# Patient Record
Sex: FEMALE | Race: BLACK OR AFRICAN AMERICAN | Employment: UNEMPLOYED | URBAN - METROPOLITAN AREA
[De-identification: names, ages, dates, MRNs, and addresses within clinical notes are randomized per-mention and may not be internally consistent; named-entity substitution may affect disease eponyms.]

---

## 2017-01-17 ENCOUNTER — OFFICE VISIT (OUTPATIENT)
Dept: INTERNAL MEDICINE CLINIC | Age: 62
End: 2017-01-17

## 2017-01-17 VITALS
SYSTOLIC BLOOD PRESSURE: 96 MMHG | DIASTOLIC BLOOD PRESSURE: 70 MMHG | WEIGHT: 182 LBS | HEART RATE: 100 BPM | RESPIRATION RATE: 16 BRPM | TEMPERATURE: 99.2 F | BODY MASS INDEX: 34.36 KG/M2 | HEIGHT: 61 IN | OXYGEN SATURATION: 94 %

## 2017-01-17 DIAGNOSIS — M12.811 ROTATOR CUFF ARTHROPATHY, RIGHT: Primary | ICD-10-CM

## 2017-01-17 DIAGNOSIS — E11.9 CONTROLLED TYPE 2 DIABETES MELLITUS WITHOUT COMPLICATION, WITHOUT LONG-TERM CURRENT USE OF INSULIN (HCC): ICD-10-CM

## 2017-01-17 LAB — GLUCOSE POC: 248 MG/DL

## 2017-01-17 RX ORDER — TRAMADOL HYDROCHLORIDE 50 MG/1
50 TABLET ORAL
Qty: 14 TAB | Refills: 0 | Status: SHIPPED | OUTPATIENT
Start: 2017-01-17 | End: 2017-02-15 | Stop reason: SDUPTHER

## 2017-01-17 RX ORDER — TRIAMCINOLONE ACETONIDE 40 MG/ML
40 INJECTION, SUSPENSION INTRA-ARTICULAR; INTRAMUSCULAR ONCE
Qty: 1 VIAL | Refills: 0
Start: 2017-01-17 | End: 2017-01-17

## 2017-01-17 RX ORDER — PREGABALIN 150 MG/1
CAPSULE ORAL
Qty: 60 CAP | Refills: 3 | Status: SHIPPED | OUTPATIENT
Start: 2017-01-17 | End: 2017-09-06 | Stop reason: SDUPTHER

## 2017-01-17 NOTE — MR AVS SNAPSHOT
Visit Information Date & Time Provider Department Dept. Phone Encounter #  
 1/17/2017 12:15 PM Madison Vazquez MD [facility] [phone] [id number] Follow-up Instructions Return in about 4 weeks (around 2/14/2017), or if symptoms worsen or fail to improve. Upcoming Health Maintenance Date Due  
 EYE EXAM RETINAL OR DILATED Q1 8/25/1965 Pneumococcal 19-64 Medium Risk (1 of 1 - PPSV23) 8/25/1974 FOOT EXAM Q1 9/16/2016 FOBT Q 1 YEAR AGE 50-75 1/13/2017 MICROALBUMIN Q1 2/10/2017 DTaP/Tdap/Td series (1 - Tdap) 11/9/2017* HEMOGLOBIN A1C Q6M 6/12/2017 LIPID PANEL Q1 12/12/2017 PAP AKA CERVICAL CYTOLOGY 1/7/2018 BREAST CANCER SCRN MAMMOGRAM 5/26/2018 *Topic was postponed. The date shown is not the original due date. Allergies as of 1/17/2017  Review Complete On: 1/17/2017 By: Madison Vazquez MD  
 No Known Allergies Current Immunizations  Reviewed on 10/10/2011 Name Date Influenza Vaccine Split 10/10/2011 Not reviewed this visit You Were Diagnosed With   
  
 Codes Comments Rotator cuff arthropathy, right    -  Primary ICD-10-CM: X36.477 ICD-9-CM: 716.81 Controlled type 2 diabetes mellitus without complication, without long-term current use of insulin ([facility]     ICD-10-CM: E11.9 ICD-9-CM: 250.00 Vitals BP Pulse Temp Resp Height(growth percentile) Weight(growth percentile) 96/70 100 99.2 °F (37.3 °C) (Oral) 16 5' 1\" (1.549 m) 182 lb (82.6 kg) SpO2 BMI OB Status Smoking Status 94% 34.39 kg/m2 Postmenopausal Never Smoker Vitals History BMI and BSA Data Body Mass Index Body Surface Area  
 34.39 kg/m 2 1.89 m 2 Preferred Pharmacy Pharmacy Name Phone [facility] [address], [address] [phone] Your Updated Medication List  
  
   
This list is accurate as of: 1/17/17  1:25 PM.  Always use your most recent med list.  
  
  
  
  
 aspirin 81 mg tablet Take  by mouth. cetirizine 10 mg tablet Commonly known as:  ZYRTEC Take 1 Tab by mouth daily. diphenoxylate-atropine 2.5-0.025 mg per tablet Commonly known as:  LOMOTIL Take 1 tablet by mouth four (4) times daily as needed for Diarrhea.  
  
 esomeprazole 40 mg capsule Commonly known as:  NexIUM  
TAKE ONE (1) CAPSULE TWICE DAILY. fenofibrate nanocrystallized 145 mg tablet Commonly known as:  [employer] Take 1 Tab by mouth daily. 134 mg daily  
  
 fluticasone 50 mcg/actuation nasal spray Commonly known as:  [place] 2 Sprays by Both Nostrils route daily. ipratropium 0.06 % nasal spray Commonly known as:  ATROVENT  
2 Sprays by Both Nostrils route four (4) times daily. irbesartan 150 mg tablet Commonly known as:  AVAPRO TAKE 1 TABLET EVERY NIGHT. LORazepam 1 mg tablet Commonly known as:  ATIVAN Take 1 Tab by mouth every four (4) hours as needed for Anxiety. Max Daily Amount: 6 mg.  
  
 metFORMIN 500 mg tablet Commonly known as:  GLUCOPHAGE Take 1 Tab by mouth two (2) times daily (with meals). mometasone 50 mcg/actuation nasal spray Commonly known as:  NASONEX  
2 Sprays by Both Nostrils route daily. pregabalin 150 mg capsule Commonly known as:  Suzannebessy Whitetead TAKE ONE (1) CAPSULE TWICE DAILY. SITagliptin 100 mg tablet Commonly known as:  [place] Take 1 Tab by mouth daily. traMADol 50 mg tablet Commonly known as:  ULTRAM  
Take 1 Tab by mouth every six (6) hours as needed for Pain. Max Daily Amount: 200 mg.  
  
 triamcinolone acetonide 40 mg/mL injection Commonly known as:  KENALOG-40  
1 mL by IntraBURSal route once for 1 dose. Prescriptions Printed Refills  
 traMADol (ULTRAM) 50 mg tablet 0 Sig: Take 1 Tab by mouth every six (6) hours as needed for Pain. Max Daily Amount: 200 mg. Class: Print  Route: Oral  
 pregabalin (LYRICA) 150 mg capsule 3 Sig: TAKE ONE (1) CAPSULE TWICE DAILY. Class: Print We Performed the Following AMB POC GLUCOSE BLOOD, BY GLUCOSE MONITORING DEVICE [25724 CPT(R)] [place] DRAIN/INJECT INTERMEDIATE JOINT/BURSA [id number] CPT(R)] Follow-up Instructions Return in about 4 weeks (around 2017), or if symptoms worsen or fail to improve. Patient Instructions [employer] Activation Thank you for requesting access to [employer]. Please follow the instructions below to securely access and download your online medical record. [employer] allows you to send messages to your doctor, view your test results, renew your prescriptions, schedule appointments, and more. How Do I Sign Up? 1. In your internet browser, go to [email or web address] 
2. Click on the First Time User? Click Here link in the Sign In box. You will be redirect to the New Member Sign Up page. 3. Enter your [employer] Access Code exactly as it appears below. You will not need to use this code after youve completed the sign-up process. If you do not sign up before the expiration date, you must request a new code. [employer] Access Code: Activation code not generated Current [employer] Status: Active (This is the date your [employer] access code will ) 4. Enter the last four digits of your Social Security Number (xxxx) and Date of Birth (mm/dd/yyyy) as indicated and click Submit. You will be taken to the next sign-up page. 5. Create a [employer]ouses ID. This will be your [employer] login ID and cannot be changed, so think of one that is secure and easy to remember. 6. Create a [employer] password. You can change your password at any time. 7. Enter your Password Reset Question and Answer. This can be used at a later time if you forget your password. 8. Enter your e-mail address. You will receive e-mail notification when new information is available in [address] 9. Click Sign Up. You can now view and download portions of your medical record. 10. Click the Download Summary menu link to download a portable copy of your medical information. Additional Information If you have questions, please visit the Frequently Asked Questions section of the Regal[employer] website at [email or web address]/Global [email or web address] Remember, [employer] is NOT to be used for urgent needs. For medical emergencies, dial 911. Introducing [facility]! Dear Jose Taylor: Thank you for requesting a RegalBox account. Our records indicate that you already have an active RegalBox account. You can access your account anytime at [email or web address]/Global Grind Did you know that you can access your hospital and ER discharge instructions at any time in RegalBox? You can also review all of your test results from your hospital stay or ER visit. Additional Information If you have questions, please visit the Frequently Asked Questions section of the [employer] website at [email or web address] Remember, [employer] is NOT to be used for urgent needs. For medical emergencies, dial 911. Now available from your iPhone and Android! Please provide this summary of care documentation to your next provider. Your primary care clinician is listed as Catarina He. If you have any questions after today's visit, please call [phone].

## 2017-01-17 NOTE — PATIENT INSTRUCTIONS
iTrac[employer]ar[employer]ullo Activation    Thank you for requesting access to [employer]. Please follow the instructions below to securely access and download your online medical record. [employer] allows you to send messages to your doctor, view your test results, renew your prescriptions, schedule appointments, and more. How Do I Sign Up? 1. In your internet browser, go to [email or web address]  2. Click on the First Time User? Click Here link in the Sign In box. You will be redirect to the New Member Sign Up page. 3. Enter your Ref[employer] Access Code exactly as it appears below. You will not need to use this code after youve completed the sign-up process. If you do not sign up before the expiration date, you must request a new code. [employer] Access Code: Activation code not generated  Current [employer] Status: Active (This is the date your Ref[employer] access code will )    4. Enter the last four digits of your Social Security Number (xxxx) and Date of Birth (mm/dd/yyyy) as indicated and click Submit. You will be taken to the next sign-up page. 5. Create a Ref[employer]ics ID. This will be your [employer]ics login ID and cannot be changed, so think of one that is secure and easy to remember. 6. Create a [employer] password. You can change your password at any time. 7. Enter your Password Reset Question and Answer. This can be used at a later time if you forget your password. 8. Enter your e-mail address. You will receive e-mail notification when new information is available in [address] 9. Click Sign Up. You can now view and download portions of your medical record. 10. Click the Download Summary menu link to download a portable copy of your medical information. Additional Information    If you have questions, please visit the Frequently Asked Questions section of the [employer] website at [email or web address] Remember, [employer] is NOT to be used for urgent needs. For medical emergencies, dial 911.

## 2017-01-17 NOTE — PROGRESS NOTES
Maegan Shane is a 64 y.o. female and presents with Back Pain; Diabetes; Other (disability); and Shoulder Pain  . Subjective:    Dyslipidemia Review:  Patient presents for evaluation of lipids. Compliance with treatment thus far has been excellent. A repeat fasting lipid profile was done. The patient does not use medications that may worsen dyslipidemias (corticosteroids, progestins, anabolic steroids, diuretics, beta-blockers, amiodarone, cyclosporine, olanzapine). The patient exercises daily. The patient is not known to have coexisting coronary artery disease. Diabetes Mellitus Review:  She has diabetes mellitus. Diabetic ROS - medication compliance: compliant all of the time, diabetic diet compliance: compliant all of the time, home glucose monitoring: is performed. Known diabetic complications: none  Cardiovascular risk factors: family history, dyslipidemia, diabetes mellitus, obesity, hypertension  Current diabetic medications include oral agents   Eye exam current (within one year): no  Weight trend: stable  Prior visit with dietician: no  Current diet: \"healthy\" diet  in general  Current exercise: walking  Current monitoring regimen: home blood tests - daily  Home blood sugar records: trend: stable  Any episodes of hypoglycemia? no  Is She on ACE inhibitor or angiotensin II receptor blocker? Yes     Back Pain Review:  Patient presents for evaluation of low back problems. Symptoms have been present for months and include pain in lower back (dull, mild in character;4 /10 in severity). Initial inciting event: none. Symptoms are worst: at times. Alleviating factors identifiable by patient are lying flat, medication . Exacerbating factors identifiable by patient are bending forwards, bending backwards. Treatments so far initiated by patient: medication Previous lower back problems: reported. Previous workup: none.      Shoulder Pain Review:  Patient complains of right side shoulder pains have retured. The symptoms began months ago Course of symptoms since onset has been gradually worsening. Pain is described as overall severity = moderate. Symptoms were incited by no known event. Patient denies N/A. Therapy to date includes OTC analgesics:steroid injections effective. Review of Systems  Constitutional: negative for fevers, chills, anorexia and weight loss  Eyes:   negative for visual disturbance and irritation  ENT:   negative for tinnitus,sore throat,nasal congestion,ear pains. hoarseness  Respiratory:  negative for cough, hemoptysis, dyspnea,wheezing  CV:   negative for chest pain, palpitations, lower extremity edema  GI:   nausea,  abdominal fullness  Endo:               negative for polyuria,polydipsia,polyphagia,heat intolerance  Genitourinary: negative for frequency, dysuria and hematuria  Integument:  negative for rash and pruritus  Hematologic:  negative for easy bruising and gum/nose bleeding  Musculoskel: negative for myalgias, arthralgias, back pain, muscle weakness, joint pain  Neurological:  negative for headaches, dizziness, vertigo, memory problems and gait   Behavl/Psych: negative for feelings of anxiety, depression, mood changes    Past Medical History   Diagnosis Date    Acute cholecystitis 2/9/2011    Acute cystitis 2/9/2011    Cholecystitis 2/9/2011    Cholelithiasis 2/9/2011    Chronic Pain 2/9/2011    Essential hypertension, benign 2/9/2011    GERD (gastroesophageal reflux disease)     GERD, Gastro- Esophageal Reflux Diease (acid reflux) 2/9/2011    Herniated nucleus pulposus 2/9/2011    Hypercholesteremia 2/9/2011    Hypercholesteremia 2/9/2011    Hypertension     Neurogenic bladder, NOS 2/9/2011     Past Surgical History   Procedure Laterality Date    Hx gyn      Hx hammer toe repair  6/21/2016     left foot      Social History     Social History    Marital status:      Spouse name: N/A    Number of children: N/A    Years of education: N/A     Social History Main Topics    Smoking status: Never Smoker    Smokeless tobacco: Never Used    Alcohol use No    Drug use: No    Sexual activity: Yes     Partners: Male     Other Topics Concern    None     Social History Narrative     Family History   Problem Relation Age of Onset    Heart Disease Mother     Cancer Father      Current Outpatient Prescriptions   Medication Sig Dispense Refill    triamcinolone acetonide (KENALOG-40) 40 mg/mL injection 1 mL by IntraBURSal route once for 1 dose. 1 Vial 0    traMADol (ULTRAM) 50 mg tablet Take 1 Tab by mouth every six (6) hours as needed for Pain. Max Daily Amount: 200 mg. 14 Tab 0    pregabalin (LYRICA) 150 mg capsule TAKE ONE (1) CAPSULE TWICE DAILY. 60 Cap 3    sitaGLIPtin (JANUVIA) 100 mg tablet Take 1 Tab by mouth daily. 30 Tab 12    fluticasone (FLONASE) 50 mcg/actuation nasal spray 2 Sprays by Both Nostrils route daily. 1 Bottle 12    ipratropium (ATROVENT) 0.06 % nasal spray 2 Sprays by Both Nostrils route four (4) times daily. 15 mL 0    cetirizine (ZYRTEC) 10 mg tablet Take 1 Tab by mouth daily. 30 Tab 0    esomeprazole (NEXIUM) 40 mg capsule TAKE ONE (1) CAPSULE TWICE DAILY. 60 Cap 12    metFORMIN (GLUCOPHAGE) 500 mg tablet Take 1 Tab by mouth two (2) times daily (with meals). 60 Tab 12    fenofibrate nanocrystallized (TRICOR) 145 mg tablet Take 1 Tab by mouth daily. 134 mg daily 30 Tab 12    irbesartan (AVAPRO) 150 mg tablet TAKE 1 TABLET EVERY NIGHT. 30 Tab 12    LORazepam (ATIVAN) 1 mg tablet Take 1 Tab by mouth every four (4) hours as needed for Anxiety. Max Daily Amount: 6 mg. 4 Tab 0    diphenoxylate-atropine (LOMOTIL) 2.5-0.025 mg per tablet Take 1 tablet by mouth four (4) times daily as needed for Diarrhea. 60 tablet 0    mometasone (NASONEX) 50 mcg/actuation nasal spray 2 Sprays by Both Nostrils route daily. 1 Container 12    aspirin 81 mg tablet Take  by mouth.        No Known Allergies    Objective:  Visit Vitals    BP 96/70    Pulse 100    Temp 99.2 °F (37.3 °C) (Oral)    Resp 16    Ht 5' 1\" (1.549 m)    Wt 182 lb (82.6 kg)    SpO2 94%    BMI 34.39 kg/m2     Physical Exam:   General appearance - alert, well appearing, and in moderate distress  Mental status - alert, oriented to person, place, and time  EYE-LAURI, EOMI, corneas normal, no foreign bodies  ENT-ENT exam normal, no neck nodes or sinus tenderness  Nose - normal and patent, no erythema, discharge or polyps  Mouth - mucous membranes moist, pharynx normal without lesions  Neck - supple, no significant adenopathy   Chest - clear to auscultation, no wheezes, rales or rhonchi, symmetric air entry   Heart - normal rate, regular rhythm, normal S1, S2, no murmurs, rubs, clicks or gallops   Abdomen - soft, nontender, nondistended, no masses or organomegaly  Lymph- no adenopathy palpable  Ext-peripheral pulses normal, no pedal edema, no clubbing or cyanosis  Skin-Warm and dry. no hyperpigmentation, vitiligo, or suspicious lesions  Neuro -alert, oriented, normal speech, no focal findings or movement disorder noted  Neck-normal C-spine, no tenderness, full ROM without pain  Rt.shoulder-reduced range of motion of rt., subdeltoid tenderness    Results for orders placed or performed in visit on 17   AMB POC GLUCOSE BLOOD, BY GLUCOSE MONITORING DEVICE   Result Value Ref Range    Glucose  mg/dL       Assessment/Plan:    ICD-10-CM ICD-9-CM    1. Rotator cuff arthropathy, right M12.811 716.81 ID DRAIN/INJECT INTERMEDIATE JOINT/BURSA   2. Controlled type 2 diabetes mellitus without complication, without long-term current use of insulin ([facility]) E11.9 250.00 AMB POC GLUCOSE BLOOD, BY GLUCOSE MONITORING DEVICE     Orders Placed This Encounter    AMB POC GLUCOSE BLOOD, BY GLUCOSE MONITORING DEVICE    ID DRAIN/INJECT INTERMEDIATE JOINT/BURSA    triamcinolone acetonide (KENALOG-40) 40 mg/mL injection     Si mL by IntraBURSal route once for 1 dose.      Dispense:  1 Vial     Refill: 0    traMADol (ULTRAM) 50 mg tablet     Sig: Take 1 Tab by mouth every six (6) hours as needed for Pain. Max Daily Amount: 200 mg. Dispense:  14 Tab     Refill:  0    pregabalin (LYRICA) 150 mg capsule     Sig: TAKE ONE (1) CAPSULE TWICE DAILY. Dispense:  60 Cap     Refill:  3     lose weight, follow low fat diet, follow low salt diet,Take 81mg aspirin daily  Indications:   Symptomatic relief of pain    Procedure:  After consent was obtained, using sterile technique the right shoulder joint was prepped using alcohol. Local anesthetic used: 1% lidocaine. . The joint was entered and Kenalog 40 mg was mixed with 1% lidocaine 3 ml  and injected into the joint and the needle withdrawn. The procedure was well tolerated. The patient is asked to continue to rest the joint for a few more days before resuming regular activities. It may be more painful for the first 1-2 days. Watch for fever, or increased swelling or persistent pain in the joint. Call or return to clinic prn if such symptoms occur or there is failure to improve as anticipated.       [facility]  OFFICE PROCEDURE PROGRESS NOTE        Chart reviewed for the following:   Alex Layton MD, have reviewed the History, Physical and updated the Allergic reactions for [address] [place] [address] performed immediately prior to start of procedure:   Alex Layton MD, have performed the following reviews on [employer] [place] prior to the start of the procedure:            * Patient was identified by name and date of birth   * Agreement on procedure being performed was verified  * Risks and Benefits explained to the patient  * Procedure site verified and marked as necessary  * Patient was positioned for comfort       Time:1:20pm      Date of procedure: 1/17/2017    Procedure performed by:  Monisha Sibley MD    Patient assisted by: self    How tolerated by patient: tolerated the procedure well with no complications    Comments: nonev                Patient Instructions   MyChart Activation    Thank you for requesting access to Etable. Please follow the instructions below to securely access and download your online medical record. Etable allows you to send messages to your doctor, view your test results, renew your prescriptions, schedule appointments, and more. How Do I Sign Up? 1. In your internet browser, go to [email or web address]  2. Click on the First Time User? Click Here link in the Sign In box. You will be redirect to the New Member Sign Up page. 3. Enter your Etable Access Code exactly as it appears below. You will not need to use this code after youve completed the sign-up process. If you do not sign up before the expiration date, you must request a new code. Etable Access Code: Activation code not generated  Current Etable Status: Active (This is the date your Etable access code will )    4. Enter the last four digits of your Social Security Number (xxxx) and Date of Birth (mm/dd/yyyy) as indicated and click Submit. You will be taken to the next sign-up page. 5. Create a Etable ID. This will be your Etable login ID and cannot be changed, so think of one that is secure and easy to remember. 6. Create a Etable password. You can change your password at any time. 7. Enter your Password Reset Question and Answer. This can be used at a later time if you forget your password. 8. Enter your e-mail address. You will receive e-mail notification when new information is available in [address] 9. Click Sign Up. You can now view and download portions of your medical record. 10. Click the Download Summary menu link to download a portable copy of your medical information. Additional Information    If you have questions, please visit the Frequently Asked Questions section of the Etable website at [email or web address] Remember, Etable is NOT to be used for urgent needs. For medical emergencies, dial 911. Follow-up Disposition:  Return in about 4 weeks (around 2/14/2017), or if symptoms worsen or fail to improve. I have reviewed with the patient details of the assessment and plan and all questions were answered. Relevent patient education was performed    An After Visit Summary was printed and given to the patient.

## 2017-02-15 ENCOUNTER — OFFICE VISIT (OUTPATIENT)
Dept: INTERNAL MEDICINE CLINIC | Age: 62
End: 2017-02-15

## 2017-02-15 VITALS
RESPIRATION RATE: 18 BRPM | TEMPERATURE: 98 F | OXYGEN SATURATION: 98 % | SYSTOLIC BLOOD PRESSURE: 100 MMHG | HEART RATE: 78 BPM | WEIGHT: 180 LBS | DIASTOLIC BLOOD PRESSURE: 60 MMHG | HEIGHT: 61 IN | BODY MASS INDEX: 33.99 KG/M2

## 2017-02-15 DIAGNOSIS — E78.00 HYPERCHOLESTEREMIA: ICD-10-CM

## 2017-02-15 DIAGNOSIS — M12.811 ROTATOR CUFF ARTHROPATHY, RIGHT: ICD-10-CM

## 2017-02-15 DIAGNOSIS — I10 ESSENTIAL HYPERTENSION, BENIGN: ICD-10-CM

## 2017-02-15 DIAGNOSIS — M51.26 HERNIATION OF INTERVERTEBRAL DISC BETWEEN L4 AND L5: ICD-10-CM

## 2017-02-15 DIAGNOSIS — M48.061 LUMBAR SPINAL STENOSIS: ICD-10-CM

## 2017-02-15 DIAGNOSIS — E11.9 CONTROLLED TYPE 2 DIABETES MELLITUS WITHOUT COMPLICATION, WITHOUT LONG-TERM CURRENT USE OF INSULIN (HCC): Primary | ICD-10-CM

## 2017-02-15 LAB — GLUCOSE POC: 144 MG/DL

## 2017-02-15 RX ORDER — TRAMADOL HYDROCHLORIDE 50 MG/1
50 TABLET ORAL
Qty: 14 TAB | Refills: 0 | Status: SHIPPED | OUTPATIENT
Start: 2017-02-15 | End: 2017-03-15 | Stop reason: ALTCHOICE

## 2017-02-15 NOTE — PROGRESS NOTES
Tremaine Moss is a 64 y.o. female and presents with Dizziness and Diabetes  . Subjective:  She still has some vertigo noted  She has dizziness on standing    Diabetes Mellitus Review:  She has diabetes mellitus. Diabetic ROS - medication compliance: compliant all of the time, diabetic diet compliance: compliant all of the time, home glucose monitoring: is performed. Known diabetic complications: none  Cardiovascular risk factors: family history, dyslipidemia, diabetes mellitus, obesity, hypertension  Current diabetic medications include oral agents   Eye exam current (within one year): no  Weight trend: stable  Prior visit with dietician: no  Current diet: \"healthy\" diet  in general  Current exercise: walking  Current monitoring regimen: home blood tests - daily  Home blood sugar records: trend: stable  Any episodes of hypoglycemia? no  Is She on ACE inhibitor or angiotensin II receptor blocker? Yes     Back Pain Review:  Patient presents for evaluation of low back problems. Symptoms have been present for years and include pain in lower back (dull, mild in character; 4/10 in severity). Initial inciting event: unknowne. Symptoms are worst: at times. Alleviating factors identifiable by patient are lying flat, medication . Exacerbating factors identifiable by patient are bending forwards, bending backwards. Treatments so far initiated by patient: medication Previous lower back problems: reported. Previous workup: noted. Review of Systems  Constitutional: negative for fevers, chills, anorexia and weight loss  Eyes:   negative for visual disturbance and irritation  ENT:   negative for tinnitus,sore throat,nasal congestion,ear pains. hoarseness  Respiratory:  negative for cough, hemoptysis, dyspnea,wheezing  CV:   negative for chest pain, palpitations, lower extremity edema  GI:   negative for nausea, vomiting, diarrhea, abdominal pain,melena  Endo:               negative for polyuria,polydipsia,polyphagia,heat intolerance  Genitourinary: negative for frequency, dysuria and hematuria  Integument:  negative for rash and pruritus  Hematologic:  negative for easy bruising and gum/nose bleeding  Musculoskel: back pain, muscle weakness, joint pain  Neurological:  negative for headaches, dizziness, vertigo, memory problems and gait   Behavl/Psych: negative for feelings of anxiety, depression, mood changes    Past Medical History   Diagnosis Date    Acute cholecystitis 2/9/2011    Acute cystitis 2/9/2011    Cholecystitis 2/9/2011    Cholelithiasis 2/9/2011    Chronic Pain 2/9/2011    Essential hypertension, benign 2/9/2011    GERD (gastroesophageal reflux disease)     GERD, Gastro- Esophageal Reflux Diease (acid reflux) 2/9/2011    Herniated nucleus pulposus 2/9/2011    Hypercholesteremia 2/9/2011    Hypercholesteremia 2/9/2011    Hypertension     Neurogenic bladder, NOS 2/9/2011     Past Surgical History   Procedure Laterality Date    Hx gyn      Hx hammer toe repair  6/21/2016     left foot      Social History     Social History    Marital status:      Spouse name: N/A    Number of children: N/A    Years of education: N/A     Social History Main Topics    Smoking status: Never Smoker    Smokeless tobacco: Never Used    Alcohol use No    Drug use: No    Sexual activity: Yes     Partners: Male     Other Topics Concern    None     Social History Narrative     Family History   Problem Relation Age of Onset    Heart Disease Mother     Cancer Father      Current Outpatient Prescriptions   Medication Sig Dispense Refill    traMADol (ULTRAM) 50 mg tablet Take 1 Tab by mouth every six (6) hours as needed for Pain. Max Daily Amount: 200 mg. 14 Tab 0    pregabalin (LYRICA) 150 mg capsule TAKE ONE (1) CAPSULE TWICE DAILY. 60 Cap 3    sitaGLIPtin (JANUVIA) 100 mg tablet Take 1 Tab by mouth daily.  30 Tab 12    fluticasone (FLONASE) 50 mcg/actuation nasal spray 2 Sprays by Both Nostrils route daily. 1 Bottle 12    ipratropium (ATROVENT) 0.06 % nasal spray 2 Sprays by Both Nostrils route four (4) times daily. 15 mL 0    cetirizine (ZYRTEC) 10 mg tablet Take 1 Tab by mouth daily. 30 Tab 0    metFORMIN (GLUCOPHAGE) 500 mg tablet Take 1 Tab by mouth two (2) times daily (with meals). 60 Tab 12    fenofibrate nanocrystallized (TRICOR) 145 mg tablet Take 1 Tab by mouth daily. 134 mg daily 30 Tab 12    irbesartan (AVAPRO) 150 mg tablet TAKE 1 TABLET EVERY NIGHT. 30 Tab 12    aspirin 81 mg tablet Take  by mouth.  esomeprazole (NEXIUM) 40 mg capsule TAKE ONE (1) CAPSULE TWICE DAILY. 60 Cap 12    LORazepam (ATIVAN) 1 mg tablet Take 1 Tab by mouth every four (4) hours as needed for Anxiety. Max Daily Amount: 6 mg. 4 Tab 0    diphenoxylate-atropine (LOMOTIL) 2.5-0.025 mg per tablet Take 1 tablet by mouth four (4) times daily as needed for Diarrhea. 60 tablet 0    mometasone (NASONEX) 50 mcg/actuation nasal spray 2 Sprays by Both Nostrils route daily.  1 Container 12     No Known Allergies    Objective:  Visit Vitals    /60    Pulse 78    Temp 98 °F (36.7 °C) (Oral)    Resp 18    Ht 5' 1\" (1.549 m)    Wt 180 lb (81.6 kg)    SpO2 98%    BMI 34.01 kg/m2     Physical Exam:   General appearance - alert, well appearing, and in moderate distress  Mental status - alert, oriented to person, place, and time  EYE-LAURI, EOMI, corneas normal, no foreign bodies  ENT-ENT exam normal, no neck nodes or sinus tenderness  Nose - normal and patent, no erythema, discharge or polyps  Mouth - mucous membranes moist, pharynx normal without lesions  Neck - supple, no significant adenopathy   Chest - clear to auscultation, no wheezes, rales or rhonchi, symmetric air entry   Heart - normal rate, regular rhythm, normal S1, S2, no murmurs, rubs, clicks or gallops   Abdomen - soft, nontender, nondistended, no masses or organomegaly  Lymph- no adenopathy palpable  Ext-peripheral pulses normal, no pedal edema, no clubbing or cyanosis  Skin-Warm and dry. no hyperpigmentation, vitiligo, or suspicious lesions  Neuro -alert, oriented, normal speech, no focal findings or movement disorder noted  Neck-normal C-spine, no tenderness, full ROM without pain  Feet-no nail deformities or callus formation with good pulses noted  Back-.limited range of motion, pain with motion noted during exam, tenderness noted ls spine    Results for orders placed or performed in visit on 02/15/17   AMB POC GLUCOSE BLOOD, BY GLUCOSE MONITORING DEVICE   Result Value Ref Range    Glucose  mg/dL       Assessment/Plan:    ICD-10-CM ICD-9-CM    1. Controlled type 2 diabetes mellitus without complication, without long-term current use of insulin ([facility]) E11.9 250.00 AMB POC GLUCOSE BLOOD, BY GLUCOSE MONITORING DEVICE     Orders Placed This Encounter    AMB POC GLUCOSE BLOOD, BY GLUCOSE MONITORING DEVICE     lose weight, increase physical activity, follow low fat diet, follow low salt diet,Take 81mg aspirin daily  There are no Patient Instructions on file for this visit. Follow-up Disposition: Not on File      I have reviewed with the patient details of the assessment and plan and all questions were answered. Relevent patient education was performed    An After Visit Summary was printed and given to the patient.

## 2017-02-15 NOTE — PATIENT INSTRUCTIONS
[employer] Activation    Thank you for requesting access to [employer]. Please follow the instructions below to securely access and download your online medical record. [facility] allows you to send messages to your doctor, view your test results, renew your prescriptions, schedule appointments, and more. How Do I Sign Up? 1. In your internet browser, go to [email or web address]  2. Click on the First Time User? Click Here link in the Sign In box. You will be redirect to the New Member Sign Up page. 3. Enter your [facility] Access Code exactly as it appears below. You will not need to use this code after youve completed the sign-up process. If you do not sign up before the expiration date, you must request a new code. [facility] Access Code: Activation code not generated  Current [facility] Status: Active (This is the date your [facility]F[employer] access code will )    4. Enter the last four digits of your Social Security Number (xxxx) and Date of Birth (mm/dd/yyyy) as indicated and click Submit. You will be taken to the next sign-up page. 5. Create a ProF[employer] ID. This will be your [employer] login ID and cannot be changed, so think of one that is secure and easy to remember. 6. Create a [employer] password. You can change your password at any time. 7. Enter your Password Reset Question and Answer. This can be used at a later time if you forget your password. 8. Enter your e-mail address. You will receive e-mail notification when new information is available in [address] 9. Click Sign Up. You can now view and download portions of your medical record. 10. Click the Download Summary menu link to download a portable copy of your medical information. Additional Information    If you have questions, please visit the Frequently Asked Questions section of the [employer] website at [email or web address] Remember, [employer] is NOT to be used for urgent needs. For medical emergencies, dial 911.

## 2017-02-15 NOTE — MR AVS SNAPSHOT
Visit Information Date & Time Provider Department Dept. Phone Encounter #  
 2/15/2017  9:30 AM José Miguel Stinson MD [address] [place] [address] [phone] [id number] Follow-up Instructions Return in about 4 weeks (around 3/15/2017), or if symptoms worsen or fail to improve. Follow-up and Disposition History Upcoming Health Maintenance Date Due  
 EYE EXAM RETINAL OR DILATED Q1 8/25/1965 Pneumococcal 19-64 Medium Risk (1 of 1 - PPSV23) 8/25/1974 FOOT EXAM Q1 9/16/2016 FOBT Q 1 YEAR AGE 50-75 1/13/2017 MICROALBUMIN Q1 2/10/2017 DTaP/Tdap/Td series (1 - Tdap) 11/9/2017* HEMOGLOBIN A1C Q6M 6/12/2017 LIPID PANEL Q1 12/12/2017 PAP AKA CERVICAL CYTOLOGY 1/7/2018 BREAST CANCER SCRN MAMMOGRAM 5/26/2018 *Topic was postponed. The date shown is not the original due date. Allergies as of 2/15/2017  Review Complete On: 2/15/2017 By: José Miguel Stinson MD  
 No Known Allergies Current Immunizations  Reviewed on 10/10/2011 Name Date Influenza Vaccine Split 10/10/2011 Not reviewed this visit You Were Diagnosed With   
  
 Codes Comments Controlled type 2 diabetes mellitus without complication, without long-term current use of insulin [facility]    -  Primary ICD-10-CM: E11.9 ICD-9-CM: 250.00 Rotator cuff arthropathy, right     ICD-10-CM: B92.157 ICD-9-CM: 716.81 Hypercholesteremia     ICD-10-CM: E78.00 ICD-9-CM: 272.0 Essential hypertension, benign     ICD-10-CM: I10 
ICD-9-CM: 401.1 Lumbar spinal stenosis     ICD-10-CM: M48.06 
ICD-9-CM: 724.02 Herniation of intervertebral disc between L4 and L5     ICD-10-CM: M51.26 
ICD-9-CM: 722.10 Vitals BP Pulse Temp Resp Height(growth percentile) Weight(growth percentile) 100/60 78 98 °F (36.7 °C) (Oral) 18 5' 1\" (1.549 m) 180 lb (81.6 kg) SpO2 BMI OB Status Smoking Status 98% 34.01 kg/m2 Postmenopausal Never Smoker Vitals History BMI and BSA Data Body Mass Index Body Surface Area 34.01 kg/m 2 1.87 m 2 [facility] [address], [address] [place] [address] Road [phone] Your Updated Medication List  
  
   
This list is accurate as of: 2/15/17 10:19 AM.  Always use your most recent med list.  
  
  
  
  
 aspirin 81 mg tablet Take  by mouth. cetirizine 10 mg tablet Commonly known as:  ZYRTEC Take 1 Tab by mouth daily. diphenoxylate-atropine 2.5-0.025 mg per tablet Commonly known as:  LOMOTIL Take 1 tablet by mouth four (4) times daily as needed for Diarrhea.  
  
 esomeprazole 40 mg capsule Commonly known as:  NexIUM  
TAKE ONE (1) CAPSULE TWICE DAILY. fenofibrate nanocrystallized 145 mg tablet Commonly known as:  [employer] Take 1 Tab by mouth daily. 134 mg daily  
  
 fluticasone 50 mcg/actuation nasal spray Commonly known as:  Ivana Arellano 2 Sprays by Both Nostrils route daily. ipratropium 0.06 % nasal spray Commonly known as:  ATROVENT  
2 Sprays by Both Nostrils route four (4) times daily. irbesartan 150 mg tablet Commonly known as:  AVAPRO TAKE 1 TABLET EVERY NIGHT. LORazepam 1 mg tablet Commonly known as:  ATIVAN Take 1 Tab by mouth every four (4) hours as needed for Anxiety. Max Daily Amount: 6 mg.  
  
 metFORMIN 500 mg tablet Commonly known as:  GLUCOPHAGE Take 1 Tab by mouth two (2) times daily (with meals). mometasone 50 mcg/actuation nasal spray Commonly known as:  NASONEX  
2 Sprays by Both Nostrils route daily. pregabalin 150 mg capsule Commonly known as:  Monse Erika TAKE ONE (1) CAPSULE TWICE DAILY. SITagliptin 100 mg tablet Commonly known as:  Paul Bernie Take 1 Tab by mouth daily. traMADol 50 mg tablet Commonly known as:  ULTRAM  
Take 1 Tab by mouth every six (6) hours as needed for Pain. Max Daily Amount: 200 mg. Prescriptions Printed Refills  
 traMADol (ULTRAM) 50 mg tablet 0 Sig: Take 1 Tab by mouth every six (6) hours as needed for Pain. Max Daily Amount: 200 mg. Class: Print Route: Oral  
  
We Performed the Following AMB POC GLUCOSE BLOOD, BY GLUCOSE MONITORING DEVICE [13545 CPT(R)] Follow-up Instructions Return in about 4 weeks (around 3/15/2017), or if symptoms worsen or fail to improve. Patient Instructions CH[employer]r Activation Thank you for requesting access to [employer]. Please follow the instructions below to securely access and download your online medical record. [employer] allows you to send messages to your doctor, view your test results, renew your prescriptions, schedule appointments, and more. How Do I Sign Up? 1. In your internet browser, go to [email or web address] 
2. Click on the First Time User? Click Here link in the Sign In box. You will be redirect to the New Member Sign Up page. 3. Enter your [employer] Access Code exactly as it appears below. You will not need to use this code after youve completed the sign-up process. If you do not sign up before the expiration date, you must request a new code. [employer] Access Code: Activation code not generated Current [employer] Status: Active (This is the date your [employer] access code will ) 4. Enter the last four digits of your Social Security Number (xxxx) and Date of Birth (mm/dd/yyyy) as indicated and click Submit. You will be taken to the next sign-up page. 5. Create a [employer] ID. This will be your [employer] login ID and cannot be changed, so think of one that is secure and easy to remember. 6. Create a [employer] password. You can change your password at any time. 7. Enter your Password Reset Question and Answer. This can be used at a later time if you forget your password. 8. Enter your e-mail address. You will receive e-mail notification when new information is available in [address] 9. Click Sign Up. You can now view and download portions of your medical record. 10. Click the Download Summary menu link to download a portable copy of your medical information. Additional Information If you have questions, please visit the Frequently Asked Questions section of the [employer] website at [email or web address]/. Remember, [employer] is NOT to be used for urgent needs. For medical emergencies, dial 911. Introducing [facility]! Dear Steve Lang: Thank you for requesting a [facility] account. Our records indicate that you already have an active [facility] account. You can access your account anytime at [email or web address] Did you know that you can access your hospital and ER discharge instructions at any time in [facility]? You can also review all of your test results from your hospital stay or ER visit. Additional Information If you have questions, please visit the Frequently Asked Questions section of the [employer] website at [email or web address] Remember, [employer] is NOT to be used for urgent needs. For medical emergencies, dial 911. Now available from your iPhone and Android! Please provide this summary of care documentation to your next provider. Your primary care clinician is listed as Curry Parker. If you have any questions after today's visit, please call [phone].

## 2017-02-21 RX ORDER — FENOFIBRATE 134 MG/1
CAPSULE ORAL
Qty: 30 CAP | Refills: 6 | Status: SHIPPED | OUTPATIENT
Start: 2017-02-21 | End: 2018-01-04 | Stop reason: SDUPTHER

## 2017-02-21 RX ORDER — IRBESARTAN 150 MG/1
TABLET ORAL
Qty: 30 TAB | Refills: 12 | Status: SHIPPED | OUTPATIENT
Start: 2017-02-21 | End: 2022-03-01

## 2017-03-15 ENCOUNTER — OFFICE VISIT (OUTPATIENT)
Dept: INTERNAL MEDICINE CLINIC | Age: 62
End: 2017-03-15

## 2017-03-15 VITALS
HEART RATE: 72 BPM | TEMPERATURE: 97.9 F | WEIGHT: 178 LBS | RESPIRATION RATE: 16 BRPM | HEIGHT: 61 IN | OXYGEN SATURATION: 98 % | DIASTOLIC BLOOD PRESSURE: 84 MMHG | BODY MASS INDEX: 33.61 KG/M2 | SYSTOLIC BLOOD PRESSURE: 110 MMHG

## 2017-03-15 DIAGNOSIS — I10 ESSENTIAL HYPERTENSION, BENIGN: ICD-10-CM

## 2017-03-15 DIAGNOSIS — K21.9 GASTROESOPHAGEAL REFLUX DISEASE WITHOUT ESOPHAGITIS: ICD-10-CM

## 2017-03-15 DIAGNOSIS — M47.817 FACET ARTHRITIS OF LUMBOSACRAL REGION: ICD-10-CM

## 2017-03-15 DIAGNOSIS — M51.26 HERNIATION OF INTERVERTEBRAL DISC BETWEEN L4 AND L5: ICD-10-CM

## 2017-03-15 DIAGNOSIS — E78.00 HYPERCHOLESTEREMIA: ICD-10-CM

## 2017-03-15 DIAGNOSIS — Z12.11 SCREEN FOR COLON CANCER: ICD-10-CM

## 2017-03-15 DIAGNOSIS — M48.061 LUMBAR SPINAL STENOSIS: ICD-10-CM

## 2017-03-15 DIAGNOSIS — E11.9 CONTROLLED TYPE 2 DIABETES MELLITUS WITHOUT COMPLICATION, WITHOUT LONG-TERM CURRENT USE OF INSULIN (HCC): Primary | ICD-10-CM

## 2017-03-15 LAB — GLUCOSE POC: 147 MG/DL

## 2017-03-15 RX ORDER — ACETAMINOPHEN AND CODEINE PHOSPHATE 300; 30 MG/1; MG/1
1 TABLET ORAL
Qty: 14 TAB | Refills: 0 | Status: SHIPPED | OUTPATIENT
Start: 2017-03-15 | End: 2017-07-10 | Stop reason: ALTCHOICE

## 2017-03-15 NOTE — PATIENT INSTRUCTIONS
Smart Balloon[employer]Hachimenroppi Activation    Thank you for requesting access to [employer]healthtracker. Please follow the instructions below to securely access and download your online medical record. [employer]he[employer]htracker allows you to send messages to your doctor, view your test results, renew your prescriptions, schedule appointments, and more. How Do I Sign Up? 1. In your internet browser, go to [email or web address]  2. Click on the First Time User? Click Here link in the Sign In box. You will be redirect to the New Member Sign Up page. 3. Enter your ehealthtracker Access Code exactly as it appears below. You will not need to use this code after youve completed the sign-up process. If you do not sign up before the expiration date, you must request a new code. ehe[employer]htracker Access Code: Activation code not generated  Current ehealthtracker Status: Active (This is the date your ehealthtracker access code will )    4. Enter the last four digits of your Social Security Number (xxxx) and Date of Birth (mm/dd/yyyy) as indicated and click Submit. You will be taken to the next sign-up page. 5. Create a ehealthtracker ID. This will be your ehealthtracker login ID and cannot be changed, so think of one that is secure and easy to remember. 6. Create a ehealthtracker password. You can change your password at any time. 7. Enter your Password Reset Question and Answer. This can be used at a later time if you forget your password. 8. Enter your e-mail address. You will receive e-mail notification when new information is available in [address] 9. Click Sign Up. You can now view and download portions of your medical record. 10. Click the Download Summary menu link to download a portable copy of your medical information. Additional Information    If you have questions, please visit the Frequently Asked Questions section of the ehealthtracker website at [email or web address] Remember, ehealthtracker is NOT to be used for urgent needs. For medical emergencies, dial 911.

## 2017-03-15 NOTE — MR AVS SNAPSHOT
Visit Information Date & Time Provider Department Dept. Phone Encounter #  
 3/15/2017  3:30 PM Elan Alvarez MD [address] [place] [phone] [id number] Follow-up Instructions Return in about 3 months (around 6/15/2017), or if symptoms worsen or fail to improve. Your Appointments 3/15/2017  3:30 PM  
ROUTINE CARE with Elan Alvarez MD  
[facility] - Carin Rodriguez ([facility]) Appt Note: 1mo fu  
 [address] [place] [address],[address] [place] [address] [phone]  
[phone]  
  
   
 [address] [place] [address] 7 [phone] Upcoming Health Maintenance Date Due  
 EYE EXAM RETINAL OR DILATED Q1 8/25/1965 Pneumococcal 19-64 Medium Risk (1 of 1 - PPSV23) 8/25/1974 FOOT EXAM Q1 9/16/2016 FOBT Q 1 YEAR AGE 50-75 1/13/2017 MICROALBUMIN Q1 2/10/2017 DTaP/Tdap/Td series (1 - Tdap) 11/9/2017* HEMOGLOBIN A1C Q6M 6/12/2017 LIPID PANEL Q1 12/12/2017 PAP AKA CERVICAL CYTOLOGY 1/7/2018 BREAST CANCER SCRN MAMMOGRAM 5/26/2018 *Topic was postponed. The date shown is not the original due date. Allergies as of 3/15/2017  Review Complete On: 3/15/2017 By: Elan Alvarez MD  
 No Known Allergies Current Immunizations  Reviewed on 10/10/2011 Name Date Influenza Vaccine Split 10/10/2011 Not reviewed this visit You Were Diagnosed With   
  
 Codes Comments Controlled type 2 diabetes mellitus without complication, without long-term current use of insulin ([facility] [address]    -  Primary ICD-10-CM: E11.9 ICD-9-CM: 250.00 Screen for colon cancer     ICD-10-CM: Z12.11 ICD-9-CM: V76.51 Hypercholesteremia     ICD-10-CM: E78.00 ICD-9-CM: 272.0 Essential hypertension, benign     ICD-10-CM: I10 
ICD-9-CM: 401.1 Lumbar spinal stenosis     ICD-10-CM: M48.06 
ICD-9-CM: 724.02 Herniation of intervertebral disc between L4 and L5     ICD-10-CM: M51.26 
ICD-9-CM: 722.10 Facet arthritis of lumbosacral region     ICD-10-CM: M46.97 
ICD-9-CM: 721.3 Vitals BP Pulse Temp Resp Height(growth percentile) Weight(growth percentile) 110/84 72 97.9 °F (36.6 °C) (Oral) 16 5' 1\" (1.549 m) 178 lb (80.7 kg) SpO2 BMI OB Status Smoking Status 98% 33.63 kg/m2 Postmenopausal Never Smoker BMI and BSA Data Body Mass Index Body Surface Area  
 33.63 kg/m 2 1.86 m 2 Preferred Pharmacy Pharmacy Name Phone [facility] [address], [address] [place] [address] Road [phone] Your Updated Medication List  
  
   
This list is accurate as of: 3/15/17  3:29 PM.  Always use your most recent med list.  
  
  
  
  
 acetaminophen-codeine 300-30 mg per tablet Commonly known as:  TYLENOL #3 Take 1 Tab by mouth every six (6) hours as needed for Pain. Max Daily Amount: 4 Tabs. aspirin 81 mg tablet Take  by mouth. cetirizine 10 mg tablet Commonly known as:  ZYRTEC Take 1 Tab by mouth daily. diphenoxylate-atropine 2.5-0.025 mg per tablet Commonly known as:  LOMOTIL Take 1 tablet by mouth four (4) times daily as needed for Diarrhea.  
  
 esomeprazole 40 mg capsule Commonly known as:  NexIUM  
TAKE ONE (1) CAPSULE TWICE DAILY. fenofibrate micronized 134 mg capsule Commonly known as:  LOFIBRA TAKE ONE CAPSULE DAILY. fenofibrate nanocrystallized 145 mg tablet Commonly known as:  [employer] Take 1 Tab by mouth daily. 134 mg daily  
  
 fluticasone 50 mcg/actuation nasal spray Commonly known as:  Euna Zen 2 Sprays by Both Nostrils route daily. ipratropium 0.06 % nasal spray Commonly known as:  ATROVENT  
2 Sprays by Both Nostrils route four (4) times daily. irbesartan 150 mg tablet Commonly known as:  AVAPRO TAKE ONE TABLET EVERY NIGHT AT BEDTIME. LORazepam 1 mg tablet Commonly known as:  ATIVAN Take 1 Tab by mouth every four (4) hours as needed for Anxiety.  Max Daily Amount: 6 mg.  
  
 metFORMIN 500 mg tablet Commonly known as:  GLUCOPHAGE Take 1 Tab by mouth two (2) times daily (with meals). mometasone 50 mcg/actuation nasal spray Commonly known as:  NASONEX  
2 Sprays by Both Nostrils route daily. pregabalin 150 mg capsule Commonly known as:  Kaylah Harada TAKE ONE (1) CAPSULE TWICE DAILY. SITagliptin 100 mg tablet Commonly known as:  Villa Muscat Take 1 Tab by mouth daily. Prescriptions Printed Refills  
 acetaminophen-codeine (TYLENOL #3) 300-30 mg per tablet 0 Sig: Take 1 Tab by mouth every six (6) hours as needed for Pain. Max Daily Amount: 4 Tabs. Class: Print Route: Oral  
  
We Performed the Following AMB POC GLUCOSE BLOOD, BY GLUCOSE MONITORING DEVICE [77939 CPT(R)] AMB POC HEMOGLOBIN A1C [18866 CPT(R)] AMB POC LIPID PROFILE [07804 CPT(R)] AMB POC URINE, MICROALBUMIN, SEMIQUANT (3 RESULTS) [19506 CPT(R)] OCCULT BLOOD, IMMUNOASSAY (FIT) [id number] CPT(R)] Follow-up Instructions Return in about 3 months (around 6/15/2017), or if symptoms worsen or fail to improve. Patient Instructions boolino Activation Thank you for requesting access to boolino. Please follow the instructions below to securely access and download your online medical record. boolino allows you to send messages to your doctor, view your test results, renew your prescriptions, schedule appointments, and more. How Do I Sign Up? 1. In your internet browser, go to [email or web address] 
2. Click on the First Time User? Click Here link in the Sign In box. You will be redirect to the New Member Sign Up page. 3. Enter your boolino Access Code exactly as it appears below. You will not need to use this code after youve completed the sign-up process. If you do not sign up before the expiration date, you must request a new code. boolino Access Code: Activation code not generated Current Sendmebox Status: Active (This is the date your Sendmebox access code will ) 4. Enter the last four digits of your Social Security Number (xxxx) and Date of Birth (mm/dd/yyyy) as indicated and click Submit. You will be taken to the next sign-up page. 5. Create a en-Gauget ID. This will be your Sendmebox login ID and cannot be changed, so think of one that is secure and easy to remember. 6. Create a Sendmebox password. You can change your password at any time. 7. Enter your Password Reset Question and Answer. This can be used at a later time if you forget your password. 8. Enter your e-mail address. You will receive e-mail notification when new information is available in [address] 9. Click Sign Up. You can now view and download portions of your medical record. 10. Click the Download Summary menu link to download a portable copy of your medical information. Additional Information If you have questions, please visit the Frequently Asked Questions section of the Sendmebox website at [email or web address] Remember, Sendmebox is NOT to be used for urgent needs. For medical emergencies, dial 911. Introducing [facility]! Dear Donte Sanchez: Thank you for requesting a Sendmebox account. Our records indicate that you already have an active Sendmebox account. You can access your account anytime at [email or web address] [place] [facility]/[employer] Did you know that you can access your hospital and ER discharge instructions at any time in Sendmebox? You can also review all of your test results from your hospital stay or ER visit. Additional Information If you have questions, please visit the Frequently Asked Questions section of the Sendmebox website at [email or web address] Remember, Sendmebox is NOT to be used for urgent needs. For medical emergencies, dial 911. Now available from your iPhone and Android! Please provide this summary of care documentation to your next provider. Your primary care clinician is listed as Brenda Napier. If you have any questions after today's visit, please call [phone].

## 2017-03-15 NOTE — PROGRESS NOTES
Celine Nguyễn is a 64 y.o. female and presents with Back Pain; Foot Exam; Diabetes; and Cholesterol Problem  . Diabetes Mellitus Review:  She has diabetes mellitus. Diabetic ROS - medication compliance: compliant all of the time, diabetic diet compliance: compliant all of the time, home glucose monitoring: is performed. Known diabetic complications: neuropathy  Cardiovascular risk factors: family history, dyslipidemia, diabetes mellitus, obesity, hypertension  Current diabetic medications include oral agents   Eye exam current (within one year): no  Weight trend: stable  Prior visit with dietician: no  Current diet: \"healthy\" diet  in general  Current exercise: walking  Current monitoring regimen: home blood tests - daily  Home blood sugar records: trend: stable  Any episodes of hypoglycemia? no  Is She on ACE inhibitor or angiotensin II receptor blocker? Yes     Back Pain Review:  Patient presents for evaluation of low back problems. Symptoms have been present for years and include pain in lower back (dull, mild in character; 4/10 in severity). Initial inciting event: unknowne. Symptoms are worst: at times. Alleviating factors identifiable by patient are lying flat, medication . Exacerbating factors identifiable by patient are bending forwards, bending backwards. Treatments so far initiated by patient: medication Previous lower back problems: reported. Previous workup: noted. She states she has been on Tramadol but does not take it daily but on average of 3 times weekly    Hypertension Review:  The patient has hypertension  Diet and Lifestyle: generally follows a  low sodium diet, exercises sporadically  Home BP Monitoring: is not measured at home. Pertinent ROS: taking medications as instructed, no medication side effects noted, no TIA's, no chest pain on exertion, no dyspnea on exertion, no swelling of ankles.        Review of Systems  Constitutional: negative for fevers, chills, anorexia and weight loss  Eyes:   negative for visual disturbance and irritation  ENT:   negative for tinnitus,sore throat,nasal congestion,ear pains. hoarseness  Respiratory:  negative for cough, hemoptysis, dyspnea,wheezing  CV:   negative for chest pain, palpitations, lower extremity edema  GI:   negative for nausea, vomiting, diarrhea, abdominal pain,melena  Endo:               negative for polyuria,polydipsia,polyphagia,heat intolerance  Genitourinary: negative for frequency, dysuria and hematuria  Integument:  negative for rash and pruritus  Hematologic:  negative for easy bruising and gum/nose bleeding  Musculoskel: back pain, muscle weakness, joint pain  Neurological:  negative for headaches, dizziness, vertigo, memory problems and gait   Behavl/Psych: negative for feelings of anxiety, depression, mood changes    Past Medical History:   Diagnosis Date    Acute cholecystitis 2/9/2011    Acute cystitis 2/9/2011    Cholecystitis 2/9/2011    Cholelithiasis 2/9/2011    Chronic Pain 2/9/2011    Essential hypertension, benign 2/9/2011    GERD (gastroesophageal reflux disease)     GERD, Gastro- Esophageal Reflux Diease (acid reflux) 2/9/2011    Herniated nucleus pulposus 2/9/2011    Hypercholesteremia 2/9/2011    Hypercholesteremia 2/9/2011    Hypertension     Neurogenic bladder, NOS 2/9/2011     Past Surgical History:   Procedure Laterality Date    HX GYN      HX HAMMER TOE REPAIR  6/21/2016    left foot      Social History     Social History    Marital status:      Spouse name: N/A    Number of children: N/A    Years of education: N/A     Social History Main Topics    Smoking status: Never Smoker    Smokeless tobacco: Never Used    Alcohol use No    Drug use: No    Sexual activity: Yes     Partners: Male     Other Topics Concern    None     Social History Narrative     Family History   Problem Relation Age of Onset    Heart Disease Mother     Cancer Father      Current Outpatient Prescriptions Medication Sig Dispense Refill    acetaminophen-codeine (TYLENOL #3) 300-30 mg per tablet Take 1 Tab by mouth every six (6) hours as needed for Pain. Max Daily Amount: 4 Tabs. 14 Tab 0    fenofibrate micronized (LOFIBRA) 134 mg capsule TAKE ONE CAPSULE DAILY. 30 Cap 6    irbesartan (AVAPRO) 150 mg tablet TAKE ONE TABLET EVERY NIGHT AT BEDTIME. 30 Tab 12    pregabalin (LYRICA) 150 mg capsule TAKE ONE (1) CAPSULE TWICE DAILY. 60 Cap 3    sitaGLIPtin (JANUVIA) 100 mg tablet Take 1 Tab by mouth daily. 30 Tab 12    fluticasone (FLONASE) 50 mcg/actuation nasal spray 2 Sprays by Both Nostrils route daily. 1 Bottle 12    ipratropium (ATROVENT) 0.06 % nasal spray 2 Sprays by Both Nostrils route four (4) times daily. 15 mL 0    cetirizine (ZYRTEC) 10 mg tablet Take 1 Tab by mouth daily. 30 Tab 0    metFORMIN (GLUCOPHAGE) 500 mg tablet Take 1 Tab by mouth two (2) times daily (with meals). 60 Tab 12    fenofibrate nanocrystallized (TRICOR) 145 mg tablet Take 1 Tab by mouth daily. 134 mg daily 30 Tab 12    esomeprazole (NEXIUM) 40 mg capsule TAKE ONE (1) CAPSULE TWICE DAILY. 60 Cap 12    LORazepam (ATIVAN) 1 mg tablet Take 1 Tab by mouth every four (4) hours as needed for Anxiety. Max Daily Amount: 6 mg. 4 Tab 0    diphenoxylate-atropine (LOMOTIL) 2.5-0.025 mg per tablet Take 1 tablet by mouth four (4) times daily as needed for Diarrhea. 60 tablet 0    mometasone (NASONEX) 50 mcg/actuation nasal spray 2 Sprays by Both Nostrils route daily. 1 Container 12    aspirin 81 mg tablet Take  by mouth.        No Known Allergies    Objective:  Visit Vitals    /84    Pulse 72    Temp 97.9 °F (36.6 °C) (Oral)    Resp 16    Ht 5' 1\" (1.549 m)    Wt 178 lb (80.7 kg)    SpO2 98%    BMI 33.63 kg/m2     Physical Exam:   General appearance - alert, well appearing, and in moderate distress  Mental status - alert, oriented to person, place, and time  EYE-LAURI, EOMI, corneas normal, no foreign bodies  ENT-ENT exam normal, no neck nodes or sinus tenderness  Nose - normal and patent, no erythema, discharge or polyps  Mouth - mucous membranes moist, pharynx normal without lesions  Neck - supple, no significant adenopathy   Chest - clear to auscultation, no wheezes, rales or rhonchi, symmetric air entry   Heart - normal rate, regular rhythm, normal S1, S2, no murmurs, rubs, clicks or gallops   Abdomen - soft, nontender, nondistended, no masses or organomegaly  Lymph- no adenopathy palpable  Ext-peripheral pulses normal, no pedal edema, no clubbing or cyanosis  Skin-Warm and dry. no hyperpigmentation, vitiligo, or suspicious lesions  Neuro -alert, oriented, normal speech, no focal findings or movement disorder noted  Neck-normal C-spine, no tenderness, full ROM without pain  Feet-no nail deformities or callus formation with good pulses noted  Back-.limited range of motion, pain with motion noted during exam, tenderness noted ls spine    Results for orders placed or performed in visit on 03/15/17   AMB POC GLUCOSE BLOOD, BY GLUCOSE MONITORING DEVICE   Result Value Ref Range    Glucose  mg/dL       Assessment/Plan:    ICD-10-CM ICD-9-CM    1. Controlled type 2 diabetes mellitus without complication, without long-term current use of insulin (HCC) E11.9 250.00 AMB POC GLUCOSE BLOOD, BY GLUCOSE MONITORING DEVICE      AMB POC HEMOGLOBIN A1C      AMB POC LIPID PROFILE      AMB POC URINE, MICROALBUMIN, SEMIQUANT (3 RESULTS)   2. Screen for colon cancer Z12.11 V76.51 OCCULT BLOOD, IMMUNOASSAY (FIT)   3. Hypercholesteremia E78.00 272.0 AMB POC GLUCOSE BLOOD, BY GLUCOSE MONITORING DEVICE      AMB POC HEMOGLOBIN A1C      AMB POC LIPID PROFILE      AMB POC URINE, MICROALBUMIN, SEMIQUANT (3 RESULTS)   4. Essential hypertension, benign I10 401.1 AMB POC GLUCOSE BLOOD, BY GLUCOSE MONITORING DEVICE      AMB POC HEMOGLOBIN A1C      AMB POC LIPID PROFILE      AMB POC URINE, MICROALBUMIN, SEMIQUANT (3 RESULTS)   5.  Lumbar spinal stenosis M48.06 724.02    6. Herniation of intervertebral disc between L4 and L5 M51.26 722.10    7. Facet arthritis of lumbosacral region M46.97 721.3      Orders Placed This Encounter    OCCULT BLOOD, IMMUNOASSAY (FIT)    AMB POC GLUCOSE BLOOD, BY GLUCOSE MONITORING DEVICE    AMB POC HEMOGLOBIN A1C    AMB POC LIPID PROFILE    AMB POC URINE, MICROALBUMIN, SEMIQUANT (3 RESULTS)    acetaminophen-codeine (TYLENOL #3) 300-30 mg per tablet     Sig: Take 1 Tab by mouth every six (6) hours as needed for Pain. Max Daily Amount: 4 Tabs. Dispense:  14 Tab     Refill:  0     lose weight, increase physical activity, follow low fat diet, follow low salt diet,Take 81mg aspirin daily  Patient Instructions   "[employer]hart Activation    Thank you for requesting access to Gema. Please follow the instructions below to securely access and download your online medical record. Gema allows you to send messages to your doctor, view your test results, renew your prescriptions, schedule appointments, and more. How Do I Sign Up? 1. In your internet browser, go to [email or web address]  2. Click on the First Time User? Click Here link in the Sign In box. You will be redirect to the New Member Sign Up page. 3. Enter your Gema Access Code exactly as it appears below. You will not need to use this code after youve completed the sign-up process. If you do not sign up before the expiration date, you must request a new code. Gema Access Code: Activation code not generated  Current Gema Status: Active (This is the date your Gema access code will )    4. Enter the last four digits of your Social Security Number (xxxx) and Date of Birth (mm/dd/yyyy) as indicated and click Submit. You will be taken to the next sign-up page. 5. Create a Gema ID. This will be your Gema login ID and cannot be changed, so think of one that is secure and easy to remember. 6. Create a Gema password.  You can change your password at any time.  7. Enter your Password Reset Question and Answer. This can be used at a later time if you forget your password. 8. Enter your e-mail address. You will receive e-mail notification when new information is available in [address] 9. Click Sign Up. You can now view and download portions of your medical record. 10. Click the Download Summary menu link to download a portable copy of your medical information. Additional Information    If you have questions, please visit the Frequently Asked Questions section of the [employer] website at [email or web address] Remember, [employer] is NOT to be used for urgent needs. For medical emergencies, dial 911. Follow-up Disposition:  Return in about 3 months (around 6/15/2017), or if symptoms worsen or fail to improve. I have reviewed with the patient details of the assessment and plan and all questions were answered. Relevent patient education was performed    An After Visit Summary was printed and given to the patient.

## 2017-03-16 LAB
CHOLEST SERPL-MCNC: 248 MG/DL
HBA1C MFR BLD HPLC: 7.8 %
HDLC SERPL-MCNC: 47 MG/DL
LDL CHOLESTEROL POC: 134
NON-HDL GOAL (POC): 200
TCHOL/HDL RATIO (POC): 5.2
TRIGL SERPL-MCNC: 330 MG/DL

## 2017-03-17 RX ORDER — METFORMIN HYDROCHLORIDE 500 MG/1
TABLET ORAL
Qty: 60 TAB | Refills: 12 | Status: SHIPPED | OUTPATIENT
Start: 2017-03-17 | End: 2018-04-25 | Stop reason: SDUPTHER

## 2017-03-17 RX ORDER — PANTOPRAZOLE SODIUM 40 MG/1
TABLET, DELAYED RELEASE ORAL
Qty: 60 TAB | Refills: 12 | Status: SHIPPED | OUTPATIENT
Start: 2017-03-17 | End: 2018-04-25 | Stop reason: SDUPTHER

## 2017-06-21 RX ORDER — IBUPROFEN 800 MG/1
TABLET ORAL
Qty: 60 TAB | Refills: 12 | Status: SHIPPED | OUTPATIENT
Start: 2017-06-21 | End: 2018-04-23 | Stop reason: ALTCHOICE

## 2017-07-10 ENCOUNTER — OFFICE VISIT (OUTPATIENT)
Dept: INTERNAL MEDICINE CLINIC | Age: 62
End: 2017-07-10

## 2017-07-10 VITALS
HEIGHT: 61 IN | WEIGHT: 184 LBS | BODY MASS INDEX: 34.74 KG/M2 | SYSTOLIC BLOOD PRESSURE: 110 MMHG | RESPIRATION RATE: 14 BRPM | DIASTOLIC BLOOD PRESSURE: 74 MMHG | HEART RATE: 79 BPM | OXYGEN SATURATION: 97 % | TEMPERATURE: 98 F

## 2017-07-10 DIAGNOSIS — E11.9 CONTROLLED TYPE 2 DIABETES MELLITUS WITHOUT COMPLICATION, WITHOUT LONG-TERM CURRENT USE OF INSULIN (HCC): ICD-10-CM

## 2017-07-10 DIAGNOSIS — E78.00 HYPERCHOLESTEREMIA: ICD-10-CM

## 2017-07-10 DIAGNOSIS — M12.811 ROTATOR CUFF ARTHROPATHY, RIGHT: Primary | ICD-10-CM

## 2017-07-10 DIAGNOSIS — Z12.11 COLON CANCER SCREENING: ICD-10-CM

## 2017-07-10 DIAGNOSIS — I10 ESSENTIAL HYPERTENSION, BENIGN: ICD-10-CM

## 2017-07-10 LAB
CHOLEST SERPL-MCNC: 208 MG/DL
GLUCOSE POC: 159 MG/DL
HBA1C MFR BLD HPLC: 7.7 %
HDLC SERPL-MCNC: 57 MG/DL
LDL CHOLESTEROL POC: 125 MG/DL
NON-HDL GOAL (POC): 152
TCHOL/HDL RATIO (POC): 3.7
TRIGL SERPL-MCNC: 133 MG/DL

## 2017-07-10 RX ORDER — TRIAMCINOLONE ACETONIDE 40 MG/ML
40 INJECTION, SUSPENSION INTRA-ARTICULAR; INTRAMUSCULAR ONCE
Qty: 1 VIAL | Refills: 0
Start: 2017-07-10 | End: 2017-07-10

## 2017-07-10 RX ORDER — CETIRIZINE HCL 10 MG
10 TABLET ORAL DAILY
Qty: 30 TAB | Refills: 3 | Status: SHIPPED | OUTPATIENT
Start: 2017-07-10

## 2017-07-10 NOTE — MR AVS SNAPSHOT
Visit Information Date & Time Provider Department Dept. Phone Encounter #  
 7/10/2017  3:00 PM Adrianna Hunter MD [facility] [phone] [id number] Follow-up Instructions Return in about 3 months (around 10/10/2017), or if symptoms worsen or fail to improve. Upcoming Health Maintenance Date Due  
 EYE EXAM RETINAL OR DILATED Q1 8/25/1965 Pneumococcal 19-64 Medium Risk (1 of 1 - PPSV23) 8/25/1974 FOBT Q 1 YEAR AGE 50-75 1/13/2017 MICROALBUMIN Q1 2/10/2017 DTaP/Tdap/Td series (1 - Tdap) 11/9/2017* INFLUENZA AGE 9 TO ADULT 8/1/2017 HEMOGLOBIN A1C Q6M 9/15/2017 PAP AKA CERVICAL CYTOLOGY 1/7/2018 LIPID PANEL Q1 3/15/2018 BREAST CANCER SCRN MAMMOGRAM 5/26/2018 FOOT EXAM Q1 7/10/2018 *Topic was postponed. The date shown is not the original due date. Allergies as of 7/10/2017  Review Complete On: 7/10/2017 By: Lamar Breen LPN No Known Allergies Current Immunizations  Reviewed on 10/10/2011 Name Date Influenza Vaccine Split 10/10/2011 Not reviewed this visit You Were Diagnosed With   
  
 Codes Comments Rotator cuff arthropathy, right    -  Primary ICD-10-CM: B36.533 ICD-9-CM: 716.81 Colon cancer screening     ICD-10-CM: Z12.11 ICD-9-CM: V76.51 Hypercholesteremia     ICD-10-CM: E78.00 ICD-9-CM: 272.0 Essential hypertension, benign     ICD-10-CM: I10 
ICD-9-CM: 401.1 Controlled type 2 diabetes mellitus without complication, without long-term current use of insulin ([facility]     ICD-10-CM: E11.9 ICD-9-CM: 250.00 Vitals BP Pulse Temp Resp Height(growth percentile) Weight(growth percentile) 110/74 79 98 °F (36.7 °C) (Oral) 14 5' 1\" (1.549 m) 184 lb (83.5 kg) SpO2 BMI OB Status Smoking Status 97% 34.77 kg/m2 Postmenopausal Never Smoker Vitals History BMI and BSA Data Body Mass Index Body Surface Area  34.77 kg/m 2 1.9 m 2  
  
  
 Preferred Pharmacy Pharmacy Name Phone [facility] [address], [address] [place] [address] [phone] Your Updated Medication List  
  
   
This list is accurate as of: 7/10/17  4:11 PM.  Always use your most recent med list.  
  
  
  
  
 aspirin 81 mg tablet Take  by mouth. cetirizine 10 mg tablet Commonly known as:  ZYRTEC Take 1 Tab by mouth daily. diphenoxylate-atropine 2.5-0.025 mg per tablet Commonly known as:  LOMOTIL Take 1 tablet by mouth four (4) times daily as needed for Diarrhea.  
  
 esomeprazole 40 mg capsule Commonly known as:  NexIUM  
TAKE ONE (1) CAPSULE TWICE DAILY. fenofibrate micronized 134 mg capsule Commonly known as:  LOFIBRA TAKE ONE CAPSULE DAILY. fenofibrate nanocrystallized 145 mg tablet Commonly known as:  [employer] Take 1 Tab by mouth daily. 134 mg daily  
  
 fluticasone 50 mcg/actuation nasal spray Commonly known as:  Leafy Few 2 Sprays by Both Nostrils route daily. ibuprofen 800 mg tablet Commonly known as:  MOTRIN  
TAKE ONE (1) TABLET TWICE DAILY. ipratropium 0.06 % nasal spray Commonly known as:  ATROVENT  
2 Sprays by Both Nostrils route four (4) times daily. irbesartan 150 mg tablet Commonly known as:  AVAPRO TAKE ONE TABLET EVERY NIGHT AT BEDTIME. LORazepam 1 mg tablet Commonly known as:  ATIVAN Take 1 Tab by mouth every four (4) hours as needed for Anxiety. Max Daily Amount: 6 mg.  
  
 metFORMIN 500 mg tablet Commonly known as:  GLUCOPHAGE  
TAKE ONE TABLET TWICE DAILY WITH MEALS.  
  
 mometasone 50 mcg/actuation nasal spray Commonly known as:  NASONEX  
2 Sprays by Both Nostrils route daily. pantoprazole 40 mg tablet Commonly known as:  PROTONIX  
TAKE ONE (1) TABLET TWICE DAILY. pregabalin 150 mg capsule Commonly known as:  Alex Hauser TAKE ONE (1) CAPSULE TWICE DAILY. SITagliptin 100 mg tablet Commonly known as:  Kel Hopper Take 1 Tab by mouth daily. triamcinolone acetonide 40 mg/mL injection Commonly known as:  KENALOG-40  
1 mL by IntraBURSal route once for 1 dose. Prescriptions Sent to Pharmacy Refills  
 cetirizine (ZYRTEC) 10 mg tablet 3 Sig: Take 1 Tab by mouth daily. Class: Normal  
 Pharmacy: [facility] [address] #: [phone] Route: Oral  
  
We Performed the Following AMB POC GLUCOSE BLOOD, BY GLUCOSE MONITORING DEVICE [28631 CPT(R)] AMB POC HEMOGLOBIN (HGB) [67823 CPT(R)] AMB POC HEMOGLOBIN A1C [58140 CPT(R)] OCCULT BLOOD, IMMUNOASSAY (FIT) [id number] CPT(R)] Follow-up Instructions Return in about 3 months (around 10/10/2017), or if symptoms worsen or fail to improve. Patient Instructions Duda Activation Thank you for requesting access to Duda. Please follow the instructions below to securely access and download your online medical record. Duda allows you to send messages to your doctor, view your test results, renew your prescriptions, schedule appointments, and more. How Do I Sign Up? 1. In your internet browser, go to [email or web address] 
2. Click on the First Time User? Click Here link in the Sign In box. You will be redirect to the New Member Sign Up page. 3. Enter your Duda Access Code exactly as it appears below. You will not need to use this code after youve completed the sign-up process. If you do not sign up before the expiration date, you must request a new code. Duda Access Code: Activation code not generated Current Duda Status: Active (This is the date your Duda access code will ) 4. Enter the last four digits of your Social Security Number (xxxx) and Date of Birth (mm/dd/yyyy) as indicated and click Submit. You will be taken to the next sign-up page. 5. Create a Duda ID.  This will be your Duda login ID and cannot be changed, so think of one that is secure and easy to remember. 6. Create a Qreativ Studio password. You can change your password at any time. 7. Enter your Password Reset Question and Answer. This can be used at a later time if you forget your password. 8. Enter your e-mail address. You will receive e-mail notification when new information is available in [address] 9. Click Sign Up. You can now view and download portions of your medical record. 10. Click the Download Summary menu link to download a portable copy of your medical information. Additional Information If you have questions, please visit the Frequently Asked Questions section of the Qreativ Studio website at [email or web address] ([email or web address]. Remember, Qreativ Studio is NOT to be used for urgent needs. For medical emergencies, dial 911. Introducing [facility] & [facility] SERVICES! Dear aMru Martinez: Thank you for requesting a Qreativ Studio account. Our records indicate that you already have an active Qreativ Studio account. You can access your account anytime at [email or web address] (formerly [email or web address]). [email or web address] (formerly AdInsight) Did you know that you can access your hospital and ER discharge instructions at any time in Qreativ Studio? You can also review all of your test results from your hospital stay or ER visit. Additional Information If you have questions, please visit the Frequently Asked Questions section of the Qreativ Studio website at [email or web address] ([email or web address]. Remember, Qreativ Studio is NOT to be used for urgent needs. For medical emergencies, dial 911. Now available from your iPhone and Android! Please provide this summary of care documentation to your next provider. Your primary care clinician is listed as Dereck Good. If you have any questions after today's visit, please call [phone].

## 2017-07-10 NOTE — PATIENT INSTRUCTIONS
[employer] Activation    Thank you for requesting access to Nearbox. Please follow the instructions below to securely access and download your online medical record. Nearbox allows you to send messages to your doctor, view your test results, renew your prescriptions, schedule appointments, and more. How Do I Sign Up? 1. In your internet browser, go to [email or web address]  2. Click on the First Time User? Click Here link in the Sign In box. You will be redirect to the New Member Sign Up page. 3. Enter your Nearbox Access Code exactly as it appears below. You will not need to use this code after youve completed the sign-up process. If you do not sign up before the expiration date, you must request a new code. Nearbox Access Code: Activation code not generated  Current Nearbox Status: Active (This is the date your Nearbox access code will )    4. Enter the last four digits of your Social Security Number (xxxx) and Date of Birth (mm/dd/yyyy) as indicated and click Submit. You will be taken to the next sign-up page. 5. Create a Nearbox ID. This will be your Nearbox login ID and cannot be changed, so think of one that is secure and easy to remember. 6. Create a Nearbox password. You can change your password at any time. 7. Enter your Password Reset Question and Answer. This can be used at a later time if you forget your password. 8. Enter your e-mail address. You will receive e-mail notification when new information is available in [address] 9. Click Sign Up. You can now view and download portions of your medical record. 10. Click the Download Summary menu link to download a portable copy of your medical information. Additional Information    If you have questions, please visit the Frequently Asked Questions section of the Nearbox website at [email or web address] Remember, Nearbox is NOT to be used for urgent needs. For medical emergencies, dial 911.

## 2017-07-10 NOTE — PROGRESS NOTES
Kirstin Tanner is a 64 y.o. female and presents with Diabetes; Cholesterol Problem; Hypertension; and Shoulder Pain  . Subjective:  Shoulder Pain Review:  Patient complains of right side shoulder pains have retured. The symptoms began months ago Course of symptoms since onset has been gradually worsening. Pain is described as overall severity = moderate. Symptoms were incited by no known event. Patient denies N/A. Therapy to date includes OTC analgesics:steroid injections effective    Dyslipidemia Review:  Patient presents for evaluation of lipids. Compliance with treatment thus far has been excellent. A repeat fasting lipid profile was done. The patient does not use medications that may worsen dyslipidemias (corticosteroids, progestins, anabolic steroids, diuretics, beta-blockers, amiodarone, cyclosporine, olanzapine). The patient exercises daily. The patient is not known to have coexisting coronary artery disease. Diabetes Mellitus Review:  She has diabetes mellitus. Diabetic ROS - medication compliance: compliant all of the time, diabetic diet compliance: compliant all of the time, home glucose monitoring: is performed. Known diabetic complications: none  Cardiovascular risk factors: family history, dyslipidemia, diabetes mellitus, obesity, hypertension  Current diabetic medications include oral agents   Eye exam current (within one year): no  Weight trend: stable  Prior visit with dietician: no  Current diet: \"healthy\" diet  in general  Current exercise: walking  Current monitoring regimen: home blood tests - daily  Home blood sugar records: trend: stable  Any episodes of hypoglycemia? no  Is She on ACE inhibitor or angiotensin II receptor blocker? Yes     . Review of Systems  Constitutional: negative for fevers, chills, anorexia and weight loss  Eyes:   negative for visual disturbance and irritation  ENT:   negative for tinnitus,sore throat,nasal congestion,ear pains. hoarseness  Respiratory: negative for cough, hemoptysis, dyspnea,wheezing  CV:   negative for chest pain, palpitations, lower extremity edema  GI:   nausea,  abdominal fullness  Endo:               negative for polyuria,polydipsia,polyphagia,heat intolerance  Genitourinary: negative for frequency, dysuria and hematuria  Integument:  negative for rash and pruritus  Hematologic:  negative for easy bruising and gum/nose bleeding  Musculoskel: negative for myalgias, arthralgias, back pain, muscle weakness, joint pain  Neurological:  negative for headaches, dizziness, vertigo, memory problems and gait   Behavl/Psych: negative for feelings of anxiety, depression, mood changes    Past Medical History:   Diagnosis Date    Acute cholecystitis 2/9/2011    Acute cystitis 2/9/2011    Cholecystitis 2/9/2011    Cholelithiasis 2/9/2011    Chronic Pain 2/9/2011    Essential hypertension, benign 2/9/2011    GERD (gastroesophageal reflux disease)     GERD, Gastro- Esophageal Reflux Diease (acid reflux) 2/9/2011    Herniated nucleus pulposus 2/9/2011    Hypercholesteremia 2/9/2011    Hypercholesteremia 2/9/2011    Hypertension     Neurogenic bladder, NOS 2/9/2011     Past Surgical History:   Procedure Laterality Date    HX GYN      HX HAMMER TOE REPAIR  6/21/2016    left foot      Social History     Social History    Marital status:      Spouse name: N/A    Number of children: N/A    Years of education: N/A     Social History Main Topics    Smoking status: Never Smoker    Smokeless tobacco: Never Used    Alcohol use No    Drug use: No    Sexual activity: Yes     Partners: Male     Other Topics Concern    None     Social History Narrative     Family History   Problem Relation Age of Onset    Heart Disease Mother     Cancer Father      Current Outpatient Prescriptions   Medication Sig Dispense Refill    cetirizine (ZYRTEC) 10 mg tablet Take 1 Tab by mouth daily.  30 Tab 3    triamcinolone acetonide (KENALOG-40) 40 mg/mL injection 1 mL by IntraBURSal route once for 1 dose. 1 Vial 0    ibuprofen (MOTRIN) 800 mg tablet TAKE ONE (1) TABLET TWICE DAILY. 60 Tab 12    pantoprazole (PROTONIX) 40 mg tablet TAKE ONE (1) TABLET TWICE DAILY. 60 Tab 12    metFORMIN (GLUCOPHAGE) 500 mg tablet TAKE ONE TABLET TWICE DAILY WITH MEALS. 60 Tab 12    fenofibrate micronized (LOFIBRA) 134 mg capsule TAKE ONE CAPSULE DAILY. 30 Cap 6    pregabalin (LYRICA) 150 mg capsule TAKE ONE (1) CAPSULE TWICE DAILY. 60 Cap 3    sitaGLIPtin (JANUVIA) 100 mg tablet Take 1 Tab by mouth daily. 30 Tab 12    ipratropium (ATROVENT) 0.06 % nasal spray 2 Sprays by Both Nostrils route four (4) times daily. 15 mL 0    fenofibrate nanocrystallized (TRICOR) 145 mg tablet Take 1 Tab by mouth daily. 134 mg daily 30 Tab 12    aspirin 81 mg tablet Take  by mouth.  irbesartan (AVAPRO) 150 mg tablet TAKE ONE TABLET EVERY NIGHT AT BEDTIME. 30 Tab 12    fluticasone (FLONASE) 50 mcg/actuation nasal spray 2 Sprays by Both Nostrils route daily. 1 Bottle 12    esomeprazole (NEXIUM) 40 mg capsule TAKE ONE (1) CAPSULE TWICE DAILY. 60 Cap 12    LORazepam (ATIVAN) 1 mg tablet Take 1 Tab by mouth every four (4) hours as needed for Anxiety. Max Daily Amount: 6 mg. 4 Tab 0    diphenoxylate-atropine (LOMOTIL) 2.5-0.025 mg per tablet Take 1 tablet by mouth four (4) times daily as needed for Diarrhea. 60 tablet 0    mometasone (NASONEX) 50 mcg/actuation nasal spray 2 Sprays by Both Nostrils route daily.  1 Container 12     No Known Allergies    Objective:  Visit Vitals    /74    Pulse 79    Temp 98 °F (36.7 °C) (Oral)    Resp 14    Ht 5' 1\" (1.549 m)    Wt 184 lb (83.5 kg)    SpO2 97%    BMI 34.77 kg/m2     Physical Exam:   General appearance - alert, well appearing, and in moderate distress  Mental status - alert, oriented to person, place, and time  EYE-LAURI, EOMI, corneas normal, no foreign bodies  ENT-ENT exam normal, no neck nodes or sinus tenderness  Nose - normal and patent, no erythema, discharge or polyps  Mouth - mucous membranes moist, pharynx normal without lesions  Neck - supple, no significant adenopathy   Chest - clear to auscultation, no wheezes, rales or rhonchi, symmetric air entry   Heart - normal rate, regular rhythm, normal S1, S2, no murmurs, rubs, clicks or gallops   Abdomen - soft, nontender, nondistended, no masses or organomegaly  Lymph- no adenopathy palpable  Ext-peripheral pulses normal, no pedal edema, no clubbing or cyanosis  Skin-Warm and dry. no hyperpigmentation, vitiligo, or suspicious lesions  Neuro -alert, oriented, normal speech, no focal findings or movement disorder noted  Neck-normal C-spine, no tenderness, full ROM without pain  Rt.shoulder-reduced range of motion of rt., subdeltoid tenderness    Results for orders placed or performed in visit on 03/15/17   AMB POC GLUCOSE BLOOD, BY GLUCOSE MONITORING DEVICE   Result Value Ref Range    Glucose  mg/dL   AMB POC HEMOGLOBIN A1C   Result Value Ref Range    Hemoglobin A1c (POC) 7.8 %   AMB POC LIPID PROFILE   Result Value Ref Range    Cholesterol (POC) 248     Triglycerides (POC) 330     HDL Cholesterol (POC) 47     LDL Cholesterol (POC) 134     Non-HDL Goal (POC) 200     TChol/HDL Ratio (POC) 5.2        Assessment/Plan:    ICD-10-CM ICD-9-CM    1. Rotator cuff arthropathy, right M12.811 716.81 OCCULT BLOOD, IMMUNOASSAY (FIT)   2. Colon cancer screening Z12.11 V76.51    3. Hypercholesteremia E78.00 272.0    4. Essential hypertension, benign I10 401.1    5.  Controlled type 2 diabetes mellitus without complication, without long-term current use of insulin (HCC) E11.9 250.00 AMB POC GLUCOSE BLOOD, BY GLUCOSE MONITORING DEVICE      AMB POC HEMOGLOBIN (HGB)      AMB POC HEMOGLOBIN A1C     Orders Placed This Encounter    OCCULT BLOOD, IMMUNOASSAY (FIT)    AMB POC GLUCOSE BLOOD, BY GLUCOSE MONITORING DEVICE    AMB POC HEMOGLOBIN (HGB)    AMB POC HEMOGLOBIN A1C    cetirizine (ZYRTEC) 10 mg tablet     Sig: Take 1 Tab by mouth daily. Dispense:  30 Tab     Refill:  3    triamcinolone acetonide (KENALOG-40) 40 mg/mL injection     Si mL by IntraBURSal route once for 1 dose. Dispense:  1 Vial     Refill:  0     lose weight, follow low fat diet, follow low salt diet,Take 81mg aspirin daily  Indications:   Symptomatic relief of pain    Procedure:  After consent was obtained, using sterile technique the right shoulder joint was prepped using alcohol. Local anesthetic used: 1% lidocaine. . The joint was entered and Kenalog 40 mg was mixed with 1% lidocaine 3 ml  and injected into the joint and the needle withdrawn. The procedure was well tolerated. The patient is asked to continue to rest the joint for a few more days before resuming regular activities. It may be more painful for the first 1-2 days. Watch for fever, or increased swelling or persistent pain in the joint. Call or return to clinic prn if such symptoms occur or there is failure to improve as anticipated.       [facility]  OFFICE PROCEDURE PROGRESS NOTE        Chart reviewed for the following:   Shelah Blizzard., MD, have reviewed the History, Physical and updated the Allergic reactions for [address] performed immediately prior to start of procedure:   I, Valentino Dearth., MD, have performed the following reviews on [address] prior to the start of the procedure:            * Patient was identified by name and date of birth   * Agreement on procedure being performed was verified  * Risks and Benefits explained to the patient  * Procedure site verified and marked as necessary  * Patient was positioned for comfort       Time:4:00pm      Date of procedure: 7/10/2017    Procedure performed by:  Valentino Dearth., MD    Patient assisted by: self    How tolerated by patient: tolerated the procedure well with no complications    Comments: nonev                Patient Instructions   [employer] Activation    Thank you for requesting access to quietrevolution. Please follow the instructions below to securely access and download your online medical record. quietrevolution allows you to send messages to your doctor, view your test results, renew your prescriptions, schedule appointments, and more. How Do I Sign Up? 1. In your internet browser, go to [email or web address]  2. Click on the First Time User? Click Here link in the Sign In box. You will be redirect to the New Member Sign Up page. 3. Enter your quietrevolution Access Code exactly as it appears below. You will not need to use this code after youve completed the sign-up process. If you do not sign up before the expiration date, you must request a new code. quietrevolution Access Code: Activation code not generated  Current quietrevolution Status: Active (This is the date your quietrevolution access code will )    4. Enter the last four digits of your Social Security Number (xxxx) and Date of Birth (mm/dd/yyyy) as indicated and click Submit. You will be taken to the next sign-up page. 5. Create a quietrevolution ID. This will be your quietrevolution login ID and cannot be changed, so think of one that is secure and easy to remember. 6. Create a quietrevolution password. You can change your password at any time. 7. Enter your Password Reset Question and Answer. This can be used at a later time if you forget your password. 8. Enter your e-mail address. You will receive e-mail notification when new information is available in [address] 9. Click Sign Up. You can now view and download portions of your medical record. 10. Click the Download Summary menu link to download a portable copy of your medical information. Additional Information    If you have questions, please visit the Frequently Asked Questions section of the quietrevolution website at [email or web address] Remember, quietrevolution is NOT to be used for urgent needs. For medical emergencies, dial 911. Follow-up Disposition:  Return in about 3 months (around 10/10/2017), or if symptoms worsen or fail to improve. I have reviewed with the patient details of the assessment and plan and all questions were answered. Relevent patient education was performed    An After Visit Summary was printed and given to the patient.

## 2017-09-06 DIAGNOSIS — G51.9 FACIAL NEUROPATHY: ICD-10-CM

## 2017-09-07 RX ORDER — PREGABALIN 150 MG/1
CAPSULE ORAL
Qty: 60 CAP | Refills: 3 | Status: SHIPPED | OUTPATIENT
Start: 2017-09-07 | End: 2017-11-15 | Stop reason: SDUPTHER

## 2017-09-07 RX ORDER — SITAGLIPTIN 100 MG/1
TABLET, FILM COATED ORAL
Qty: 30 TAB | Refills: 12 | Status: SHIPPED | OUTPATIENT
Start: 2017-09-07 | End: 2018-07-20 | Stop reason: SDUPTHER

## 2017-10-10 ENCOUNTER — OFFICE VISIT (OUTPATIENT)
Dept: INTERNAL MEDICINE CLINIC | Age: 62
End: 2017-10-10

## 2017-10-10 VITALS
OXYGEN SATURATION: 98 % | HEIGHT: 61 IN | HEART RATE: 67 BPM | RESPIRATION RATE: 16 BRPM | SYSTOLIC BLOOD PRESSURE: 110 MMHG | BODY MASS INDEX: 33.99 KG/M2 | DIASTOLIC BLOOD PRESSURE: 88 MMHG | TEMPERATURE: 98.5 F | WEIGHT: 180 LBS

## 2017-10-10 DIAGNOSIS — E78.00 HYPERCHOLESTEREMIA: ICD-10-CM

## 2017-10-10 DIAGNOSIS — E11.9 CONTROLLED TYPE 2 DIABETES MELLITUS WITHOUT COMPLICATION, WITHOUT LONG-TERM CURRENT USE OF INSULIN (HCC): Primary | ICD-10-CM

## 2017-10-10 DIAGNOSIS — I10 ESSENTIAL HYPERTENSION, BENIGN: ICD-10-CM

## 2017-10-10 LAB
ALBUMIN UR QL STRIP: 30 MG/L
CHOLEST SERPL-MCNC: 283 MG/DL
CREATININE, EXTERNAL: 0.86
CREATININE, URINE POC: 100 MG/DL
GLUCOSE POC: 171 MG/DL
HBA1C MFR BLD HPLC: 8 %
HDLC SERPL-MCNC: 61 MG/DL
LDL CHOLESTEROL POC: 182 MG/DL
MICROALBUMIN/CREAT RATIO POC: <30 MG/G
NON-HDL GOAL (POC): 222
TCHOL/HDL RATIO (POC): 4.6
TRIGL SERPL-MCNC: 201 MG/DL

## 2017-10-10 RX ORDER — LOSARTAN POTASSIUM 25 MG/1
25 TABLET ORAL DAILY
Qty: 90 TAB | Refills: 3 | Status: SHIPPED | OUTPATIENT
Start: 2017-10-10 | End: 2018-11-09 | Stop reason: SDUPTHER

## 2017-10-10 NOTE — PROGRESS NOTES
Akin Miller is a 58 y.o. female and presents with Diabetes and Hypertension  . Subjective:  Diabetes Mellitus Review:  She has diabetes mellitus. Diabetic ROS - medication compliance: compliant all of the time, diabetic diet compliance: compliant all of the time, home glucose monitoring: is performed. Known diabetic complications: none  Cardiovascular risk factors: family history, dyslipidemia, diabetes mellitus, obesity, hypertension  Current diabetic medications include oral agents/   Eye exam current (within one year): no  Weight trend: stable  Prior visit with dietician: no  Current diet: \"healthy\" diet  in general  Current exercise: walking  Current monitoring regimen: home blood tests - daily  Home blood sugar records: trend: stable  Any episodes of hypoglycemia? no  Is She on ACE inhibitor or angiotensin II receptor blocker? Yes       Hypertension Review:  The patient has essential hypertension  Diet and Lifestyle: generally follows a  low sodium diet, exercises sporadically  Home BP Monitoring: is not measured at home. Pertinent ROS: taking medications as instructed, no medication side effects noted, no TIA's, no chest pain on exertion, no dyspnea on exertion, no swelling of ankles. Allergic Rhinitis  Patient presents for evaluation of allergic symptoms. Symptoms include nasal congestion, rhinorrhea, sneezing, eye itching, watery eyes. Precipitants haved included possible pollen. He has had bouts of lightheadedness when standing      Review of Systems  Constitutional: negative for fevers, chills, anorexia and weight loss  Eyes:   negative for visual disturbance and irritation  ENT:   negative for tinnitus,sore throat,nasal congestion,ear pains. hoarseness  Respiratory:  negative for cough, hemoptysis, dyspnea,wheezing  CV:   negative for chest pain, palpitations, lower extremity edema  GI:   negative for nausea, vomiting, diarrhea, abdominal pain,melena  Endo:               negative for polyuria,polydipsia,polyphagia,heat intolerance  Genitourinary: negative for frequency, dysuria and hematuria  Integument:  negative for rash and pruritus  Hematologic:  negative for easy bruising and gum/nose bleeding  Musculoskel: negative for myalgias, arthralgias, back pain, muscle weakness, joint pain  Neurological:  negative for headaches, dizziness, vertigo, memory problems and gait   Behavl/Psych: negative for feelings of anxiety, depression, mood changes    Past Medical History:   Diagnosis Date    Acute cholecystitis 2/9/2011    Acute cystitis 2/9/2011    Cholecystitis 2/9/2011    Cholelithiasis 2/9/2011    Chronic Pain 2/9/2011    Essential hypertension, benign 2/9/2011    GERD (gastroesophageal reflux disease)     GERD, Gastro- Esophageal Reflux Diease (acid reflux) 2/9/2011    Herniated nucleus pulposus 2/9/2011    Hypercholesteremia 2/9/2011    Hypercholesteremia 2/9/2011    Hypertension     Neurogenic bladder, NOS 2/9/2011     Past Surgical History:   Procedure Laterality Date    HX GYN      HX HAMMER TOE REPAIR  6/21/2016    left foot      Social History     Social History    Marital status:      Spouse name: N/A    Number of children: N/A    Years of education: N/A     Social History Main Topics    Smoking status: Never Smoker    Smokeless tobacco: Never Used    Alcohol use No    Drug use: No    Sexual activity: Yes     Partners: Male     Other Topics Concern    None     Social History Narrative     Family History   Problem Relation Age of Onset    Heart Disease Mother     Cancer Father      Current Outpatient Prescriptions   Medication Sig Dispense Refill    losartan (COZAAR) 25 mg tablet Take 1 Tab by mouth daily. 90 Tab 3    JANUVIA 100 mg tablet TAKE 1 TABLET EVERY DAY. 30 Tab 12    pregabalin (LYRICA) 150 mg capsule TAKE ONE (1) CAPSULE TWICE DAILY. 60 Cap 3    cetirizine (ZYRTEC) 10 mg tablet Take 1 Tab by mouth daily.  30 Tab 3    ibuprofen (MOTRIN) 800 mg tablet TAKE ONE (1) TABLET TWICE DAILY. 60 Tab 12    pantoprazole (PROTONIX) 40 mg tablet TAKE ONE (1) TABLET TWICE DAILY. 60 Tab 12    fenofibrate micronized (LOFIBRA) 134 mg capsule TAKE ONE CAPSULE DAILY. 30 Cap 6    irbesartan (AVAPRO) 150 mg tablet TAKE ONE TABLET EVERY NIGHT AT BEDTIME. 30 Tab 12    ipratropium (ATROVENT) 0.06 % nasal spray 2 Sprays by Both Nostrils route four (4) times daily. 15 mL 0    aspirin 81 mg tablet Take  by mouth.  metFORMIN (GLUCOPHAGE) 500 mg tablet TAKE ONE TABLET TWICE DAILY WITH MEALS. 60 Tab 12    fluticasone (FLONASE) 50 mcg/actuation nasal spray 2 Sprays by Both Nostrils route daily. 1 Bottle 12    esomeprazole (NEXIUM) 40 mg capsule TAKE ONE (1) CAPSULE TWICE DAILY. 60 Cap 12    LORazepam (ATIVAN) 1 mg tablet Take 1 Tab by mouth every four (4) hours as needed for Anxiety. Max Daily Amount: 6 mg. 4 Tab 0    diphenoxylate-atropine (LOMOTIL) 2.5-0.025 mg per tablet Take 1 tablet by mouth four (4) times daily as needed for Diarrhea.  60 tablet 0     No Known Allergies    Objective:  Visit Vitals    /88    Pulse 67    Temp 98.5 °F (36.9 °C) (Oral)    Resp 16    Ht 5' 1\" (1.549 m)    Wt 180 lb (81.6 kg)    SpO2 98%    BMI 34.01 kg/m2     Physical Exam:   General appearance - alert, well appearing, and in no distress  Mental status - alert, oriented to person, place, and time  EYE-LAURI, EOMI, corneas normal, no foreign bodies  ENT-ENT exam normal, no neck nodes or sinus tenderness  Nose - normal and patent, no erythema, discharge or polyps  Mouth - mucous membranes moist, pharynx normal without lesions  Neck - supple, no significant adenopathy   Chest - clear to auscultation, no wheezes, rales or rhonchi, symmetric air entry   Heart - normal rate, regular rhythm, normal S1, S2, no murmurs, rubs, clicks or gallops   Abdomen - soft, nontender, nondistended, no masses or organomegaly  Lymph- no adenopathy palpable  Ext-peripheral pulses normal, no pedal edema, no clubbing or cyanosis  Skin-Warm and dry. no hyperpigmentation, vitiligo, or suspicious lesions  Neuro -alert, oriented, normal speech, no focal findings or movement disorder noted  Neck-normal C-spine, no tenderness, full ROM without pain  Feet-no nail deformities or callus formation with good pulses noted      Results for orders placed or performed in visit on 10/10/17   AMB POC GLUCOSE BLOOD, BY GLUCOSE MONITORING DEVICE   Result Value Ref Range    Glucose  mg/dL   AMB POC LIPID PROFILE   Result Value Ref Range    Cholesterol (POC) 283     Triglycerides (POC) 201     HDL Cholesterol (POC) 61     Non-HDL Goal (POC) 222     LDL Cholesterol (POC) 182 MG/DL    TChol/HDL Ratio (POC) 4.6    AMB POC HEMOGLOBIN A1C   Result Value Ref Range    Hemoglobin A1c (POC) 8.0 %       Assessment/Plan:    ICD-10-CM ICD-9-CM    1. Controlled type 2 diabetes mellitus without complication, without long-term current use of insulin (HCC) E11.9 250.00 AMB POC GLUCOSE BLOOD, BY GLUCOSE MONITORING DEVICE      AMB POC LIPID PROFILE      AMB POC HEMOGLOBIN A1C   2. Hypercholesteremia E78.00 272.0 AMB POC GLUCOSE BLOOD, BY GLUCOSE MONITORING DEVICE      AMB POC LIPID PROFILE      AMB POC HEMOGLOBIN A1C   3. Essential hypertension, benign I10 401.1 AMB POC GLUCOSE BLOOD, BY GLUCOSE MONITORING DEVICE      AMB POC LIPID PROFILE      AMB POC HEMOGLOBIN A1C      ECHO 2D ADULT      METABOLIC PANEL, COMPREHENSIVE      CBC W/O DIFF     Orders Placed This Encounter    METABOLIC PANEL, COMPREHENSIVE    CBC W/O DIFF    AMB POC GLUCOSE BLOOD, BY GLUCOSE MONITORING DEVICE    AMB POC LIPID PROFILE    AMB POC HEMOGLOBIN A1C    ECHO 2D ADULT     Standing Status:   Future     Standing Expiration Date:   10/11/2018     Order Specific Question:   Reason for Exam:     Answer:   dizziness    losartan (COZAAR) 25 mg tablet     Sig: Take 1 Tab by mouth daily.      Dispense:  90 Tab     Refill:  3     lose weight, increase physical activity, limit alcohol consumption, follow low fat diet, follow low salt diet,Take 81mg aspirin daily  Patient Instructions   [employer] Activation    Thank you for requesting access to [facility]. Please follow the instructions below to securely access and download your online medical record. C[facility]ion allows you to send messages to your doctor, view your test results, renew your prescriptions, schedule appointments, and more. How Do I Sign Up? 1. In your internet browser, go to [email or web address]  2. Click on the First Time User? Click Here link in the Sign In box. You will be redirect to the New Member Sign Up page. 3. Enter your Codesion Access Code exactly as it appears below. You will not need to use this code after youve completed the sign-up process. If you do not sign up before the expiration date, you must request a new code. Codesion Access Code: Activation code not generated  Current Codesion Status: Active (This is the date your Codesion access code will )    4. Enter the last four digits of your Social Security Number (xxxx) and Date of Birth (mm/dd/yyyy) as indicated and click Submit. You will be taken to the next sign-up page. 5. Create a Codesion ID. This will be your Codesion login ID and cannot be changed, so think of one that is secure and easy to remember. 6. Create a Codesion password. You can change your password at any time. 7. Enter your Password Reset Question and Answer. This can be used at a later time if you forget your password. 8. Enter your e-mail address. You will receive e-mail notification when new information is available in [address] 9. Click Sign Up. You can now view and download portions of your medical record. 10. Click the Download Summary menu link to download a portable copy of your medical information.     Additional Information    If you have questions, please visit the Frequently Asked Questions section of the Codesion website at [email or web address] Remember, [facility] is NOT to be used for urgent needs. For medical emergencies, dial 911. Follow-up Disposition:  Return in about 3 months (around 1/10/2018), or if symptoms worsen or fail to improve. I have reviewed with the patient details of the assessment and plan and all questions were answered. Relevent patient education was performed. The most recent lab findings were reviewed with the patient. An After Visit Summary was printed and given to the patient.

## 2017-10-10 NOTE — PATIENT INSTRUCTIONS
[employer] Activation    Thank you for requesting access to [facility]. Please follow the instructions below to securely access and download your online medical record. [facility] allows you to send messages to your doctor, view your test results, renew your prescriptions, schedule appointments, and more. How Do I Sign Up? 1. In your internet browser, go to [email or web address]  2. Click on the First Time User? Click Here link in the Sign In box. You will be redirect to the New Member Sign Up page. 3. Enter your [facility]Flava Access Code exactly as it appears below. You will not need to use this code after youve completed the sign-up process. If you do not sign up before the expiration date, you must request a new code. [facility] Access Code: Activation code not generated  Current 9Flava Status: Active (This is the date your 9Flava access code will )    4. Enter the last four digits of your Social Security Number (xxxx) and Date of Birth (mm/dd/yyyy) as indicated and click Submit. You will be taken to the next sign-up page. 5. Create a 9Flava ID. This will be your [facility]Flava login ID and cannot be changed, so think of one that is secure and easy to remember. 6. Create a 9Flava password. You can change your password at any time. 7. Enter your Password Reset Question and Answer. This can be used at a later time if you forget your password. 8. Enter your e-mail address. You will receive e-mail notification when new information is available in [address] 9. Click Sign Up. You can now view and download portions of your medical record. 10. Click the Download Summary menu link to download a portable copy of your medical information. Additional Information    If you have questions, please visit the Frequently Asked Questions section of the [facility]Flava website at [email or web address] Remember, [facility] is NOT to be used for urgent needs. For medical emergencies, dial 911.

## 2017-11-15 RX ORDER — PREGABALIN 150 MG/1
CAPSULE ORAL
Qty: 60 CAP | Refills: 3 | Status: SHIPPED | OUTPATIENT
Start: 2017-11-15 | End: 2018-04-23 | Stop reason: SDUPTHER

## 2018-01-05 RX ORDER — FENOFIBRATE 134 MG/1
CAPSULE ORAL
Qty: 30 CAP | Refills: 6 | Status: SHIPPED | OUTPATIENT
Start: 2018-01-05 | End: 2018-07-20 | Stop reason: SDUPTHER

## 2018-01-10 ENCOUNTER — OFFICE VISIT (OUTPATIENT)
Dept: INTERNAL MEDICINE CLINIC | Age: 63
End: 2018-01-10

## 2018-01-10 DIAGNOSIS — I10 ESSENTIAL HYPERTENSION, BENIGN: ICD-10-CM

## 2018-01-10 DIAGNOSIS — E78.00 HYPERCHOLESTEREMIA: ICD-10-CM

## 2018-01-10 DIAGNOSIS — E11.9 CONTROLLED TYPE 2 DIABETES MELLITUS WITHOUT COMPLICATION, WITHOUT LONG-TERM CURRENT USE OF INSULIN (HCC): ICD-10-CM

## 2018-01-10 DIAGNOSIS — M12.811 ROTATOR CUFF ARTHROPATHY, RIGHT: Primary | ICD-10-CM

## 2018-01-12 VITALS
HEART RATE: 65 BPM | WEIGHT: 184 LBS | BODY MASS INDEX: 34.74 KG/M2 | RESPIRATION RATE: 18 BRPM | OXYGEN SATURATION: 98 % | DIASTOLIC BLOOD PRESSURE: 60 MMHG | TEMPERATURE: 97.9 F | HEIGHT: 61 IN | SYSTOLIC BLOOD PRESSURE: 110 MMHG

## 2018-01-17 NOTE — PATIENT INSTRUCTIONS
[employer] Activation    Thank you for requesting access to [facility]. Please follow the instructions below to securely access and download your online medical record. [facility] allows you to send messages to your doctor, view your test results, renew your prescriptions, schedule appointments, and more. How Do I Sign Up? 1. In your internet browser, go to [email or web address]  2. Click on the First Time User? Click Here link in the Sign In box. You will be redirect to the New Member Sign Up page. 3. Enter your [facility]Beds Access Code exactly as it appears below. You will not need to use this code after youve completed the sign-up process. If you do not sign up before the expiration date, you must request a new code. [facility]eds Access Code: Activation code not generated  Current [facility]Beds Status: Active (This is the date your CloudBeds access code will )    4. Enter the last four digits of your Social Security Number (xxxx) and Date of Birth (mm/dd/yyyy) as indicated and click Submit. You will be taken to the next sign-up page. 5. Create a CloudBeds ID. This will be your CloudBeds login ID and cannot be changed, so think of one that is secure and easy to remember. 6. Create a CloudBeds password. You can change your password at any time. 7. Enter your Password Reset Question and Answer. This can be used at a later time if you forget your password. 8. Enter your e-mail address. You will receive e-mail notification when new information is available in [address] 9. Click Sign Up. You can now view and download portions of your medical record. 10. Click the Download Summary menu link to download a portable copy of your medical information. Additional Information    If you have questions, please visit the Frequently Asked Questions section of the CloudBeds website at [email or web address] Remember, [employer]Beds is NOT to be used for urgent needs. For medical emergencies, dial 911.

## 2018-01-17 NOTE — PROGRESS NOTES
Oral Whiting is a 58 y.o. female and presents with Shoulder Pain; Diabetes; and Hypertension  . Subjective:    Shoulder Pain Review:  Patient complains of right side shoulder pain. The symptoms began monthsago Course of symptoms since onset has been gradually worsening. Pain is described as overall severity = moderate. Symptoms were incited by no known event. Therapy to date includes OTC analgesics: effective. Diabetes Mellitus Review:  She has diabetes mellitus. Diabetic ROS - medication compliance: compliant all of the time, diabetic diet compliance: compliant all of the time, home glucose monitoring: is performed. Known diabetic complications: none  Cardiovascular risk factors: family history, dyslipidemia, diabetes mellitus, obesity, hypertension  Current diabetic medications include oral agents/   Eye exam current (within one year): no  Weight trend: stable  Prior visit with dietician: no  Current diet: \"healthy\" diet  in general  Current exercise: walking  Current monitoring regimen: home blood tests - daily  Home blood sugar records: trend: stable  Any episodes of hypoglycemia? no  Is She on ACE inhibitor or angiotensin II receptor blocker? Yes       Hypertension Review:  The patient has essential hypertension  Diet and Lifestyle: generally follows a  low sodium diet, exercises sporadically  Home BP Monitoring: is not measured at home. Pertinent ROS: taking medications as instructed, no medication side effects noted, no TIA's, no chest pain on exertion, no dyspnea on exertion, no swelling of ankles. Allergic Rhinitis  Patient presents for evaluation of allergic symptoms. Symptoms include nasal congestion, rhinorrhea, sneezing, eye itching, watery eyes. Precipitants haved included possible pollen.       He has had bouts of lightheadedness when standing      Review of Systems  Constitutional: negative for fevers, chills, anorexia and weight loss  Eyes:   negative for visual disturbance and irritation  ENT:   negative for tinnitus,sore throat,nasal congestion,ear pains. hoarseness  Respiratory:  negative for cough, hemoptysis, dyspnea,wheezing  CV:   negative for chest pain, palpitations, lower extremity edema  GI:   negative for nausea, vomiting, diarrhea, abdominal pain,melena  Endo:               negative for polyuria,polydipsia,polyphagia,heat intolerance  Genitourinary: negative for frequency, dysuria and hematuria  Integument:  negative for rash and pruritus  Hematologic:  negative for easy bruising and gum/nose bleeding  Musculoskel:  joint pain  Neurological:  negative for headaches, dizziness, vertigo, memory problems and gait   Behavl/Psych: negative for feelings of anxiety, depression, mood changes    Past Medical History:   Diagnosis Date    Acute cholecystitis 2/9/2011    Acute cystitis 2/9/2011    Cholecystitis 2/9/2011    Cholelithiasis 2/9/2011    Chronic Pain 2/9/2011    Essential hypertension, benign 2/9/2011    GERD (gastroesophageal reflux disease)     GERD, Gastro- Esophageal Reflux Diease (acid reflux) 2/9/2011    Herniated nucleus pulposus 2/9/2011    Hypercholesteremia 2/9/2011    Hypercholesteremia 2/9/2011    Hypertension     Neurogenic bladder, NOS 2/9/2011     Past Surgical History:   Procedure Laterality Date    HX GYN      HX HAMMER TOE REPAIR  6/21/2016    left foot      Social History     Social History    Marital status:      Spouse name: N/A    Number of children: N/A    Years of education: N/A     Social History Main Topics    Smoking status: Never Smoker    Smokeless tobacco: Never Used    Alcohol use No    Drug use: No    Sexual activity: Yes     Partners: Male     Other Topics Concern    None     Social History Narrative     Family History   Problem Relation Age of Onset    Heart Disease Mother     Cancer Father      Current Outpatient Prescriptions   Medication Sig Dispense Refill    fenofibrate micronized (LOFIBRA) 134 mg capsule TAKE ONE CAPSULE BY MOUTH EVERY DAY 30 Cap 6    pregabalin (LYRICA) 150 mg capsule TAKE ONE (1) CAPSULE TWICE DAILY. 60 Cap 3    losartan (COZAAR) 25 mg tablet Take 1 Tab by mouth daily. 90 Tab 3    JANUVIA 100 mg tablet TAKE 1 TABLET EVERY DAY. 30 Tab 12    cetirizine (ZYRTEC) 10 mg tablet Take 1 Tab by mouth daily. 30 Tab 3    ibuprofen (MOTRIN) 800 mg tablet TAKE ONE (1) TABLET TWICE DAILY. 60 Tab 12    pantoprazole (PROTONIX) 40 mg tablet TAKE ONE (1) TABLET TWICE DAILY. 60 Tab 12    metFORMIN (GLUCOPHAGE) 500 mg tablet TAKE ONE TABLET TWICE DAILY WITH MEALS. 60 Tab 12    irbesartan (AVAPRO) 150 mg tablet TAKE ONE TABLET EVERY NIGHT AT BEDTIME. 30 Tab 12    fluticasone (FLONASE) 50 mcg/actuation nasal spray 2 Sprays by Both Nostrils route daily. 1 Bottle 12    ipratropium (ATROVENT) 0.06 % nasal spray 2 Sprays by Both Nostrils route four (4) times daily. 15 mL 0    esomeprazole (NEXIUM) 40 mg capsule TAKE ONE (1) CAPSULE TWICE DAILY. 60 Cap 12    LORazepam (ATIVAN) 1 mg tablet Take 1 Tab by mouth every four (4) hours as needed for Anxiety. Max Daily Amount: 6 mg. 4 Tab 0    diphenoxylate-atropine (LOMOTIL) 2.5-0.025 mg per tablet Take 1 tablet by mouth four (4) times daily as needed for Diarrhea. 60 tablet 0    aspirin 81 mg tablet Take  by mouth.        No Known Allergies    Objective:  Visit Vitals    /60    Pulse 65    Temp 97.9 °F (36.6 °C) (Oral)    Resp 18    Ht 5' 1\" (1.549 m)    Wt 184 lb (83.5 kg)    SpO2 98%    BMI 34.77 kg/m2     Physical Exam:   General appearance - alert, well appearing, and in no distress  Mental status - alert, oriented to person, place, and time  EYE-LAURI, EOMI, corneas normal, no foreign bodies  ENT-ENT exam normal, no neck nodes or sinus tenderness  Nose - normal and patent, no erythema, discharge or polyps  Mouth - mucous membranes moist, pharynx normal without lesions  Neck - supple, no significant adenopathy   Chest - clear to auscultation, no wheezes, rales or rhonchi, symmetric air entry   Heart - normal rate, regular rhythm, normal S1, S2, no murmurs, rubs, clicks or gallops   Abdomen - soft, nontender, nondistended, no masses or organomegaly  Lymph- no adenopathy palpable  Ext-peripheral pulses normal, no pedal edema, no clubbing or cyanosis  Skin-Warm and dry. no hyperpigmentation, vitiligo, or suspicious lesions  Neuro -alert, oriented, normal speech, no focal findings or movement disorder noted  Neck-normal C-spine, no tenderness, full ROM without pain  Feet-no nail deformities or callus formation with good pulses noted  Rt.shoulder-reduced range of motion of rt., subdeltoid tenderness    Results for orders placed or performed in visit on 10/31/17   AMB EXT CREATININE   Result Value Ref Range    Creatinine, External 0.86        Assessment/Plan:    ICD-10-CM ICD-9-CM    1. Rotator cuff arthropathy, right M12.811 716.81    2. Hypercholesteremia E78.00 272.0    3. Essential hypertension, benign I10 401.1    4. Controlled type 2 diabetes mellitus without complication, without long-term current use of insulin (HCC) E11.9 250.00      No orders of the defined types were placed in this encounter. lose weight, increase physical activity, limit alcohol consumption, follow low fat diet, follow low salt diet,Take 81mg aspirin daily  Indications:   Symptomatic relief of pain    Procedure:  After consent was obtained, using sterile technique the right shoulder joint was prepped using alcohol. Local anesthetic used: 1% lidocaine. . The joint was entered and Kenalog 40 mg was mixed with 1% lidocaine 3 ml  and injected into the joint and the needle withdrawn. The procedure was well tolerated. The patient is asked to continue to rest the joint for a few more days before resuming regular activities. It may be more painful for the first 1-2 days. Watch for fever, or increased swelling or persistent pain in the joint.  Call or return to clinic prn if such symptoms occur or there is failure to improve as anticipated. [facility] PROCEDURE PROGRESS NOTE        Chart reviewed for the following:   Zulma Lugo MD, have reviewed the History, Physical and updated the Allergic reactions for [address] performed immediately prior to start of procedure:   I, Lore Mancera MD, have performed the following reviews on Frances Moran prior to the start of the procedure:            * Patient was identified by name and date of birth   * Agreement on procedure being performed was verified  * Risks and Benefits explained to the patient  * Procedure site verified and marked as necessary  * Patient was positioned for comfort       Time: 3:00pm      Date of procedure: 2018    Procedure performed by:  Lore Mancera MD    Patient assisted by: self    How tolerated by patient: tolerated the procedure well with no complications    Comments: none                Patient Instructions   MyChart Activation    Thank you for requesting access to [employer]. Please follow the instructions below to securely access and download your online medical record. [employer] allows you to send messages to your doctor, view your test results, renew your prescriptions, schedule appointments, and more. How Do I Sign Up? 1. In your internet browser, go to [email or web address]  2. Click on the First Time User? Click Here link in the Sign In box. You will be redirect to the New Member Sign Up page. 3. Enter your [employer] Access Code exactly as it appears below. You will not need to use this code after youve completed the sign-up process. If you do not sign up before the expiration date, you must request a new code. [employer] Access Code: Activation code not generated  Current [employer] Status: Active (This is the date your [employer] access code will )    4.  Enter the last four digits of your Social Security Number (xxxx) and Date of Birth (mm/dd/yyyy) as indicated and click Submit. You will be taken to the next sign-up page. 5. Create a Mirror[employer]t ID. This will be your [employer] login ID and cannot be changed, so think of one that is secure and easy to remember. 6. Create a [employer] password. You can change your password at any time. 7. Enter your Password Reset Question and Answer. This can be used at a later time if you forget your password. 8. Enter your e-mail address. You will receive e-mail notification when new information is available in [address] 9. Click Sign Up. You can now view and download portions of your medical record. 10. Click the Download Summary menu link to download a portable copy of your medical information. Additional Information    If you have questions, please visit the Frequently Asked Questions section of the [employer] website at [email or web address] Remember, [employer] is NOT to be used for urgent needs. For medical emergencies, dial 911. Follow-up Disposition:  Return in about 3 months (around 4/10/2018), or if symptoms worsen or fail to improve. I have reviewed with the patient details of the assessment and plan and all questions were answered. Relevent patient education was performed. The most recent lab findings were reviewed with the patient. An After Visit Summary was printed and given to the patient.

## 2018-01-29 LAB
CHOLEST SERPL-MCNC: 285 MG/DL
GLUCOSE POC: 75 MG/DL
HBA1C MFR BLD HPLC: 7.9 %
HDLC SERPL-MCNC: 49 MG/DL
LDL CHOLESTEROL POC: 200 MG/DL
NON-HDL GOAL (POC): 236
TCHOL/HDL RATIO (POC): 5.9
TRIGL SERPL-MCNC: 183 MG/DL

## 2018-04-23 ENCOUNTER — OFFICE VISIT (OUTPATIENT)
Dept: INTERNAL MEDICINE CLINIC | Age: 63
End: 2018-04-23

## 2018-04-23 VITALS
OXYGEN SATURATION: 96 % | TEMPERATURE: 98.9 F | DIASTOLIC BLOOD PRESSURE: 70 MMHG | HEIGHT: 61 IN | WEIGHT: 185 LBS | BODY MASS INDEX: 34.93 KG/M2 | RESPIRATION RATE: 16 BRPM | HEART RATE: 82 BPM | SYSTOLIC BLOOD PRESSURE: 122 MMHG

## 2018-04-23 DIAGNOSIS — I10 ESSENTIAL HYPERTENSION, BENIGN: ICD-10-CM

## 2018-04-23 DIAGNOSIS — M15.9 PRIMARY OSTEOARTHRITIS INVOLVING MULTIPLE JOINTS: ICD-10-CM

## 2018-04-23 DIAGNOSIS — E11.42 TYPE 2 DIABETES MELLITUS WITH DIABETIC POLYNEUROPATHY, WITHOUT LONG-TERM CURRENT USE OF INSULIN (HCC): ICD-10-CM

## 2018-04-23 DIAGNOSIS — J30.1 SEASONAL ALLERGIC RHINITIS DUE TO POLLEN: ICD-10-CM

## 2018-04-23 DIAGNOSIS — E78.00 HYPERCHOLESTEREMIA: Primary | ICD-10-CM

## 2018-04-23 LAB
CHOLEST SERPL-MCNC: 268 MG/DL
GLUCOSE POC: 161 MG/DL
HBA1C MFR BLD HPLC: 7.7 %
HDLC SERPL-MCNC: 57 MG/DL
LDL CHOLESTEROL POC: 187 MG/DL
NON HDL CHOL. (LDL+VLDL), 804568: 211
TCHOL/HDL RATIO (POC): 4.7
TRIGL SERPL-MCNC: 117 MG/DL

## 2018-04-23 RX ORDER — CETIRIZINE HCL 10 MG
10 TABLET ORAL DAILY
Qty: 30 TAB | Refills: 3 | Status: CANCELLED | OUTPATIENT
Start: 2018-04-23

## 2018-04-23 RX ORDER — PREGABALIN 150 MG/1
CAPSULE ORAL
Qty: 60 CAP | Refills: 3 | Status: SHIPPED | OUTPATIENT
Start: 2018-04-23 | End: 2018-08-06 | Stop reason: ALTCHOICE

## 2018-04-23 RX ORDER — FLUTICASONE PROPIONATE 50 MCG
2 SPRAY, SUSPENSION (ML) NASAL DAILY
Qty: 1 BOTTLE | Refills: 12 | Status: SHIPPED | OUTPATIENT
Start: 2018-04-23 | End: 2021-10-26 | Stop reason: ALTCHOICE

## 2018-04-23 RX ORDER — MELOXICAM 15 MG/1
15 TABLET ORAL DAILY
Qty: 30 TAB | Refills: 12 | Status: SHIPPED | OUTPATIENT
Start: 2018-04-23 | End: 2021-10-26 | Stop reason: ALTCHOICE

## 2018-04-23 NOTE — PROGRESS NOTES
Chief Complaint   Patient presents with    Cholesterol Problem    Hypertension    GERD     1. Have you been to the ER, urgent care clinic since your last visit? Hospitalized since your last visit? No    2. Have you seen or consulted any other health care providers outside of the [facility] since your last visit? Include any pap smears or colon screening.  No

## 2018-04-23 NOTE — MR AVS SNAPSHOT
[address] [place] [address],[address] [place] [id number] [phone] Patient: Aniceto Curran MRN: [id number] DXO:[id number] Visit Information Date & Time Provider Department Dept. Phone Encounter #  
 4/23/2018  3:00 PM Adrianna Hunter MD [facility] [phone] [id number] Follow-up Instructions Return in about 3 months (around 7/23/2018), or if symptoms worsen or fail to improve. Upcoming Health Maintenance Date Due  
 EYE EXAM RETINAL OR DILATED Q1 8/25/1965 Pneumococcal 19-64 Medium Risk (1 of 1 - PPSV23) 8/25/1974 DTaP/Tdap/Td series (1 - Tdap) 8/25/1976 FOBT Q 1 YEAR AGE 50-75 1/13/2017 Influenza Age 5 to Adult 8/1/2017 PAP AKA CERVICAL CYTOLOGY 1/7/2018 BREAST CANCER SCRN MAMMOGRAM 5/26/2018 FOOT EXAM Q1 7/10/2018 HEMOGLOBIN A1C Q6M 7/10/2018 MICROALBUMIN Q1 10/10/2018 LIPID PANEL Q1 1/10/2019 Allergies as of 4/23/2018  Review Complete On: 4/23/2018 By: Adrianna Hunter MD  
 No Known Allergies Current Immunizations  Reviewed on 10/10/2011 Name Date Influenza Vaccine Split 10/10/2011 Not reviewed this visit You Were Diagnosed With   
  
 Codes Comments Hypercholesteremia    -  Primary ICD-10-CM: E78.00 ICD-9-CM: 272.0 Essential hypertension, benign     ICD-10-CM: I10 
ICD-9-CM: 401.1 Seasonal allergic rhinitis due to pollen     ICD-10-CM: J30.1 ICD-9-CM: 477.0 Type 2 diabetes mellitus with diabetic polyneuropathy, without long-term current use of insulin (HCC)     ICD-10-CM: E11.42 
ICD-9-CM: 250.60, 357.2 Primary osteoarthritis involving multiple joints     ICD-10-CM: M15.0 ICD-9-CM: 715.09 Vitals BP Pulse Temp Resp Height(growth percentile) Weight(growth percentile) 122/70 (BP 1 Location: Right arm, BP Patient Position: Sitting) 82 98.9 °F (37.2 °C) (Oral) 16 5' 1\" (1.549 m) 185 lb (83.9 kg) SpO2 BMI OB Status Smoking Status 96% 34.96 kg/m2 Postmenopausal Never Smoker Vitals History BMI and BSA Data Body Mass Index Body Surface Area 34.96 kg/m 2 1.9 m 2 Preferred Pharmacy Pharmacy Name Phone [facility] [address], [address] [phone] Your Updated Medication List  
  
   
This list is accurate as of 4/23/18  3:58 PM.  Always use your most recent med list.  
  
  
  
  
 aspirin 81 mg tablet Take  by mouth. cetirizine 10 mg tablet Commonly known as:  ZYRTEC Take 1 Tab by mouth daily. diphenoxylate-atropine 2.5-0.025 mg per tablet Commonly known as:  LOMOTIL Take 1 tablet by mouth four (4) times daily as needed for Diarrhea.  
  
 esomeprazole 40 mg capsule Commonly known as:  NexIUM  
TAKE ONE (1) CAPSULE TWICE DAILY. fenofibrate micronized 134 mg capsule Commonly known as:  LOFIBRA TAKE ONE CAPSULE BY MOUTH EVERY DAY  
  
 * fluticasone 50 mcg/actuation nasal spray Commonly known as:  Sharita Hoot 2 Sprays by Both Nostrils route daily. * fluticasone 50 mcg/actuation nasal spray Commonly known as:  Sharita Hoot 2 Sprays by Both Nostrils route daily. ipratropium 42 mcg (0.06 %) nasal spray Commonly known as:  ATROVENT  
2 Sprays by Both Nostrils route four (4) times daily. irbesartan 150 mg tablet Commonly known as:  AVAPRO TAKE ONE TABLET EVERY NIGHT AT BEDTIME. JANUVIA 100 mg tablet Generic drug:  SITagliptin TAKE 1 TABLET EVERY DAY. LORazepam 1 mg tablet Commonly known as:  ATIVAN Take 1 Tab by mouth every four (4) hours as needed for Anxiety. Max Daily Amount: 6 mg.  
  
 losartan 25 mg tablet Commonly known as:  COZAAR Take 1 Tab by mouth daily. meloxicam 15 mg tablet Commonly known as:  MOBIC Take 1 Tab by mouth daily. metFORMIN 500 mg tablet Commonly known as:  GLUCOPHAGE  
TAKE ONE TABLET TWICE DAILY WITH MEALS. pantoprazole 40 mg tablet Commonly known as:  PROTONIX  
TAKE ONE (1) TABLET TWICE DAILY. pregabalin 150 mg capsule Commonly known as:  [place] TAKE ONE (1) CAPSULE TWICE DAILY. * Notice: This list has 2 medication(s) that are the same as other medications prescribed for you. Read the directions carefully, and ask your doctor or other care provider to review them with you. Prescriptions Printed Refills  
 pregabalin (LYRICA) 150 mg capsule 3 Sig: TAKE ONE (1) CAPSULE TWICE DAILY. Class: Print Prescriptions Sent to Pharmacy Refills  
 meloxicam (MOBIC) 15 mg tablet 12 Sig: Take 1 Tab by mouth daily. Class: Normal  
 Pharmacy: [facility] [address] [place] [address] Ph #: [phone] Route: Oral  
 fluticasone (FLONASE) 50 mcg/actuation nasal spray 12 Si Sprays by Both Nostrils route daily. Class: Normal  
 Pharmacy: [facility] [address] [place] [address] Ph #: [phone] Route: Both Nostrils We Performed the Following AMB POC GLUCOSE BLOOD, BY GLUCOSE MONITORING DEVICE [22464 CPT(R)] AMB POC HEMOGLOBIN A1C [85582 CPT(R)] AMB POC LIPID PROFILE [73250 CPT(R)] Follow-up Instructions Return in about 3 months (around 2018), or if symptoms worsen or fail to improve. Introducing [facility]! Dear Felipe Tobin: Thank you for requesting a [employer] account. Our records indicate that you already have an active [employer] account. You can access your account anytime at [email or web address] Did you know that you can access your hospital and ER discharge instructions at any time in [employer]? You can also review all of your test results from your hospital stay or ER visit. Additional Information If you have questions, please visit the Frequently Asked Questions section of the [employer] website at [email or web address] Remember, MyChart is NOT to be used for urgent needs. For medical emergencies, dial 911. Now available from your iPhone and Android! Please provide this summary of care documentation to your next provider. Your primary care clinician is listed as Tea Pan. If you have any questions after today's visit, please call [phone].

## 2018-04-23 NOTE — PROGRESS NOTES
Kat Romo is a 58 y.o. female and presents with Cholesterol Problem; Hypertension; and GERD  . Subjective:    Shoulder Pain Review:  Patient complains of right side shoulder pain. The symptoms began months ago Course of symptoms since onset has been intermittent Pain is described as overall severity = moderate. Symptoms were incited by no known event. Therapy to date includes OTC analgesics: effective. Diabetes Mellitus Review:  She has diabetes mellitus. Diabetic ROS - medication compliance: compliant all of the time, diabetic diet compliance: compliant all of the time, home glucose monitoring: is performed. Known diabetic complications: neuropathy in feet  Cardiovascular risk factors: family history, dyslipidemia, diabetes mellitus, obesity, hypertension  Current diabetic medications include oral agents/   Eye exam current (within one year): no  Weight trend: stable  Prior visit with dietician: no  Current diet: \"healthy\" diet  in general  Current exercise: walking  Current monitoring regimen: home blood tests - daily  Home blood sugar records: trend: stable  Any episodes of hypoglycemia? no  Is She on ACE inhibitor or angiotensin II receptor blocker? Yes       Hypertension Review:  The patient has essential hypertension  Diet and Lifestyle: generally follows a  low sodium diet, exercises sporadically  Home BP Monitoring: is not measured at home. Pertinent ROS: taking medications as instructed, no medication side effects noted, no TIA's, no chest pain on exertion, no dyspnea on exertion, no swelling of ankles. Review of Systems  Constitutional: negative for fevers, chills, anorexia and weight loss  Eyes:   negative for visual disturbance and irritation  ENT:   negative for tinnitus,sore throat,nasal congestion,ear pains. hoarseness  Respiratory:  negative for cough, hemoptysis, dyspnea,wheezing  CV:   negative for chest pain, palpitations, lower extremity edema  GI:   negative for nausea, vomiting, diarrhea, abdominal pain,melena  Endo:               negative for polyuria,polydipsia,polyphagia,heat intolerance  Genitourinary: negative for frequency, dysuria and hematuria  Integument:  negative for rash and pruritus  Hematologic:  negative for easy bruising and gum/nose bleeding  Musculoskel:  joint pain  Neurological:  negative for headaches, dizziness, vertigo, memory problems and gait   Behavl/Psych: negative for feelings of anxiety, depression, mood changes    Past Medical History:   Diagnosis Date    Acute cholecystitis 2/9/2011    Acute cystitis 2/9/2011    Cholecystitis 2/9/2011    Cholelithiasis 2/9/2011    Chronic Pain 2/9/2011    Essential hypertension, benign 2/9/2011    GERD (gastroesophageal reflux disease)     GERD, Gastro- Esophageal Reflux Diease (acid reflux) 2/9/2011    Herniated nucleus pulposus 2/9/2011    Hypercholesteremia 2/9/2011    Hypercholesteremia 2/9/2011    Hypertension     Neurogenic bladder, NOS 2/9/2011     Past Surgical History:   Procedure Laterality Date    HX GYN      HX HAMMER TOE REPAIR  6/21/2016    left foot      Social History     Social History    Marital status:      Spouse name: N/A    Number of children: N/A    Years of education: N/A     Social History Main Topics    Smoking status: Never Smoker    Smokeless tobacco: Never Used    Alcohol use No    Drug use: No    Sexual activity: Yes     Partners: Male     Other Topics Concern    None     Social History Narrative     Family History   Problem Relation Age of Onset    Heart Disease Mother     Cancer Father      Current Outpatient Prescriptions   Medication Sig Dispense Refill    meloxicam (MOBIC) 15 mg tablet Take 1 Tab by mouth daily. 30 Tab 12    fluticasone (FLONASE) 50 mcg/actuation nasal spray 2 Sprays by Both Nostrils route daily. 1 Bottle 12    pregabalin (LYRICA) 150 mg capsule TAKE ONE (1) CAPSULE TWICE DAILY.  60 Cap 3    fenofibrate micronized (LOFIBRA) 134 mg capsule TAKE ONE CAPSULE BY MOUTH EVERY DAY 30 Cap 6    losartan (COZAAR) 25 mg tablet Take 1 Tab by mouth daily. 90 Tab 3    JANUVIA 100 mg tablet TAKE 1 TABLET EVERY DAY. 30 Tab 12    cetirizine (ZYRTEC) 10 mg tablet Take 1 Tab by mouth daily. 30 Tab 3    pantoprazole (PROTONIX) 40 mg tablet TAKE ONE (1) TABLET TWICE DAILY. 60 Tab 12    metFORMIN (GLUCOPHAGE) 500 mg tablet TAKE ONE TABLET TWICE DAILY WITH MEALS. 60 Tab 12    ipratropium (ATROVENT) 0.06 % nasal spray 2 Sprays by Both Nostrils route four (4) times daily. 15 mL 0    aspirin 81 mg tablet Take  by mouth.  irbesartan (AVAPRO) 150 mg tablet TAKE ONE TABLET EVERY NIGHT AT BEDTIME. 30 Tab 12    fluticasone (FLONASE) 50 mcg/actuation nasal spray 2 Sprays by Both Nostrils route daily. 1 Bottle 12    esomeprazole (NEXIUM) 40 mg capsule TAKE ONE (1) CAPSULE TWICE DAILY. 60 Cap 12    LORazepam (ATIVAN) 1 mg tablet Take 1 Tab by mouth every four (4) hours as needed for Anxiety. Max Daily Amount: 6 mg. 4 Tab 0    diphenoxylate-atropine (LOMOTIL) 2.5-0.025 mg per tablet Take 1 tablet by mouth four (4) times daily as needed for Diarrhea.  60 tablet 0     No Known Allergies    Objective:  Visit Vitals    /70 (BP 1 Location: Right arm, BP Patient Position: Sitting)    Pulse 82    Temp 98.9 °F (37.2 °C) (Oral)    Resp 16    Ht 5' 1\" (1.549 m)    Wt 185 lb (83.9 kg)    SpO2 96%    BMI 34.96 kg/m2     Physical Exam:   General appearance - alert, well appearing, and in no distress  Mental status - alert, oriented to person, place, and time  EYE-LAURI, EOMI, corneas normal, no foreign bodies  ENT-ENT exam normal, no neck nodes or sinus tenderness  Nose - normal and patent, no erythema, discharge or polyps  Mouth - mucous membranes moist, pharynx normal without lesions  Neck - supple, no significant adenopathy   Chest - clear to auscultation, no wheezes, rales or rhonchi, symmetric air entry   Heart - normal rate, regular rhythm, normal S1, S2, no murmurs, rubs, clicks or gallops   Abdomen - soft, nontender, nondistended, no masses or organomegaly  Lymph- no adenopathy palpable  Ext-peripheral pulses normal, no pedal edema, no clubbing or cyanosis  Skin-Warm and dry. no hyperpigmentation, vitiligo, or suspicious lesions  Neuro -alert, oriented, normal speech, no focal findings or movement disorder noted  Neck-normal C-spine, no tenderness, full ROM without pain  Feet-no nail deformities or callus formation with good pulses noted  Rt.shoulder-reduced range of motion of rt., subdeltoid tenderness    Results for orders placed or performed in visit on 01/10/18   AMB POC LIPID PROFILE   Result Value Ref Range    Cholesterol (POC) 285     Triglycerides (POC) 183     HDL Cholesterol (POC) 49     LDL Cholesterol (POC) 200 MG/DL    Non-HDL Goal (POC) 236     TChol/HDL Ratio (POC) 5.9    AMB POC HEMOGLOBIN A1C   Result Value Ref Range    Hemoglobin A1c (POC) 7.9 %   AMB POC GLUCOSE BLOOD, BY GLUCOSE MONITORING DEVICE   Result Value Ref Range    Glucose POC 75 mg/dL       Assessment/Plan:    ICD-10-CM ICD-9-CM    1. Hypercholesteremia E78.00 272.0 AMB POC LIPID PROFILE   2. Essential hypertension, benign I10 401.1    3. Seasonal allergic rhinitis due to pollen J30.1 477.0 fluticasone (FLONASE) 50 mcg/actuation nasal spray   4. Type 2 diabetes mellitus with diabetic polyneuropathy, without long-term current use of insulin (HCC) E11.42 250.60 AMB POC HEMOGLOBIN A1C     357.2 AMB POC GLUCOSE BLOOD, BY GLUCOSE MONITORING DEVICE      pregabalin (LYRICA) 150 mg capsule   5. Primary osteoarthritis involving multiple joints M15.0 715.09 meloxicam (MOBIC) 15 mg tablet     Orders Placed This Encounter    AMB POC HEMOGLOBIN A1C    AMB POC GLUCOSE BLOOD, BY GLUCOSE MONITORING DEVICE    AMB POC LIPID PROFILE    meloxicam (MOBIC) 15 mg tablet     Sig: Take 1 Tab by mouth daily.      Dispense:  30 Tab     Refill:  12    fluticasone (FLONASE) 50 mcg/actuation nasal spray Si Sprays by Both Nostrils route daily. Dispense:  1 Bottle     Refill:  12    pregabalin (LYRICA) 150 mg capsule     Sig: TAKE ONE (1) CAPSULE TWICE DAILY. Dispense:  60 Cap     Refill:  3     lose weight, increase physical activity, limit alcohol consumption, follow low fat diet, follow low salt diet,Take 81mg aspirin daily  Indications:   Symptomatic relief of pain    Procedure:  After consent was obtained, using sterile technique the right shoulder joint was prepped using alcohol. Local anesthetic used: 1% lidocaine. . The joint was entered and Kenalog 40 mg was mixed with 1% lidocaine 3 ml  and injected into the joint and the needle withdrawn. The procedure was well tolerated. The patient is asked to continue to rest the joint for a few more days before resuming regular activities. It may be more painful for the first 1-2 days. Watch for fever, or increased swelling or persistent pain in the joint. Call or return to clinic prn if such symptoms occur or there is failure to improve as anticipated.       [facility]  OFFICE PROCEDURE PROGRESS NOTE        Chart reviewed for the following:   Charlotte Umana MD, have reviewed the History, Physical and updated the Allergic reactions for [address] performed immediately prior to start of procedure:   I, Tera Wills MD, have performed the following reviews on Jeff Salvage prior to the start of the procedure:            * Patient was identified by name and date of birth   * Agreement on procedure being performed was verified  * Risks and Benefits explained to the patient  * Procedure site verified and marked as necessary  * Patient was positioned for comfort       Time: 3:00pm      Date of procedure: 2018    Procedure performed by:  Tera Wills MD    Patient assisted by: self    How tolerated by patient: tolerated the procedure well with no complications    Comments: none                There are no Patient Instructions on file for this visit. Follow-up Disposition:  Return in about 3 months (around 7/23/2018), or if symptoms worsen or fail to improve. I have reviewed with the patient details of the assessment and plan and all questions were answered. Relevent patient education was performed. The most recent lab findings were reviewed with the patient. An After Visit Summary was printed and given to the patient.

## 2018-04-25 DIAGNOSIS — K21.9 GASTROESOPHAGEAL REFLUX DISEASE WITHOUT ESOPHAGITIS: ICD-10-CM

## 2018-04-26 RX ORDER — PANTOPRAZOLE SODIUM 40 MG/1
TABLET, DELAYED RELEASE ORAL
Qty: 60 TAB | Refills: 12 | Status: SHIPPED | OUTPATIENT
Start: 2018-04-26 | End: 2020-08-04

## 2018-04-26 RX ORDER — METFORMIN HYDROCHLORIDE 500 MG/1
TABLET ORAL
Qty: 60 TAB | Refills: 11 | Status: SHIPPED | OUTPATIENT
Start: 2018-04-26 | End: 2018-08-06 | Stop reason: ALTCHOICE

## 2018-04-26 NOTE — TELEPHONE ENCOUNTER
Shonda Bland LPN 2 hours ago (2:05 PM)                 Pt called and stated that the Meloxicam that [facility] sent down to [address], they do not carry.  Pt wanted to see if he could just resend Celebrex, which is what she has used before.  Pt can be reached @ [phone].  (Routing comment)

## 2018-04-27 RX ORDER — CELECOXIB 200 MG/1
200 CAPSULE ORAL DAILY
Qty: 30 CAP | Refills: 2 | Status: SHIPPED | OUTPATIENT
Start: 2018-04-27 | End: 2018-07-20 | Stop reason: SDUPTHER

## 2018-04-30 NOTE — TELEPHONE ENCOUNTER
Pt's insurance will not pay for meloxicam (Mobic) 15 mg tablet  But will pay for Diclofenac or Celebrex    Last OV 4/23/18

## 2018-07-20 DIAGNOSIS — E11.42 TYPE 2 DIABETES MELLITUS WITH DIABETIC POLYNEUROPATHY, WITHOUT LONG-TERM CURRENT USE OF INSULIN (HCC): ICD-10-CM

## 2018-07-20 DIAGNOSIS — G51.9 FACIAL NEUROPATHY: ICD-10-CM

## 2018-07-21 RX ORDER — FENOFIBRATE 134 MG/1
CAPSULE ORAL
Qty: 30 CAP | Refills: 6 | Status: SHIPPED | OUTPATIENT
Start: 2018-07-21 | End: 2018-07-25 | Stop reason: SDUPTHER

## 2018-07-21 RX ORDER — CELECOXIB 200 MG/1
200 CAPSULE ORAL DAILY
Qty: 30 CAP | Refills: 2 | Status: SHIPPED | OUTPATIENT
Start: 2018-07-21 | End: 2018-07-25 | Stop reason: SDUPTHER

## 2018-07-25 DIAGNOSIS — G51.9 FACIAL NEUROPATHY: ICD-10-CM

## 2018-07-25 RX ORDER — CELECOXIB 200 MG/1
200 CAPSULE ORAL DAILY
Qty: 30 CAP | Refills: 2 | Status: SHIPPED | COMMUNITY
Start: 2018-07-25 | End: 2018-09-20 | Stop reason: SDUPTHER

## 2018-07-25 RX ORDER — FENOFIBRATE 134 MG/1
CAPSULE ORAL
Qty: 30 CAP | Refills: 6 | Status: SHIPPED | COMMUNITY
Start: 2018-07-25 | End: 2018-09-04 | Stop reason: SDUPTHER

## 2018-07-26 ENCOUNTER — DOCUMENTATION ONLY (OUTPATIENT)
Dept: INTERNAL MEDICINE CLINIC | Age: 63
End: 2018-07-26

## 2018-07-26 NOTE — PROGRESS NOTES
Called/ faxed and notified pharmacy for med PA approval for Celecoxib 200mg caps. Provider informed.

## 2018-07-26 NOTE — PROGRESS NOTES
Called/ faxed and notified pharmacy for med PA approval for Celecoxib 200 mg caps. Provider informed.

## 2018-07-26 NOTE — PROGRESS NOTES
Med PA for Fenofibrate Micronized 134 mg cap was denied by insurance. Provider notified about reason for denial and alternative medications that are covered by pt's insurance for med change.

## 2018-08-01 ENCOUNTER — DOCUMENTATION ONLY (OUTPATIENT)
Dept: INTERNAL MEDICINE CLINIC | Age: 63
End: 2018-08-01

## 2018-08-01 NOTE — PROGRESS NOTES
Pt now has [facility] policy starting August 1st policy NT#([id number]). Restarted Med PA's for Januvia 100mg and Fenofibrate 134 mg. Both medications have been approved w/ no Med PA needed. Called and informed pharmacy of Med PA Approval and insurance change. They stated they will contact pt for new insurance info.

## 2018-08-06 ENCOUNTER — OFFICE VISIT (OUTPATIENT)
Dept: INTERNAL MEDICINE CLINIC | Age: 63
End: 2018-08-06

## 2018-08-06 VITALS
RESPIRATION RATE: 20 BRPM | OXYGEN SATURATION: 98 % | BODY MASS INDEX: 34.36 KG/M2 | WEIGHT: 182 LBS | SYSTOLIC BLOOD PRESSURE: 100 MMHG | HEART RATE: 82 BPM | TEMPERATURE: 98.9 F | DIASTOLIC BLOOD PRESSURE: 60 MMHG | HEIGHT: 61 IN

## 2018-08-06 DIAGNOSIS — K21.9 GASTROESOPHAGEAL REFLUX DISEASE WITHOUT ESOPHAGITIS: ICD-10-CM

## 2018-08-06 DIAGNOSIS — Z12.39 SCREENING FOR BREAST CANCER: ICD-10-CM

## 2018-08-06 DIAGNOSIS — E11.42 TYPE 2 DIABETES MELLITUS WITH DIABETIC POLYNEUROPATHY, WITHOUT LONG-TERM CURRENT USE OF INSULIN (HCC): Primary | ICD-10-CM

## 2018-08-06 DIAGNOSIS — M75.101 ROTATOR CUFF TEAR ARTHROPATHY OF RIGHT SHOULDER: ICD-10-CM

## 2018-08-06 DIAGNOSIS — E78.00 HYPERCHOLESTEROLEMIA: ICD-10-CM

## 2018-08-06 DIAGNOSIS — M12.811 ROTATOR CUFF TEAR ARTHROPATHY OF RIGHT SHOULDER: ICD-10-CM

## 2018-08-06 LAB
CHOLEST SERPL-MCNC: 277 MG/DL
GLUCOSE POC: 255 MG/DL
HBA1C MFR BLD HPLC: 8.9 %
HDLC SERPL-MCNC: 44 MG/DL
LDL CHOLESTEROL POC: 184 MG/DL
NON-HDL GOAL (POC): 234
TCHOL/HDL RATIO (POC): 6.3
TRIGL SERPL-MCNC: 248 MG/DL

## 2018-08-06 RX ORDER — TRIAMCINOLONE ACETONIDE 40 MG/ML
40 INJECTION, SUSPENSION INTRA-ARTICULAR; INTRAMUSCULAR ONCE
Qty: 1 ML | Refills: 0
Start: 2018-08-06 | End: 2018-08-06

## 2018-08-06 RX ORDER — GABAPENTIN 300 MG/1
300 CAPSULE ORAL 3 TIMES DAILY
Qty: 90 CAP | Refills: 3 | Status: SHIPPED | OUTPATIENT
Start: 2018-08-06 | End: 2018-08-06 | Stop reason: SDUPTHER

## 2018-08-06 RX ORDER — METFORMIN HYDROCHLORIDE 1000 MG/1
1000 TABLET ORAL 2 TIMES DAILY
Qty: 60 TAB | Refills: 12 | Status: SHIPPED | OUTPATIENT
Start: 2018-08-06 | End: 2020-04-16

## 2018-08-06 RX ORDER — GABAPENTIN 300 MG/1
300 CAPSULE ORAL 3 TIMES DAILY
Qty: 90 CAP | Refills: 3 | Status: SHIPPED | OUTPATIENT
Start: 2018-08-06 | End: 2018-09-04

## 2018-08-06 RX ORDER — METFORMIN HYDROCHLORIDE 1000 MG/1
1000 TABLET ORAL 2 TIMES DAILY
Qty: 60 TAB | Refills: 12 | Status: SHIPPED | OUTPATIENT
Start: 2018-08-06 | End: 2018-08-06 | Stop reason: SDUPTHER

## 2018-08-06 NOTE — MR AVS SNAPSHOT
[address] [place] [address],[address] [place] [address] 
[phone] Patient: Suzanne Carranza MRN: [id number] BAV:[id number] Visit Information Date & Time Provider Department Dept. Phone Encounter #  
 8/6/2018  1:45 PM Maribell Florence MD [address] [facility] [phone] [id number] Follow-up Instructions Return in about 4 weeks (around 9/3/2018), or if symptoms worsen or fail to improve. Upcoming Health Maintenance Date Due  
 EYE EXAM RETINAL OR DILATED Q1 8/25/1965 Pneumococcal 19-64 Medium Risk (1 of 1 - PPSV23) 8/25/1974 DTaP/Tdap/Td series (1 - Tdap) 8/25/1976 FOBT Q 1 YEAR AGE 50-75 1/13/2017 PAP AKA CERVICAL CYTOLOGY 1/7/2018 BREAST CANCER SCRN MAMMOGRAM 5/26/2018 FOOT EXAM Q1 7/10/2018 Influenza Age 5 to Adult 8/1/2018 MICROALBUMIN Q1 10/10/2018 HEMOGLOBIN A1C Q6M 10/23/2018 LIPID PANEL Q1 4/23/2019 Allergies as of 8/6/2018  Review Complete On: 8/6/2018 By: Maribell Florence MD  
 No Known Allergies Current Immunizations  Reviewed on 10/10/2011 Name Date Influenza Vaccine Split 10/10/2011 Not reviewed this visit You Were Diagnosed With   
  
 Codes Comments Type 2 diabetes mellitus with diabetic polyneuropathy, without long-term current use of insulin (HCC)    -  Primary ICD-10-CM: E11.42 
ICD-9-CM: 250.60, 357.2 Gastroesophageal reflux disease without esophagitis     ICD-10-CM: K21.9 ICD-9-CM: 530.81 Screening for breast cancer     ICD-10-CM: Z12.31 
ICD-9-CM: V76.10 Hypercholesterolemia     ICD-10-CM: E78.00 ICD-9-CM: 272.0 Rotator cuff tear arthropathy of right shoulder     ICD-10-CM: M12.811 ICD-9-CM: 716.81 Vitals BP Pulse Temp Resp Height(growth percentile) Weight(growth percentile) 100/60 (BP 1 Location: Right arm, BP Patient Position: Sitting) 82 98.9 °F (37.2 °C) (Oral) 20 5' 1\" (1.549 m) 182 lb (82.6 kg) SpO2 BMI OB Status Smoking Status 98% 34.39 kg/m2 Postmenopausal Never Smoker BMI and BSA Data Body Mass Index Body Surface Area  
 34.39 kg/m 2 1.89 m 2 Preferred Pharmacy Pharmacy Name Phone [facility]/PH[facility]Y #[id number] Holger Haines [address] [place] [address] [phone] Your Updated Medication List  
  
   
This list is accurate as of 8/6/18  3:02 PM.  Always use your most recent med list.  
  
  
  
  
 aspirin 81 mg tablet Take  by mouth. celecoxib 200 mg capsule Commonly known as:  CELEBREX Take 1 Cap by mouth daily. cetirizine 10 mg tablet Commonly known as:  ZYRTEC Take 1 Tab by mouth daily. diphenoxylate-atropine 2.5-0.025 mg per tablet Commonly known as:  LOMOTIL Take 1 tablet by mouth four (4) times daily as needed for Diarrhea.  
  
 esomeprazole 40 mg capsule Commonly known as:  NexIUM  
TAKE ONE (1) CAPSULE TWICE DAILY. fenofibrate micronized 134 mg capsule Commonly known as:  LOFIBRA TAKE ONE CAPSULE BY MOUTH EVERY DAY  
  
 * fluticasone 50 mcg/actuation nasal spray Commonly known as:  Boby Metro 2 Sprays by Both Nostrils route daily. * fluticasone 50 mcg/actuation nasal spray Commonly known as:  Boby Metro 2 Sprays by Both Nostrils route daily. gabapentin 300 mg capsule Commonly known as:  NEURONTIN Take 1 Cap by mouth three (3) times daily. ipratropium 42 mcg (0.06 %) nasal spray Commonly known as:  ATROVENT  
2 Sprays by Both Nostrils route four (4) times daily. irbesartan 150 mg tablet Commonly known as:  AVAPRO TAKE ONE TABLET EVERY NIGHT AT BEDTIME. LORazepam 1 mg tablet Commonly known as:  ATIVAN Take 1 Tab by mouth every four (4) hours as needed for Anxiety. Max Daily Amount: 6 mg.  
  
 losartan 25 mg tablet Commonly known as:  COZAAR Take 1 Tab by mouth daily. meloxicam 15 mg tablet Commonly known as:  MOBIC Take 1 Tab by mouth daily. metFORMIN 1,000 mg tablet Commonly known as:  GLUCOPHAGE Take 1 Tab by mouth two (2) times a day. pantoprazole 40 mg tablet Commonly known as:  PROTONIX  
TAKE ONE TABLET BY MOUTH TWICE A DAY SITagliptin 100 mg tablet Commonly known as:  Atlee Lavender TAKE 1 TABLET EVERY DAY. triamcinolone acetonide 40 mg/mL injection Commonly known as:  KENALOG  
1 mL by Intra artICUlar route once for 1 dose. * Notice: This list has 2 medication(s) that are the same as other medications prescribed for you. Read the directions carefully, and ask your doctor or other care provider to review them with you. Prescriptions Sent to Pharmacy Refills  
 metFORMIN (GLUCOPHAGE) 1,000 mg tablet 12 Sig: Take 1 Tab by mouth two (2) times a day. Class: Normal  
 Pharmacy: [address] [place] [address] Ph #: [phone] Route: Oral  
 gabapentin (NEURONTIN) 300 mg capsule 3 Sig: Take 1 Cap by mouth three (3) times daily. Class: Normal  
 Pharmacy: [address] [place] [address] Ph #: [phone] Route: Oral  
  
We Performed the Following AMB POC GLUCOSE BLOOD, BY GLUCOSE MONITORING DEVICE [87577 CPT(R)] AMB POC HEMOGLOBIN A1C [61236 CPT(R)] AMB POC LIPID PROFILE [00568 CPT(R)] OCCULT BLOOD IMMUNOASSAY,DIAGNOSTIC [66099 CPT(R)] OCCULT BLOOD IMMUNOASSAY,DIAGNOSTIC [89435 CPT(R)] TRIAMCINOLONE ACETONIDE INJ [ [facility]] Follow-up Instructions Return in about 4 weeks (around 9/3/2018), or if symptoms worsen or fail to improve. Patient Instructions "[employer]"hart Activation Thank you for requesting access to Groopt. Please follow the instructions below to securely access and download your online medical record. Groopt allows you to send messages to your doctor, view your test results, renew your prescriptions, schedule appointments, and more. How Do I Sign Up? 1. In your internet browser, go to [email or web address] 
2. Click on the First Time User? Click Here link in the Sign In box. You will be redirect to the New Member Sign Up page. 3. Enter your Foxwordy Access Code exactly as it appears below. You will not need to use this code after youve completed the sign-up process. If you do not sign up before the expiration date, you must request a new code. rag & bonehart Access Code: Activation code not generated Current Foxwordy Status: Active (This is the date your eriQoot access code will ) 4. Enter the last four digits of your Social Security Number (xxxx) and Date of Birth (mm/dd/yyyy) as indicated and click Submit. You will be taken to the next sign-up page. 5. Create a eriQoot ID. This will be your Foxwordy login ID and cannot be changed, so think of one that is secure and easy to remember. 6. Create a Foxwordy password. You can change your password at any time. 7. Enter your Password Reset Question and Answer. This can be used at a later time if you forget your password. 8. Enter your e-mail address. You will receive e-mail notification when new information is available in [address] 9. Click Sign Up. You can now view and download portions of your medical record. 10. Click the Download Summary menu link to download a portable copy of your medical information. Additional Information If you have questions, please visit the Frequently Asked Questions section of the Foxwordy website at [email or web address] Remember, Foxwordy is NOT to be used for urgent needs. For medical emergencies, dial 911. Introducing [facility] & HEALTH [facility]! Dear Melina Benavides: Thank you for requesting a Foxwordy account. Our records indicate that you already have an active Foxwordy account. You can access your account anytime at [email or web address] Did you know that you can access your hospital and ER discharge instructions at any time in Skyrobotic? You can also review all of your test results from your hospital stay or ER visit. Additional Information If you have questions, please visit the Frequently Asked Questions section of the Sky[employer]otic website at [email or web address] Remember, Skyrobotic is NOT to be used for urgent needs. For medical emergencies, dial 911. Now available from your iPhone and Android! Please provide this summary of care documentation to your next provider. Your primary care clinician is listed as Darinel Urbano. If you have any questions after today's visit, please call [phone].

## 2018-08-06 NOTE — PROGRESS NOTES
Staci Head is a 58 y.o. female and presents with Diabetes and Shoulder Pain  . Subjective:    Shoulder Pain Review:  Patient complains of right side shoulder pain. The symptoms began months ago Course of symptoms since onset has been intermittent Pain is described as overall severity = moderate. Symptoms were incited by no known event. Therapy to date includes OTC analgesics: effective. Diabetes Mellitus Review:  She has diabetes mellitus. Diabetic ROS - medication compliance: compliant all of the time, diabetic diet compliance: compliant all of the time, home glucose monitoring: is performed. Known diabetic complications: neuropathy in feet  Cardiovascular risk factors: family history, dyslipidemia, diabetes mellitus, obesity, hypertension  Current diabetic medications include oral agents/   Eye exam current (within one year): no  Weight trend: stable  Prior visit with dietician: no  Current diet: \"healthy\" diet  in general  Current exercise: walking  Current monitoring regimen: home blood tests - daily  Home blood sugar records: trend: stable  Any episodes of hypoglycemia? no  Is She on ACE inhibitor or angiotensin II receptor blocker? Yes       Hypertension Review:  The patient has essential hypertension  Diet and Lifestyle: generally follows a  low sodium diet, exercises sporadically  Home BP Monitoring: is not measured at home. Pertinent ROS: taking medications as instructed, no medication side effects noted, no TIA's, no chest pain on exertion, no dyspnea on exertion, no swelling of ankles. Review of Systems  Constitutional: negative for fevers, chills, anorexia and weight loss  Eyes:   negative for visual disturbance and irritation  ENT:   negative for tinnitus,sore throat,nasal congestion,ear pains. hoarseness  Respiratory:  negative for cough, hemoptysis, dyspnea,wheezing  CV:   negative for chest pain, palpitations, lower extremity edema  GI:   negative for nausea, vomiting, diarrhea, abdominal pain,melena  Endo:               negative for polyuria,polydipsia,polyphagia,heat intolerance  Genitourinary: negative for frequency, dysuria and hematuria  Integument:  negative for rash and pruritus  Hematologic:  negative for easy bruising and gum/nose bleeding  Musculoskel:  joint pain  Neurological:  negative for headaches, dizziness, vertigo, memory problems and gait   Behavl/Psych: negative for feelings of anxiety, depression, mood changes    Past Medical History:   Diagnosis Date    Acute cholecystitis 2/9/2011    Acute cystitis 2/9/2011    Cholecystitis 2/9/2011    Cholelithiasis 2/9/2011    Chronic Pain 2/9/2011    Essential hypertension, benign 2/9/2011    GERD (gastroesophageal reflux disease)     GERD, Gastro- Esophageal Reflux Diease (acid reflux) 2/9/2011    Herniated nucleus pulposus 2/9/2011    Hypercholesteremia 2/9/2011    Hypercholesteremia 2/9/2011    Hypertension     Neurogenic bladder, NOS 2/9/2011     Past Surgical History:   Procedure Laterality Date    HX GYN      HX HAMMER TOE REPAIR  6/21/2016    left foot      Social History     Social History    Marital status:      Spouse name: N/A    Number of children: N/A    Years of education: N/A     Social History Main Topics    Smoking status: Never Smoker    Smokeless tobacco: Never Used    Alcohol use No    Drug use: No    Sexual activity: Yes     Partners: Male     Other Topics Concern    None     Social History Narrative     Family History   Problem Relation Age of Onset    Heart Disease Mother     Cancer Father      Current Outpatient Prescriptions   Medication Sig Dispense Refill    metFORMIN (GLUCOPHAGE) 1,000 mg tablet Take 1 Tab by mouth two (2) times a day. 60 Tab 12    gabapentin (NEURONTIN) 300 mg capsule Take 1 Cap by mouth three (3) times daily.  90 Cap 3    fenofibrate micronized (LOFIBRA) 134 mg capsule TAKE ONE CAPSULE BY MOUTH EVERY DAY 30 Cap 6    SITagliptin (JANUVIA) 100 mg tablet TAKE 1 TABLET EVERY DAY. 30 Tab 12    celecoxib (CELEBREX) 200 mg capsule Take 1 Cap by mouth daily. 30 Cap 2    pantoprazole (PROTONIX) 40 mg tablet TAKE ONE TABLET BY MOUTH TWICE A DAY 60 Tab 12    meloxicam (MOBIC) 15 mg tablet Take 1 Tab by mouth daily. 30 Tab 12    fluticasone (FLONASE) 50 mcg/actuation nasal spray 2 Sprays by Both Nostrils route daily. 1 Bottle 12    losartan (COZAAR) 25 mg tablet Take 1 Tab by mouth daily. 90 Tab 3    cetirizine (ZYRTEC) 10 mg tablet Take 1 Tab by mouth daily. 30 Tab 3    irbesartan (AVAPRO) 150 mg tablet TAKE ONE TABLET EVERY NIGHT AT BEDTIME. 30 Tab 12    fluticasone (FLONASE) 50 mcg/actuation nasal spray 2 Sprays by Both Nostrils route daily. 1 Bottle 12    ipratropium (ATROVENT) 0.06 % nasal spray 2 Sprays by Both Nostrils route four (4) times daily. 15 mL 0    esomeprazole (NEXIUM) 40 mg capsule TAKE ONE (1) CAPSULE TWICE DAILY. 60 Cap 12    LORazepam (ATIVAN) 1 mg tablet Take 1 Tab by mouth every four (4) hours as needed for Anxiety. Max Daily Amount: 6 mg. 4 Tab 0    diphenoxylate-atropine (LOMOTIL) 2.5-0.025 mg per tablet Take 1 tablet by mouth four (4) times daily as needed for Diarrhea. 60 tablet 0    aspirin 81 mg tablet Take  by mouth.        No Known Allergies    Objective:  Visit Vitals    /60 (BP 1 Location: Right arm, BP Patient Position: Sitting)    Pulse 82    Temp 98.9 °F (37.2 °C) (Oral)    Resp 20    Ht 5' 1\" (1.549 m)    Wt 182 lb (82.6 kg)    SpO2 98%    BMI 34.39 kg/m2     Physical Exam:   General appearance - alert, well appearing, and in no distress  Mental status - alert, oriented to person, place, and time  EYE-LAURI, EOMI, corneas normal, no foreign bodies  ENT-ENT exam normal, no neck nodes or sinus tenderness  Nose - normal and patent, no erythema, discharge or polyps  Mouth - mucous membranes moist, pharynx normal without lesions  Neck - supple, no significant adenopathy   Chest - clear to auscultation, no wheezes, rales or rhonchi, symmetric air entry   Heart - normal rate, regular rhythm, normal S1, S2, no murmurs, rubs, clicks or gallops   Abdomen - soft, nontender, nondistended, no masses or organomegaly  Lymph- no adenopathy palpable  Ext-peripheral pulses normal, no pedal edema, no clubbing or cyanosis  Skin-Warm and dry. no hyperpigmentation, vitiligo, or suspicious lesions  Neuro -alert, oriented, normal speech, no focal findings or movement disorder noted  Neck-normal C-spine, no tenderness, full ROM without pain  Feet-no nail deformities or callus formation with good pulses noted  Rt.shoulder-reduced range of motion of rt., subdeltoid tenderness    Results for orders placed or performed in visit on 08/06/18   AMB POC GLUCOSE BLOOD, BY GLUCOSE MONITORING DEVICE   Result Value Ref Range    Glucose  mg/dL   AMB POC HEMOGLOBIN A1C   Result Value Ref Range    Hemoglobin A1c (POC) 8.9 %   AMB POC LIPID PROFILE   Result Value Ref Range    Cholesterol (POC) 277     Triglycerides (POC) 248     HDL Cholesterol (POC) 44     LDL Cholesterol (POC) 184 MG/DL    Non-HDL Goal (POC) 234     TChol/HDL Ratio (POC) 6.3        Assessment/Plan:    ICD-10-CM ICD-9-CM    1. Type 2 diabetes mellitus with diabetic polyneuropathy, without long-term current use of insulin (HCC) E11.42 250.60 AMB POC GLUCOSE BLOOD, BY GLUCOSE MONITORING DEVICE     357.2 AMB POC HEMOGLOBIN A1C      metFORMIN (GLUCOPHAGE) 1,000 mg tablet   2. Gastroesophageal reflux disease without esophagitis K21.9 530.81    3. Screening for breast cancer Z12.31 V76.10 OCCULT BLOOD IMMUNOASSAY,DIAGNOSTIC      OCCULT BLOOD IMMUNOASSAY,DIAGNOSTIC   4.  Hypercholesterolemia E78.00 272.0 AMB POC LIPID PROFILE     Orders Placed This Encounter    OCCULT BLOOD IMMUNOASSAY,DIAGNOSTIC    OCCULT BLOOD IMMUNOASSAY,DIAGNOSTIC    AMB POC GLUCOSE BLOOD, BY GLUCOSE MONITORING DEVICE    AMB POC HEMOGLOBIN A1C    AMB POC LIPID PROFILE    metFORMIN (GLUCOPHAGE) 1,000 mg tablet Sig: Take 1 Tab by mouth two (2) times a day. Dispense:  60 Tab     Refill:  12    gabapentin (NEURONTIN) 300 mg capsule     Sig: Take 1 Cap by mouth three (3) times daily. Dispense:  90 Cap     Refill:  3     lose weight, increase physical activity, limit alcohol consumption, follow low fat diet, follow low salt diet,Take 81mg aspirin daily  Indications:   Symptomatic relief of pain    Procedure:  After consent was obtained, using sterile technique the right shoulder joint was prepped using alcohol. Local anesthetic used: 1% lidocaine. . The joint was entered and Kenalog 40 mg was mixed with 1% lidocaine 3 ml  and injected into the joint and the needle withdrawn. The procedure was well tolerated. The patient is asked to continue to rest the joint for a few more days before resuming regular activities. It may be more painful for the first 1-2 days. Watch for fever, or increased swelling or persistent pain in the joint. Call or return to clinic prn if such symptoms occur or there is failure to improve as anticipated.       [facility]  OFFICE PROCEDURE PROGRESS NOTE        Chart reviewed for the following:   Brandi Queen MD, have reviewed the History, Physical and updated the Allergic reactions for [address] [place] [address] performed immediately prior to start of procedure:   I, Jo Valenzuela MD, have performed the following reviews on [facility] prior to the start of the procedure:            * Patient was identified by name and date of birth   * Agreement on procedure being performed was verified  * Risks and Benefits explained to the patient  * Procedure site verified and marked as necessary  * Patient was positioned for comfort       Time: 2:45pm      Date of procedure: 8/6/2018    Procedure performed by:  Jo Valenzuela MD    Patient assisted by: self    How tolerated by patient: tolerated the procedure well with no complications    Comments: none                Patient Instructions   MyChart Activation    Thank you for requesting access to [facility]. Please follow the instructions below to securely access and download your online medical record. [facility] allows you to send messages to your doctor, view your test results, renew your prescriptions, schedule appointments, and more. How Do I Sign Up? 1. In your internet browser, go to [email or web address]  2. Click on the First Time User? Click Here link in the Sign In box. You will be redirect to the New Member Sign Up page. 3. Enter your [facility] Access Code exactly as it appears below. You will not need to use this code after youve completed the sign-up process. If you do not sign up before the expiration date, you must request a new code. [facility] Access Code: Activation code not generated  Current [facility] Status: Active (This is the date your [facility] access code will )    4. Enter the last four digits of your Social Security Number (xxxx) and Date of Birth (mm/dd/yyyy) as indicated and click Submit. You will be taken to the next sign-up page. 5. Create a [facility] ID. This will be your [facility] login ID and cannot be changed, so think of one that is secure and easy to remember. 6. Create a [facility] password. You can change your password at any time. 7. Enter your Password Reset Question and Answer. This can be used at a later time if you forget your password. 8. Enter your e-mail address. You will receive e-mail notification when new information is available in [address] 9. Click Sign Up. You can now view and download portions of your medical record. 10. Click the Download Summary menu link to download a portable copy of your medical information. Additional Information    If you have questions, please visit the Frequently Asked Questions section of the [facility] website at [email or web address] Remember, [facility] is NOT to be used for urgent needs.  For medical emergencies, dial 911. Follow-up Disposition:  Return in about 4 weeks (around 9/3/2018), or if symptoms worsen or fail to improve. I have reviewed with the patient details of the assessment and plan and all questions were answered. Relevent patient education was performed. The most recent lab findings were reviewed with the patient. An After Visit Summary was printed and given to the patient.

## 2018-08-06 NOTE — PROGRESS NOTES
1. Have you been to the ER, urgent care clinic since your last visit? Hospitalized since your last visit?no    2. Have you seen or consulted any other health care providers outside of the [address] [place] [address] since your last visit? Include any pap smears or colon screening.  No  PHQ over the last two weeks 8/6/2018   PHQ Not Done -   Little interest or pleasure in doing things Not at all   Feeling down, depressed, irritable, or hopeless Not at all   Total Score PHQ 2 0

## 2018-08-06 NOTE — PATIENT INSTRUCTIONS
[employer] Activation    Thank you for requesting access to "[employer]. Please follow the instructions below to securely access and download your online medical record. "[employer] allows you to send messages to your doctor, view your test results, renew your prescriptions, schedule appointments, and more. How Do I Sign Up? 1. In your internet browser, go to [email or web address]  2. Click on the First Time User? Click Here link in the Sign In box. You will be redirect to the New Member Sign Up page. 3. Enter your "[employer] Access Code exactly as it appears below. You will not need to use this code after youve completed the sign-up process. If you do not sign up before the expiration date, you must request a new code. "[employer] Access Code: Activation code not generated  Current "[employer] Status: Active (This is the date your "[employer] access code will )    4. Enter the last four digits of your Social Security Number (xxxx) and Date of Birth (mm/dd/yyyy) as indicated and click Submit. You will be taken to the next sign-up page. 5. Create a "[employer] ID. This will be your "[employer] login ID and cannot be changed, so think of one that is secure and easy to remember. 6. Create a "[employer] password. You can change your password at any time. 7. Enter your Password Reset Question and Answer. This can be used at a later time if you forget your password. 8. Enter your e-mail address. You will receive e-mail notification when new information is available in [address] 9. Click Sign Up. You can now view and download portions of your medical record. 10. Click the Download Summary menu link to download a portable copy of your medical information. Additional Information    If you have questions, please visit the Frequently Asked Questions section of the "[employer] website at [email or web address] Remember, "[employer] is NOT to be used for urgent needs. For medical emergencies, dial 911. Danger to self

## 2018-09-04 ENCOUNTER — OFFICE VISIT (OUTPATIENT)
Dept: INTERNAL MEDICINE CLINIC | Age: 63
End: 2018-09-04

## 2018-09-04 ENCOUNTER — DOCUMENTATION ONLY (OUTPATIENT)
Dept: INTERNAL MEDICINE CLINIC | Age: 63
End: 2018-09-04

## 2018-09-04 VITALS
SYSTOLIC BLOOD PRESSURE: 124 MMHG | HEIGHT: 61 IN | BODY MASS INDEX: 33.61 KG/M2 | TEMPERATURE: 98 F | RESPIRATION RATE: 16 BRPM | HEART RATE: 82 BPM | OXYGEN SATURATION: 96 % | DIASTOLIC BLOOD PRESSURE: 84 MMHG | WEIGHT: 178 LBS

## 2018-09-04 DIAGNOSIS — E11.9 CONTROLLED TYPE 2 DIABETES MELLITUS WITHOUT COMPLICATION, WITHOUT LONG-TERM CURRENT USE OF INSULIN (HCC): Primary | ICD-10-CM

## 2018-09-04 LAB — GLUCOSE POC: 143 MG/DL

## 2018-09-04 RX ORDER — PREGABALIN 150 MG/1
150 CAPSULE ORAL 2 TIMES DAILY
Qty: 60 CAP | Refills: 6 | Status: SHIPPED | OUTPATIENT
Start: 2018-09-04 | End: 2019-03-04 | Stop reason: SDUPTHER

## 2018-09-04 NOTE — PROGRESS NOTES
Called/ faxed and notified pharmacy for med PA approval for fenofibrate micronized (LOFIBRA) 134 mg capsule. Provider informed.

## 2018-09-04 NOTE — PATIENT INSTRUCTIONS
[employer] Activation    Thank you for requesting access to [employer]. Please follow the instructions below to securely access and download your online medical record. [employer] allows you to send messages to your doctor, view your test results, renew your prescriptions, schedule appointments, and more. How Do I Sign Up? 1. In your internet browser, go to [email or web address]  2. Click on the First Time User? Click Here link in the Sign In box. You will be redirect to the New Member Sign Up page. 3. Enter your [employer] Access Code exactly as it appears below. You will not need to use this code after youve completed the sign-up process. If you do not sign up before the expiration date, you must request a new code. [employer] Access Code: Activation code not generated  Current [employer] Status: Active (This is the date your [employer] access code will )    4. Enter the last four digits of your Social Security Number (xxxx) and Date of Birth (mm/dd/yyyy) as indicated and click Submit. You will be taken to the next sign-up page. 5. Create a [employer] ID. This will be your [employer] login ID and cannot be changed, so think of one that is secure and easy to remember. 6. Create a [employer] password. You can change your password at any time. 7. Enter your Password Reset Question and Answer. This can be used at a later time if you forget your password. 8. Enter your e-mail address. You will receive e-mail notification when new information is available in [address] 9. Click Sign Up. You can now view and download portions of your medical record. 10. Click the Download Summary menu link to download a portable copy of your medical information. Additional Information    If you have questions, please visit the Frequently Asked Questions section of the [employer] website at [email or web address] Remember, [employer] is NOT to be used for urgent needs. For medical emergencies, dial 911.

## 2018-09-04 NOTE — MR AVS SNAPSHOT
Charles Church 
 
 
 [address] [facility],[address] [place] [id number] 
[phone] Patient: Maegan Shane MRN: [id number] EEM:[id number] Visit Information Date & Time Provider Department Dept. Phone Encounter #  
 9/4/2018  1:45 PM Chana Frazier MD [address] [facility] [phone] [id number] Follow-up Instructions Return in about 3 months (around 12/4/2018), or if symptoms worsen or fail to improve. Follow-up and Disposition History Upcoming Health Maintenance Date Due  
 EYE EXAM RETINAL OR DILATED Q1 8/25/1965 Pneumococcal 19-64 Medium Risk (1 of 1 - PPSV23) 8/25/1974 DTaP/Tdap/Td series (1 - Tdap) 8/25/1976 FOBT Q 1 YEAR AGE 50-75 1/13/2017 PAP AKA CERVICAL CYTOLOGY 1/7/2018 BREAST CANCER SCRN MAMMOGRAM 5/26/2018 FOOT EXAM Q1 7/10/2018 Influenza Age 5 to Adult 8/1/2018 MICROALBUMIN Q1 10/10/2018 HEMOGLOBIN A1C Q6M 2/6/2019 LIPID PANEL Q1 8/6/2019 Allergies as of 9/4/2018  Review Complete On: 9/4/2018 By: Lamar Gardner LPN No Known Allergies Current Immunizations  Reviewed on 10/10/2011 Name Date Influenza Vaccine Split 10/10/2011 Not reviewed this visit You Were Diagnosed With   
  
 Codes Comments Controlled type 2 diabetes mellitus without complication, without long-term current use of insulin ([facility]    -  Primary ICD-10-CM: E11.9 ICD-9-CM: 250.00 Vitals BP Pulse Temp Resp Height(growth percentile) Weight(growth percentile) 124/84 82 98 °F (36.7 °C) (Oral) 16 5' 1\" (1.549 m) 178 lb (80.7 kg) SpO2 BMI OB Status Smoking Status 96% 33.63 kg/m2 Postmenopausal Never Smoker Vitals History BMI and BSA Data Body Mass Index Body Surface Area  
 33.63 kg/m 2 1.86 m 2 Preferred Pharmacy Pharmacy Name Phone [id number] [address] [phone] Your Updated Medication List  
  
 This list is accurate as of 9/4/18  2:29 PM.  Always use your most recent med list.  
  
  
  
  
 aspirin 81 mg tablet Take  by mouth. celecoxib 200 mg capsule Commonly known as:  CELEBREX Take 1 Cap by mouth daily. cetirizine 10 mg tablet Commonly known as:  ZYRTEC Take 1 Tab by mouth daily. diphenoxylate-atropine 2.5-0.025 mg per tablet Commonly known as:  LOMOTIL Take 1 tablet by mouth four (4) times daily as needed for Diarrhea.  
  
 esomeprazole 40 mg capsule Commonly known as:  NexIUM  
TAKE ONE (1) CAPSULE TWICE DAILY. fenofibrate micronized 134 mg capsule Commonly known as:  LOFIBRA TAKE ONE CAPSULE BY MOUTH EVERY DAY  
  
 * fluticasone 50 mcg/actuation nasal spray Commonly known as:  Madera Peel 2 Sprays by Both Nostrils route daily. * fluticasone 50 mcg/actuation nasal spray Commonly known as:  Madera Peel 2 Sprays by Both Nostrils route daily. ipratropium 42 mcg (0.06 %) nasal spray Commonly known as:  ATROVENT  
2 Sprays by Both Nostrils route four (4) times daily. irbesartan 150 mg tablet Commonly known as:  AVAPRO TAKE ONE TABLET EVERY NIGHT AT BEDTIME. LORazepam 1 mg tablet Commonly known as:  ATIVAN Take 1 Tab by mouth every four (4) hours as needed for Anxiety. Max Daily Amount: 6 mg.  
  
 losartan 25 mg tablet Commonly known as:  COZAAR Take 1 Tab by mouth daily. meloxicam 15 mg tablet Commonly known as:  MOBIC Take 1 Tab by mouth daily. metFORMIN 1,000 mg tablet Commonly known as:  GLUCOPHAGE Take 1 Tab by mouth two (2) times a day. pantoprazole 40 mg tablet Commonly known as:  PROTONIX  
TAKE ONE TABLET BY MOUTH TWICE A DAY  
  
 pregabalin 150 mg capsule Commonly known as:  Larey Charles Take 1 Cap by mouth two (2) times a day. Max Daily Amount: 300 mg. SITagliptin 100 mg tablet Commonly known as:  Josi Nicolasa TAKE 1 TABLET EVERY DAY. * Notice: This list has 2 medication(s) that are the same as other medications prescribed for you. Read the directions carefully, and ask your doctor or other care provider to review them with you. Prescriptions Printed Refills  
 pregabalin (LYRICA) 150 mg capsule 6 Sig: Take 1 Cap by mouth two (2) times a day. Max Daily Amount: 300 mg. Class: Print Route: Oral  
  
We Performed the Following AMB POC GLUCOSE BLOOD, BY GLUCOSE MONITORING DEVICE [27882 CPT(R)] Follow-up Instructions Return in about 3 months (around 2018), or if symptoms worsen or fail to improve. Patient Instructions Senichart Activation Thank you for requesting access to Education Everytime. Please follow the instructions below to securely access and download your online medical record. Education Everytime allows you to send messages to your doctor, view your test results, renew your prescriptions, schedule appointments, and more. How Do I Sign Up? 1. In your internet browser, go to [email or web address] 
2. Click on the First Time User? Click Here link in the Sign In box. You will be redirect to the New Member Sign Up page. 3. Enter your Education Everytime Access Code exactly as it appears below. You will not need to use this code after youve completed the sign-up process. If you do not sign up before the expiration date, you must request a new code. Education Everytime Access Code: Activation code not generated Current Education Everytime Status: Active (This is the date your Education Everytime access code will ) 4. Enter the last four digits of your Social Security Number (xxxx) and Date of Birth (mm/dd/yyyy) as indicated and click Submit. You will be taken to the next sign-up page. 5. Create a Education Everytime ID. This will be your Education Everytime login ID and cannot be changed, so think of one that is secure and easy to remember. 6. Create a Education Everytime password. You can change your password at any time. 7. Enter your Password Reset Question and Answer. This can be used at a later time if you forget your password. 8. Enter your e-mail address. You will receive e-mail notification when new information is available in [address] 9. Click Sign Up. You can now view and download portions of your medical record. 10. Click the Download Summary menu link to download a portable copy of your medical information. Additional Information If you have questions, please visit the Frequently Asked Questions section of the Cash Check Card website at [email or web address] Remember, Cash Check Card is NOT to be used for urgent needs. For medical emergencies, dial 911. Introducing [facility]! Dear Chester Gray: Thank you for requesting a Cash Check Card account. Our records indicate that you already have an active Cash Check Card account. You can access your account anytime at [email or web address] Did you know that you can access your hospital and ER discharge instructions at any time in Cash Check Card? You can also review all of your test results from your hospital stay or ER visit. Additional Information If you have questions, please visit the Frequently Asked Questions section of the Cash Check Card website at [email or web address] Remember, Cash Check Card is NOT to be used for urgent needs. For medical emergencies, dial 911. Now available from your iPhone and Android! Please provide this summary of care documentation to your next provider. Your primary care clinician is listed as Tye Camara. If you have any questions after today's visit, please call [phone].

## 2018-09-04 NOTE — PROGRESS NOTES
1. Have you been to the ER, urgent care clinic since your last visit? Hospitalized since your last visit?no    2. Have you seen or consulted any other health care providers outside of the [address] [place] [address] since your last visit? Include any pap smears or colon screening. No    PHQ over the last two weeks 8/6/2018   PHQ Not Done Medical Reason (indicate in comments)   Little interest or pleasure in doing things Not at all   Feeling down, depressed, irritable, or hopeless Not at all   Total Score PHQ 2 0      Kurtis Moya is a 61 y.o. female and presents with Diabetes  . Subjective:  She states she has had difficulty with her neurontin causing dizziness and headaches. she is not tolerating it well. Diabetes Mellitus Review:  She has diabetes mellitus. Diabetic ROS - medication compliance: compliant all of the time, diabetic diet compliance: compliant all of the time, home glucose monitoring: is performed. Known diabetic complications: neuropathy in feet  Cardiovascular risk factors: family history, dyslipidemia, diabetes mellitus, obesity, hypertension  Current diabetic medications include oral agents/   Eye exam current (within one year): no  Weight trend: stable  Prior visit with dietician: no  Current diet: \"healthy\" diet  in general  Current exercise: walking  Current monitoring regimen: home blood tests - daily  Home blood sugar records: trend: stable  Any episodes of hypoglycemia? no  Is She on ACE inhibitor or angiotensin II receptor blocker? Yes       Hypertension Review:  The patient has essential hypertension  Diet and Lifestyle: generally follows a  low sodium diet, exercises sporadically  Home BP Monitoring: is not measured at home. Pertinent ROS: taking medications as instructed, no medication side effects noted, no TIA's, no chest pain on exertion, no dyspnea on exertion, no swelling of ankles.          Review of Systems  Constitutional: negative for fevers, chills, anorexia and weight loss  Eyes:   negative for visual disturbance and irritation  ENT:   negative for tinnitus,sore throat,nasal congestion,ear pains. hoarseness  Respiratory:  negative for cough, hemoptysis, dyspnea,wheezing  CV:   negative for chest pain, palpitations, lower extremity edema  GI:   negative for nausea, vomiting, diarrhea, abdominal pain,melena  Endo:               negative for polyuria,polydipsia,polyphagia,heat intolerance  Genitourinary: negative for frequency, dysuria and hematuria  Integument:  negative for rash and pruritus  Hematologic:  negative for easy bruising and gum/nose bleeding  Musculoskel:  joint pain  Neurological:  negative forvertigo, memory problems and gait   Behavl/Psych: negative for feelings of anxiety, depression, mood changes    Past Medical History:   Diagnosis Date    Acute cholecystitis 2/9/2011    Acute cystitis 2/9/2011    Cholecystitis 2/9/2011    Cholelithiasis 2/9/2011    Chronic Pain 2/9/2011    Essential hypertension, benign 2/9/2011    GERD (gastroesophageal reflux disease)     GERD, Gastro- Esophageal Reflux Diease (acid reflux) 2/9/2011    Herniated nucleus pulposus 2/9/2011    Hypercholesteremia 2/9/2011    Hypercholesteremia 2/9/2011    Hypertension     Neurogenic bladder, NOS 2/9/2011     Past Surgical History:   Procedure Laterality Date    HX GYN      HX HAMMER TOE REPAIR  6/21/2016    left foot      Social History     Social History    Marital status:      Spouse name: N/A    Number of children: N/A    Years of education: N/A     Social History Main Topics    Smoking status: Never Smoker    Smokeless tobacco: Never Used    Alcohol use No    Drug use: No    Sexual activity: Yes     Partners: Male     Other Topics Concern    None     Social History Narrative     Family History   Problem Relation Age of Onset    Heart Disease Mother     Cancer Father      Current Outpatient Prescriptions   Medication Sig Dispense Refill    pregabalin (Camila Moreira) 150 mg capsule Take 1 Cap by mouth two (2) times a day. Max Daily Amount: 300 mg. 60 Cap 6    metFORMIN (GLUCOPHAGE) 1,000 mg tablet Take 1 Tab by mouth two (2) times a day. 60 Tab 12    fenofibrate micronized (LOFIBRA) 134 mg capsule TAKE ONE CAPSULE BY MOUTH EVERY DAY 30 Cap 6    SITagliptin (JANUVIA) 100 mg tablet TAKE 1 TABLET EVERY DAY. 30 Tab 12    celecoxib (CELEBREX) 200 mg capsule Take 1 Cap by mouth daily. 30 Cap 2    pantoprazole (PROTONIX) 40 mg tablet TAKE ONE TABLET BY MOUTH TWICE A DAY 60 Tab 12    meloxicam (MOBIC) 15 mg tablet Take 1 Tab by mouth daily. 30 Tab 12    fluticasone (FLONASE) 50 mcg/actuation nasal spray 2 Sprays by Both Nostrils route daily. 1 Bottle 12    losartan (COZAAR) 25 mg tablet Take 1 Tab by mouth daily. 90 Tab 3    cetirizine (ZYRTEC) 10 mg tablet Take 1 Tab by mouth daily. 30 Tab 3    irbesartan (AVAPRO) 150 mg tablet TAKE ONE TABLET EVERY NIGHT AT BEDTIME. 30 Tab 12    fluticasone (FLONASE) 50 mcg/actuation nasal spray 2 Sprays by Both Nostrils route daily. 1 Bottle 12    ipratropium (ATROVENT) 0.06 % nasal spray 2 Sprays by Both Nostrils route four (4) times daily. 15 mL 0    aspirin 81 mg tablet Take  by mouth.  esomeprazole (NEXIUM) 40 mg capsule TAKE ONE (1) CAPSULE TWICE DAILY. 60 Cap 12    LORazepam (ATIVAN) 1 mg tablet Take 1 Tab by mouth every four (4) hours as needed for Anxiety. Max Daily Amount: 6 mg. 4 Tab 0    diphenoxylate-atropine (LOMOTIL) 2.5-0.025 mg per tablet Take 1 tablet by mouth four (4) times daily as needed for Diarrhea.  60 tablet 0     No Known Allergies    Objective:  Visit Vitals    /84    Pulse 82    Temp 98 °F (36.7 °C) (Oral)    Resp 16    Ht 5' 1\" (1.549 m)    Wt 178 lb (80.7 kg)    SpO2 96%    BMI 33.63 kg/m2     Physical Exam:   General appearance - alert, well appearing, and in no distress  Mental status - alert, oriented to person, place, and time  EYE-LAURI, EOMI, corneas normal, no foreign bodies  ENT-ENT exam normal, no neck nodes or sinus tenderness  Nose - normal and patent, no erythema, discharge or polyps  Mouth - mucous membranes moist, pharynx normal without lesions  Neck - supple, no significant adenopathy   Chest - clear to auscultation, no wheezes, rales or rhonchi, symmetric air entry   Heart - normal rate, regular rhythm, normal S1, S2, no murmurs, rubs, clicks or gallops   Abdomen - soft, nontender, nondistended, no masses or organomegaly  Lymph- no adenopathy palpable  Ext-peripheral pulses normal, no pedal edema, no clubbing or cyanosis  Skin-Warm and dry. no hyperpigmentation, vitiligo, or suspicious lesions  Neuro -alert, oriented, normal speech, no focal findings or movement disorder noted  Neck-normal C-spine, no tenderness, full ROM without pain  Feet-no nail deformities or callus formation with good pulses noted  Rt.shoulder-reduced range of motion of rt., subdeltoid tenderness    Results for orders placed or performed in visit on 09/04/18   AMB POC GLUCOSE BLOOD, BY GLUCOSE MONITORING DEVICE   Result Value Ref Range    Glucose  mg/dL       Assessment/Plan:    ICD-10-CM ICD-9-CM    1. Controlled type 2 diabetes mellitus without complication, without long-term current use of insulin ([facility]) E11.9 250.00 AMB POC GLUCOSE BLOOD, BY GLUCOSE MONITORING DEVICE      pregabalin (LYRICA) 150 mg capsule     Orders Placed This Encounter    AMB POC GLUCOSE BLOOD, BY GLUCOSE MONITORING DEVICE    pregabalin (LYRICA) 150 mg capsule     Sig: Take 1 Cap by mouth two (2) times a day. Max Daily Amount: 300 mg. Dispense:  60 Cap     Refill:  6     lose weight, increase physical activity, limit alcohol consumption, follow low fat diet, follow low salt diet,Take 81mg aspirin daily  Patient Instructions   J C Lads Activation    Thank you for requesting access to J C Lads. Please follow the instructions below to securely access and download your online medical record.  J C Lads allows you to send messages to your doctor, view your test results, renew your prescriptions, schedule appointments, and more. How Do I Sign Up? 1. In your internet browser, go to [email or web address]  2. Click on the First Time User? Click Here link in the Sign In box. You will be redirect to the New Member Sign Up page. 3. Enter your Sellsy Access Code exactly as it appears below. You will not need to use this code after youve completed the sign-up process. If you do not sign up before the expiration date, you must request a new code. MyCContent Circlest Access Code: Activation code not generated  Current Sellsy Status: Active (This is the date your Pink Rebel Shoest access code will )    4. Enter the last four digits of your Social Security Number (xxxx) and Date of Birth (mm/dd/yyyy) as indicated and click Submit. You will be taken to the next sign-up page. 5. Create a Sellsy ID. This will be your Sellsy login ID and cannot be changed, so think of one that is secure and easy to remember. 6. Create a Sellsy password. You can change your password at any time. 7. Enter your Password Reset Question and Answer. This can be used at a later time if you forget your password. 8. Enter your e-mail address. You will receive e-mail notification when new information is available in [address] 9. Click Sign Up. You can now view and download portions of your medical record. 10. Click the Download Summary menu link to download a portable copy of your medical information. Additional Information    If you have questions, please visit the Frequently Asked Questions section of the Sellsy website at [email or web address] Remember, Sellsy is NOT to be used for urgent needs. For medical emergencies, dial 911. Follow-up Disposition:  Return in about 3 months (around 2018), or if symptoms worsen or fail to improve.       I have reviewed with the patient details of the assessment and plan and all questions were answered. Relevent patient education was performed. The most recent lab findings were reviewed with the patient. An After Visit Summary was printed and given to the patient.

## 2018-09-05 ENCOUNTER — DOCUMENTATION ONLY (OUTPATIENT)
Dept: INTERNAL MEDICINE CLINIC | Age: 63
End: 2018-09-05

## 2018-09-05 RX ORDER — FENOFIBRATE 134 MG/1
CAPSULE ORAL
Qty: 30 CAP | Refills: 6 | Status: SHIPPED | OUTPATIENT
Start: 2018-09-05 | End: 2019-09-18 | Stop reason: SDUPTHER

## 2018-09-10 ENCOUNTER — DOCUMENTATION ONLY (OUTPATIENT)
Dept: INTERNAL MEDICINE CLINIC | Age: 63
End: 2018-09-10

## 2018-09-20 RX ORDER — CELECOXIB 200 MG/1
CAPSULE ORAL
Qty: 30 CAP | Refills: 0 | Status: SHIPPED | OUTPATIENT
Start: 2018-09-20 | End: 2018-11-06 | Stop reason: SDUPTHER

## 2018-10-03 ENCOUNTER — TELEPHONE (OUTPATIENT)
Dept: INTERNAL MEDICINE CLINIC | Age: 63
End: 2018-10-03

## 2018-10-03 NOTE — TELEPHONE ENCOUNTER
Spoke with patient on response to Prescription for Celebrex. Told patient we have e no information and to ask pharmacy to send us whatever they need to.

## 2018-10-05 ENCOUNTER — TELEPHONE (OUTPATIENT)
Dept: INTERNAL MEDICINE CLINIC | Age: 63
End: 2018-10-05

## 2018-10-10 ENCOUNTER — TELEPHONE (OUTPATIENT)
Dept: INTERNAL MEDICINE CLINIC | Age: 63
End: 2018-10-10

## 2018-10-10 NOTE — TELEPHONE ENCOUNTER
Call and informed pharmacy of med PA approval they stated that they will have to order the medication and will have it ready by tomorrow for the pt to pickup. Left message for pt informing her that medication has been approved and that it will be ready for  tomorrow.

## 2018-10-10 NOTE — TELEPHONE ENCOUNTER
Authorization number # [id number]    for 200 mg celebrex   Valid from 10/10/18 to 10/10/19 [facility] , [address] PLUS

## 2018-11-07 RX ORDER — CELECOXIB 200 MG/1
CAPSULE ORAL
Qty: 30 CAP | Refills: 0 | Status: SHIPPED | OUTPATIENT
Start: 2018-11-07 | End: 2018-12-29 | Stop reason: SDUPTHER

## 2018-11-09 DIAGNOSIS — E11.9 CONTROLLED TYPE 2 DIABETES MELLITUS WITHOUT COMPLICATION, WITHOUT LONG-TERM CURRENT USE OF INSULIN (HCC): ICD-10-CM

## 2018-11-09 DIAGNOSIS — E78.00 HYPERCHOLESTEREMIA: ICD-10-CM

## 2018-11-09 DIAGNOSIS — I10 ESSENTIAL HYPERTENSION, BENIGN: ICD-10-CM

## 2018-11-12 RX ORDER — LOSARTAN POTASSIUM 25 MG/1
TABLET ORAL
Qty: 90 TAB | Refills: 0 | Status: SHIPPED | OUTPATIENT
Start: 2018-11-12 | End: 2019-02-25 | Stop reason: SDUPTHER

## 2018-12-04 ENCOUNTER — OFFICE VISIT (OUTPATIENT)
Dept: INTERNAL MEDICINE CLINIC | Age: 63
End: 2018-12-04

## 2018-12-04 VITALS — WEIGHT: 178.5 LBS | HEIGHT: 61 IN | BODY MASS INDEX: 33.7 KG/M2

## 2018-12-04 DIAGNOSIS — R73.9 ELEVATED BLOOD SUGAR: ICD-10-CM

## 2018-12-04 DIAGNOSIS — M54.16 LUMBAR RADICULOPATHY, ACUTE: ICD-10-CM

## 2018-12-04 DIAGNOSIS — M12.811 ROTATOR CUFF TEAR ARTHROPATHY OF RIGHT SHOULDER: ICD-10-CM

## 2018-12-04 DIAGNOSIS — E11.42 TYPE 2 DIABETES MELLITUS WITH DIABETIC POLYNEUROPATHY, WITHOUT LONG-TERM CURRENT USE OF INSULIN (HCC): ICD-10-CM

## 2018-12-04 DIAGNOSIS — M53.9 MULTILEVEL DEGENERATIVE DISC DISEASE: Primary | ICD-10-CM

## 2018-12-04 DIAGNOSIS — E78.00 HYPERCHOLESTEREMIA: ICD-10-CM

## 2018-12-04 DIAGNOSIS — M75.101 ROTATOR CUFF TEAR ARTHROPATHY OF RIGHT SHOULDER: ICD-10-CM

## 2018-12-04 DIAGNOSIS — I10 ESSENTIAL HYPERTENSION, BENIGN: ICD-10-CM

## 2018-12-04 LAB
CHOLEST SERPL-MCNC: 243 MG/DL
GLUCOSE POC: 187 MG/DL
HBA1C MFR BLD HPLC: 8.1 %
HDLC SERPL-MCNC: 51 MG/DL
LDL CHOLESTEROL POC: 159 MG/DL
TCHOL/HDL RATIO (POC): 4.8
TRIGL SERPL-MCNC: 164 MG/DL
VLDLC SERPL CALC-MCNC: 192 MG/DL

## 2018-12-04 RX ORDER — LIDOCAINE HYDROCHLORIDE 20 MG/ML
1 INJECTION, SOLUTION EPIDURAL; INFILTRATION; INTRACAUDAL; PERINEURAL ONCE
Qty: 1 ML | Refills: 0
Start: 2018-12-04 | End: 2018-12-04

## 2018-12-04 RX ORDER — TRIAMCINOLONE ACETONIDE 40 MG/ML
40 INJECTION, SUSPENSION INTRA-ARTICULAR; INTRAMUSCULAR ONCE
Qty: 1 ML | Refills: 0
Start: 2018-12-04 | End: 2018-12-04

## 2018-12-04 NOTE — PROGRESS NOTES
Chief Complaint   Patient presents with    Cholesterol Problem    Diabetes    Hypertension     1. Have you been to the ER, urgent care clinic since your last visit? Hospitalized since your last visit? No    2. Have you seen or consulted any other health care providers outside of the [address] [place] [address] since your last visit? Include any pap smears or colon screening.  Yes Reason for visit: Neurology

## 2018-12-04 NOTE — PROGRESS NOTES
Fady Prado is a 61 y.o. female and presents with Cholesterol Problem; Diabetes; and Hypertension  . Subjective:  Back Pain Review:  Patient presents for evaluation of low back problems. Symptoms have been present for months and include pain in lower back (dull, mild in character; 10/10 in severity). Initial inciting event: unknown. Symptoms are worst: at times. Alleviating factors identifiable by patient are lying flat, medication . Exacerbating factors identifiable by patient are bending forwards, bending backwards. Treatments so far initiated by patient: medication Previous lower back problems: reported. Previous workup: mri. She has an intermittent pain radiating down the rt.lower leg./  She has had pains on walking in her legs. Hypertension Review:  The patient has hypertension . Diet and Lifestyle: generally follows a low sodium diet, exercises sporadically  Home BP Monitoring: is not measured at home. Pertinent ROS: taking medications as instructed, no medication side effects noted, no TIA's, no chest pain on exertion, no dyspnea on exertion, no swelling of ankles. Dyslipidemia Review:  Patient presents for evaluation of lipids. Compliance with treatment thus far has been excellent. A repeat fasting lipid profile was done. The patient does not use medications that may worsen dyslipidemias . The patient exercises sporadically. Diabetes Mellitus Review:  She has diabetes mellitus. Diabetic ROS - medication compliance: compliant all of the time, diabetic diet compliance: compliant all of the time, home glucose monitoring: is performed.    Known diabetic complications: none  Cardiovascular risk factors: family history, dyslipidemia, diabetes mellitus, obesity, hypertension  Current diabetic medications include oral agents/insulin   Eye exam current (within one year): no  Weight trend: stable  Prior visit with dietician: no  Current diet: \"healthy\" diet  in general  Current exercise: walking  Current monitoring regimen: home blood tests - daily  Home blood sugar records: trend: stable  Any episodes of hypoglycemia? no  Is She on ACE inhibitor or angiotensin II receptor blocker? Yes   Lab review: orders written for new lab studies as appropriate; see orders. Shoulder Pain Review:  Patient complains of right side shoulder pain. The symptoms began several weeks ago Course of symptoms since onset has been gradually worsening. Pain is described as overall severity = moderate. Symptoms were incited by no known event. Patient denies N/A. Therapy to date includes OTC analgesics: effective. Review of Systems  Constitutional: negative for fevers, chills, anorexia and weight loss  Eyes:   negative for visual disturbance and irritation  ENT:   negative for tinnitus,sore throat,nasal congestion,ear pains. hoarseness  Respiratory:  negative for cough, hemoptysis, dyspnea,wheezing  CV:   negative for chest pain, palpitations, lower extremity edema  GI:   negative for nausea, vomiting, diarrhea, abdominal pain,melena  Endo:               negative for polyuria,polydipsia,polyphagia,heat intolerance  Genitourinary: negative for frequency, dysuria and hematuria  Integument:  negative for rash and pruritus  Hematologic:  negative for easy bruising and gum/nose bleeding  Musculoskel: negative for myalgias, arthralgias, back pain, muscle weakness, joint pain  Neurological:  negative for headaches, dizziness, vertigo, memory problems and gait   Behavl/Psych: negative for feelings of anxiety, depression, mood changes    Past Medical History:   Diagnosis Date    Acute cholecystitis 2/9/2011    Acute cystitis 2/9/2011    Cholecystitis 2/9/2011    Cholelithiasis 2/9/2011    Chronic Pain 2/9/2011    Essential hypertension, benign 2/9/2011    GERD (gastroesophageal reflux disease)     GERD, Gastro- Esophageal Reflux Diease (acid reflux) 2/9/2011    Herniated nucleus pulposus 2/9/2011    Hypercholesteremia 2/9/2011    Hypercholesteremia 2/9/2011    Hypertension     Neurogenic bladder, NOS 2/9/2011     Past Surgical History:   Procedure Laterality Date    HX GYN      HX HAMMER TOE REPAIR  6/21/2016    left foot      Social History     Socioeconomic History    Marital status:      Spouse name: Not on file    Number of children: Not on file    Years of education: Not on file    Highest education level: Not on file   Tobacco Use    Smoking status: Never Smoker    Smokeless tobacco: Never Used   Substance and Sexual Activity    Alcohol use: No    Drug use: No    Sexual activity: Yes     Partners: Male     Family History   Problem Relation Age of Onset    Heart Disease Mother     Cancer Father      Current Outpatient Medications   Medication Sig Dispense Refill    losartan (COZAAR) 25 mg tablet TAKE 1 TABLET BY MOUTH EVERY DAY 90 Tab 0    celecoxib (CELEBREX) 200 mg capsule TAKE 1 CAPSULE BY MOUTH EVERY DAY 30 Cap 0    fenofibrate micronized (LOFIBRA) 134 mg capsule TAKE ONE CAPSULE BY MOUTH EVERY DAY 30 Cap 6    pregabalin (LYRICA) 150 mg capsule Take 1 Cap by mouth two (2) times a day. Max Daily Amount: 300 mg. 60 Cap 6    metFORMIN (GLUCOPHAGE) 1,000 mg tablet Take 1 Tab by mouth two (2) times a day. 60 Tab 12    SITagliptin (JANUVIA) 100 mg tablet TAKE 1 TABLET EVERY DAY. 30 Tab 12    pantoprazole (PROTONIX) 40 mg tablet TAKE ONE TABLET BY MOUTH TWICE A DAY 60 Tab 12    meloxicam (MOBIC) 15 mg tablet Take 1 Tab by mouth daily. 30 Tab 12    cetirizine (ZYRTEC) 10 mg tablet Take 1 Tab by mouth daily. 30 Tab 3    aspirin 81 mg tablet Take  by mouth.  fluticasone (FLONASE) 50 mcg/actuation nasal spray 2 Sprays by Both Nostrils route daily. 1 Bottle 12    irbesartan (AVAPRO) 150 mg tablet TAKE ONE TABLET EVERY NIGHT AT BEDTIME. 30 Tab 12    fluticasone (FLONASE) 50 mcg/actuation nasal spray 2 Sprays by Both Nostrils route daily.  1 Bottle 12    ipratropium (ATROVENT) 0.06 % nasal spray 2 Sprays by Both Nostrils route four (4) times daily. 15 mL 0    esomeprazole (NEXIUM) 40 mg capsule TAKE ONE (1) CAPSULE TWICE DAILY. 60 Cap 12    LORazepam (ATIVAN) 1 mg tablet Take 1 Tab by mouth every four (4) hours as needed for Anxiety. Max Daily Amount: 6 mg. 4 Tab 0    diphenoxylate-atropine (LOMOTIL) 2.5-0.025 mg per tablet Take 1 tablet by mouth four (4) times daily as needed for Diarrhea. 60 tablet 0     No Known Allergies    Objective:  Visit Vitals  BP (P) 118/68   Pulse (P) 78   Temp (P) 98.3 °F (36.8 °C) (Oral)   Resp (P) 16   Ht 5' 1\" (1.549 m)   Wt 178 lb 8 oz (81 kg)   SpO2 (P) 97%   BMI 33.73 kg/m²     Physical Exam:   General appearance - alert, well appearing, and in no distress  Mental status - alert, oriented to person, place, and time  EYE-LAURI, EOMI, corneas normal, no foreign bodies  ENT-ENT exam normal, no neck nodes or sinus tenderness  Nose - normal and patent, no erythema, discharge or polyps  Mouth - mucous membranes moist, pharynx normal without lesions  Neck - supple, no significant adenopathy   Chest - clear to auscultation, no wheezes, rales or rhonchi, symmetric air entry   Heart - normal rate, regular rhythm, normal S1, S2, no murmurs, rubs, clicks or gallops   Abdomen - soft, nontender, nondistended, no masses or organomegaly  Lymph- no adenopathy palpable  Ext-peripheral pulses normal, no pedal edema, no clubbing or cyanosis  Skin-Warm and dry.  no hyperpigmentation, vitiligo, or suspicious lesions  Neuro -alert, oriented, normal speech, no focal findings or movement disorder noted  Neck-normal C-spine, no tenderness, full ROM without pain  Feet-no nail deformities or callus formation with good pulses noted      Results for orders placed or performed in visit on 12/04/18   AMB POC LIPID PROFILE   Result Value Ref Range    Cholesterol (POC) 243     Triglycerides (POC) 164     HDL Cholesterol (POC) 51     VLDL (POC) 192 MG/DL    LDL Cholesterol (POC) 159 MG/DL TChol/HDL Ratio (POC) 4.8    AMB POC HEMOGLOBIN A1C   Result Value Ref Range    Hemoglobin A1c (POC) 8.1 %   AMB POC GLUCOSE BLOOD, BY GLUCOSE MONITORING DEVICE   Result Value Ref Range    Glucose  mg/dL       Assessment/Plan:    ICD-10-CM ICD-9-CM    1. Multilevel degenerative disc disease M53.9 722.6    2. Hypercholesteremia E78.00 272.0 AMB POC LIPID PROFILE   3. Essential hypertension, benign I10 401.1 AMB POC HEMOGLOBIN A1C      AMB POC GLUCOSE BLOOD, BY GLUCOSE MONITORING DEVICE   4. Elevated blood sugar R73.9 790.29 AMB POC LIPID PROFILE   5. Lumbar radiculopathy, acute M54.16 724.4    6. Type 2 diabetes mellitus with diabetic polyneuropathy, without long-term current use of insulin ([facility]) E11.42 250.60      357.2    7. Rotator cuff tear arthropathy of right shoulder M75.101 716.81 [place] DRAIN/INJECT LARGE JOINT/BURSA    M12.811       Orders Placed This Encounter    AMB POC LIPID PROFILE    AMB POC HEMOGLOBIN A1C    AMB POC GLUCOSE BLOOD, BY GLUCOSE MONITORING DEVICE    [place] DRAIN/INJECT LARGE JOINT/BURSA     lose weight, increase physical activity, continue present diet with no restrictions, follow low fat diet, follow low salt diet,Take 81mg aspirin daily    Indications:   Symptomatic relief of pain    Procedure:  After consent was obtained, using sterile technique the right shoulder joint was prepped using alcohol. Local anesthetic used: 1% lidocaine. . The joint was entered and Kenalog 40 mg was mixed with 1% lidocaine 3 ml  and injected into the joint and the needle withdrawn. The procedure was well tolerated. The patient is asked to continue to rest the joint for a few more days before resuming regular activities. It may be more painful for the first 1-2 days. Watch for fever, or increased swelling or persistent pain in the joint. Call or return to clinic prn if such symptoms occur or there is failure to improve as anticipated.       [facility]  OFFICE PROCEDURE PROGRESS NOTE        Chart reviewed for the following:   Airam Ricardo MD, have reviewed the History, Physical and updated the Allergic reactions for [address] performed immediately prior to start of procedure:   I, Shandra Thomas MD, have performed the following reviews on Lamont Smalls prior to the start of the procedure:            * Patient was identified by name and date of birth   * Agreement on procedure being performed was verified  * Risks and Benefits explained to the patient  * Procedure site verified and marked as necessary  * Patient was positioned for comfort       Time: 2:55pm      Date of procedure: 2018    Procedure performed by:  Shandra Thomas MD    Patient assisted by: nursing attendant    How tolerated by patient: tolerated the procedure well with no complications    Comments: none        Patient Instructions   MyChart Activation    Thank you for requesting access to [email or web address] Please follow the instructions below to securely access and download your online medical record. [email or web address] allows you to send messages to your doctor, view your test results, renew your prescriptions, schedule appointments, and more. How Do I Sign Up? 1. In your internet browser, go to [email or web address]  2. Click on the First Time User? Click Here link in the Sign In box. You will be redirect to the New Member Sign Up page. 3. Enter your [employer] Access Code exactly as it appears below. You will not need to use this code after youve completed the sign-up process. If you do not sign up before the expiration date, you must request a new code. [employer] Access Code: Activation code not generated  Current [email or web address] Status: Active (This is the date your [email or web address] access code will )    4. Enter the last four digits of your Social Security Number (xxxx) and Date of Birth (mm/dd/yyyy) as indicated and click Submit. You will be taken to the next sign-up page. 5. Create a [email or web address] ID. This will be your [employer] login ID and cannot be changed, so think of one that is secure and easy to remember. 6. Create a [employer] password. You can change your password at any time. 7. Enter your Password Reset Question and Answer. This can be used at a later time if you forget your password. 8. Enter your e-mail address. You will receive e-mail notification when new information is available in [address] 9. Click Sign Up. You can now view and download portions of your medical record. 10. Click the Download Summary menu link to download a portable copy of your medical information. Additional Information    If you have questions, please visit the Frequently Asked Questions section of the [employer] website at [email or web address] Remember, [employer] is NOT to be used for urgent needs. For medical emergencies, dial 911. Follow-up Disposition:  Return in about 3 months (around 3/4/2019), or if symptoms worsen or fail to improve. I have reviewed with the patient details of the assessment and plan and all questions were answered. Relevent patient education was performed. The most recent lab findings were reviewed with the patient. An After Visit Summary was printed and given to the patient.

## 2018-12-04 NOTE — PATIENT INSTRUCTIONS
[employer] Activation    Thank you for requesting access to [employer]. Please follow the instructions below to securely access and download your online medical record. [employer] allows you to send messages to your doctor, view your test results, renew your prescriptions, schedule appointments, and more. How Do I Sign Up? 1. In your internet browser, go to [email or web address]  2. Click on the First Time User? Click Here link in the Sign In box. You will be redirect to the New Member Sign Up page. 3. Enter your [employer] Access Code exactly as it appears below. You will not need to use this code after youve completed the sign-up process. If you do not sign up before the expiration date, you must request a new code. [employer] Access Code: Activation code not generated  Current [employer] Status: Active (This is the date your [employer] access code will )    4. Enter the last four digits of your Social Security Number (xxxx) and Date of Birth (mm/dd/yyyy) as indicated and click Submit. You will be taken to the next sign-up page. 5. Create a [employer] ID. This will be your [employer] login ID and cannot be changed, so think of one that is secure and easy to remember. 6. Create a [employer] password. You can change your password at any time. 7. Enter your Password Reset Question and Answer. This can be used at a later time if you forget your password. 8. Enter your e-mail address. You will receive e-mail notification when new information is available in [address] 9. Click Sign Up. You can now view and download portions of your medical record. 10. Click the Download Summary menu link to download a portable copy of your medical information. Additional Information    If you have questions, please visit the Frequently Asked Questions section of the [employer] website at [email or web address] Remember, [employer] is NOT to be used for urgent needs. For medical emergencies, dial 911.

## 2018-12-31 RX ORDER — CELECOXIB 200 MG/1
CAPSULE ORAL
Qty: 30 CAP | Refills: 1 | Status: SHIPPED | OUTPATIENT
Start: 2018-12-31 | End: 2019-03-17 | Stop reason: SDUPTHER

## 2019-02-25 DIAGNOSIS — E78.00 HYPERCHOLESTEREMIA: ICD-10-CM

## 2019-02-25 DIAGNOSIS — I10 ESSENTIAL HYPERTENSION, BENIGN: ICD-10-CM

## 2019-02-25 DIAGNOSIS — E11.9 CONTROLLED TYPE 2 DIABETES MELLITUS WITHOUT COMPLICATION, WITHOUT LONG-TERM CURRENT USE OF INSULIN (HCC): ICD-10-CM

## 2019-02-25 RX ORDER — LOSARTAN POTASSIUM 25 MG/1
TABLET ORAL
Qty: 90 TAB | Refills: 0 | Status: SHIPPED | OUTPATIENT
Start: 2019-02-25 | End: 2019-05-29 | Stop reason: SDUPTHER

## 2019-03-04 DIAGNOSIS — E11.9 CONTROLLED TYPE 2 DIABETES MELLITUS WITHOUT COMPLICATION, WITHOUT LONG-TERM CURRENT USE OF INSULIN (HCC): ICD-10-CM

## 2019-03-05 ENCOUNTER — OFFICE VISIT (OUTPATIENT)
Dept: INTERNAL MEDICINE CLINIC | Age: 64
End: 2019-03-05

## 2019-03-05 VITALS
TEMPERATURE: 98.4 F | SYSTOLIC BLOOD PRESSURE: 140 MMHG | OXYGEN SATURATION: 99 % | RESPIRATION RATE: 20 BRPM | HEART RATE: 83 BPM | BODY MASS INDEX: 33.23 KG/M2 | WEIGHT: 176 LBS | DIASTOLIC BLOOD PRESSURE: 80 MMHG | HEIGHT: 61 IN

## 2019-03-05 DIAGNOSIS — M12.811 ROTATOR CUFF ARTHROPATHY OF RIGHT SHOULDER: ICD-10-CM

## 2019-03-05 DIAGNOSIS — E78.00 HYPERCHOLESTEREMIA: ICD-10-CM

## 2019-03-05 DIAGNOSIS — M48.062 SPINAL STENOSIS, LUMBAR REGION WITH NEUROGENIC CLAUDICATION: Primary | ICD-10-CM

## 2019-03-05 DIAGNOSIS — E11.9 CONTROLLED TYPE 2 DIABETES MELLITUS WITHOUT COMPLICATION, WITHOUT LONG-TERM CURRENT USE OF INSULIN (HCC): ICD-10-CM

## 2019-03-05 DIAGNOSIS — I10 ESSENTIAL HYPERTENSION, BENIGN: ICD-10-CM

## 2019-03-05 LAB
CHOLEST SERPL-MCNC: 216 MG/DL
GLUCOSE POC: 174 MG/DL
HBA1C MFR BLD HPLC: 7.7 %
HDLC SERPL-MCNC: 50 MG/DL
LDL CHOLESTEROL POC: 135 MG/DL
NON-HDL GOAL (POC): 166
TCHOL/HDL RATIO (POC): 4.3
TRIGL SERPL-MCNC: 155 MG/DL

## 2019-03-05 RX ORDER — TRIAMCINOLONE ACETONIDE 40 MG/ML
40 INJECTION, SUSPENSION INTRA-ARTICULAR; INTRAMUSCULAR ONCE
Qty: 1 ML | Refills: 0
Start: 2019-03-05 | End: 2019-03-05

## 2019-03-05 RX ORDER — LIDOCAINE HYDROCHLORIDE 20 MG/ML
1 INJECTION, SOLUTION EPIDURAL; INFILTRATION; INTRACAUDAL; PERINEURAL ONCE
Qty: 1 ML | Refills: 0
Start: 2019-03-05 | End: 2019-03-05

## 2019-03-05 NOTE — PROGRESS NOTES
Sofia Zimmerman is a 61 y.o. female and presents with Leg Pain and Diabetes  . Subjective:  Back Pain Review:  Patient presents for evaluation of low back problems. Symptoms have been present for months and include pain in lower back (dull, mild in character; 8/10 in severity). Initial inciting event: unknown. Symptoms are worst: at times. Alleviating factors identifiable by patient are lying flat, medication . Exacerbating factors identifiable by patient are bending forwards, bending backwards. Treatments so far initiated by patient: medication Previous lower back problems: reported. Previous workup: mri. She still has an intermittent pain radiating down the rt.lower leg./  She has had pains on walking in her legs. The results of the MRI are noted. Hypertension Review:  The patient has hypertension . Diet and Lifestyle: generally follows a low sodium diet, exercises sporadically  Home BP Monitoring: is not measured at home. Pertinent ROS: taking medications as instructed, no medication side effects noted, no TIA's, no chest pain on exertion, no dyspnea on exertion, no swelling of ankles. Dyslipidemia Review:  Patient presents for evaluation of lipids. Compliance with treatment thus far has been excellent. A repeat fasting lipid profile was done. The patient does not use medications that may worsen dyslipidemias . The patient exercises sporadically. Diabetes Mellitus Review:  She has diabetes mellitus. Diabetic ROS - medication compliance: compliant all of the time, diabetic diet compliance: compliant all of the time, home glucose monitoring: is performed.    Known diabetic complications: none  Cardiovascular risk factors: family history, dyslipidemia, diabetes mellitus, obesity, hypertension  Current diabetic medications include oral agents/insulin   Eye exam current (within one year): no  Weight trend: stable  Prior visit with dietician: no  Current diet: \"healthy\" diet  in general  Current exercise: walking  Current monitoring regimen: home blood tests - daily  Home blood sugar records: trend: stable  Any episodes of hypoglycemia? no  Is She on ACE inhibitor or angiotensin II receptor blocker? Yes   Lab review: orders written for new lab studies as appropriate; see orders. Shoulder Pain Review:  Patient complains of right side shoulder pain. The symptoms began months ago Course of symptoms since onset has been gradually worsening. Pain is described as overall severity = moderate. Symptoms were incited by no known event. Patient denies N/A. Therapy to date includes OTC analgesics: effective. Review of Systems  Constitutional: negative for fevers, chills, anorexia and weight loss  Eyes:   negative for visual disturbance and irritation  ENT:   negative for tinnitus,sore throat,nasal congestion,ear pains. hoarseness  Respiratory:  negative for cough, hemoptysis, dyspnea,wheezing  CV:   negative for chest pain, palpitations, lower extremity edema  GI:   negative for nausea, vomiting, diarrhea, abdominal pain,melena  Endo:               negative for polyuria,polydipsia,polyphagia,heat intolerance  Genitourinary: negative for frequency, dysuria and hematuria  Integument:  negative for rash and pruritus  Hematologic:  negative for easy bruising and gum/nose bleeding  Musculoskel:  myalgias, arthralgias, back pain, muscle weakness, joint pain  Neurological:  negative for headaches, dizziness, vertigo, memory problems and gait   Behavl/Psych: negative for feelings of anxiety, depression, mood changes    Past Medical History:   Diagnosis Date    Acute cholecystitis 2/9/2011    Acute cystitis 2/9/2011    Cholecystitis 2/9/2011    Cholelithiasis 2/9/2011    Chronic Pain 2/9/2011    Essential hypertension, benign 2/9/2011    GERD (gastroesophageal reflux disease)     GERD, Gastro- Esophageal Reflux Diease (acid reflux) 2/9/2011    Herniated nucleus pulposus 2/9/2011    Hypercholesteremia 2/9/2011  Hypercholesteremia 2/9/2011    Hypertension     Neurogenic bladder, NOS 2/9/2011     Past Surgical History:   Procedure Laterality Date    HX GYN      HX HAMMER TOE REPAIR  6/21/2016    left foot      Social History     Socioeconomic History    Marital status:      Spouse name: Not on file    Number of children: Not on file    Years of education: Not on file    Highest education level: Not on file   Tobacco Use    Smoking status: Never Smoker    Smokeless tobacco: Never Used   Substance and Sexual Activity    Alcohol use: No    Drug use: No    Sexual activity: Yes     Partners: Male     Family History   Problem Relation Age of Onset    Heart Disease Mother     Cancer Father      Current Outpatient Medications   Medication Sig Dispense Refill    losartan (COZAAR) 25 mg tablet TAKE 1 TABLET BY MOUTH EVERY DAY 90 Tab 0    celecoxib (CELEBREX) 200 mg capsule TAKE 1 CAPSULE BY MOUTH EVERY DAY 30 Cap 1    fenofibrate micronized (LOFIBRA) 134 mg capsule TAKE ONE CAPSULE BY MOUTH EVERY DAY 30 Cap 6    pregabalin (LYRICA) 150 mg capsule Take 1 Cap by mouth two (2) times a day. Max Daily Amount: 300 mg. 60 Cap 6    metFORMIN (GLUCOPHAGE) 1,000 mg tablet Take 1 Tab by mouth two (2) times a day. 60 Tab 12    SITagliptin (JANUVIA) 100 mg tablet TAKE 1 TABLET EVERY DAY. 30 Tab 12    pantoprazole (PROTONIX) 40 mg tablet TAKE ONE TABLET BY MOUTH TWICE A DAY 60 Tab 12    meloxicam (MOBIC) 15 mg tablet Take 1 Tab by mouth daily. 30 Tab 12    fluticasone (FLONASE) 50 mcg/actuation nasal spray 2 Sprays by Both Nostrils route daily. 1 Bottle 12    cetirizine (ZYRTEC) 10 mg tablet Take 1 Tab by mouth daily. 30 Tab 3    irbesartan (AVAPRO) 150 mg tablet TAKE ONE TABLET EVERY NIGHT AT BEDTIME. 30 Tab 12    ipratropium (ATROVENT) 0.06 % nasal spray 2 Sprays by Both Nostrils route four (4) times daily. 15 mL 0    aspirin 81 mg tablet Take  by mouth.       fluticasone (FLONASE) 50 mcg/actuation nasal spray 2 Sprays by Both Nostrils route daily. 1 Bottle 12    esomeprazole (NEXIUM) 40 mg capsule TAKE ONE (1) CAPSULE TWICE DAILY. 60 Cap 12    LORazepam (ATIVAN) 1 mg tablet Take 1 Tab by mouth every four (4) hours as needed for Anxiety. Max Daily Amount: 6 mg. 4 Tab 0    diphenoxylate-atropine (LOMOTIL) 2.5-0.025 mg per tablet Take 1 tablet by mouth four (4) times daily as needed for Diarrhea. 60 tablet 0     No Known Allergies    Objective:  Visit Vitals  /80 (BP 1 Location: Right arm, BP Patient Position: Sitting)   Pulse 83   Temp 98.4 °F (36.9 °C) (Oral)   Resp 20   Ht 5' 1\" (1.549 m)   Wt 176 lb (79.8 kg)   SpO2 99%   BMI 33.25 kg/m²     Physical Exam:   General appearance - alert, well appearing, and in no distress  Mental status - alert, oriented to person, place, and time  EYE-LAURI, EOMI, corneas normal, no foreign bodies  ENT-ENT exam normal, no neck nodes or sinus tenderness  Nose - normal and patent, no erythema, discharge or polyps  Mouth - mucous membranes moist, pharynx normal without lesions  Neck - supple, no significant adenopathy   Chest - clear to auscultation, no wheezes, rales or rhonchi, symmetric air entry   Heart - normal rate, regular rhythm, normal S1, S2, no murmurs, rubs, clicks or gallops   Abdomen - soft, nontender, nondistended, no masses or organomegaly  Lymph- no adenopathy palpable  Ext-peripheral pulses normal, no pedal edema, no clubbing or cyanosis  Skin-Warm and dry.  no hyperpigmentation, vitiligo, or suspicious lesions  Neuro -alert, oriented, normal speech, no focal findings or movement disorder noted  Neck-normal C-spine, no tenderness, full ROM without pain  Feet-no nail deformities or callus formation with good pulses noted  Rt.shoulder-reduced range of motion of rt, subdeltoid tenderness, positive impingement signs  Back-tenderness lower lumbar spine and sacral spine noted,forward flexion,hyperextension impaired,negative straight leg raise      Results for orders placed or performed in visit on 03/05/19   AMB POC LIPID PROFILE   Result Value Ref Range    Cholesterol (POC) 216     Triglycerides (POC) 155     HDL Cholesterol (POC) 50     LDL Cholesterol (POC) 135 MG/DL    Non-HDL Goal (POC) 166     TChol/HDL Ratio (POC) 4.3        Assessment/Plan:    ICD-10-CM ICD-9-CM    1. Spinal stenosis, lumbar region with neurogenic claudication M48.062 724.03 REFERRAL TO NEUROSURGERY   2. Controlled type 2 diabetes mellitus without complication, without long-term current use of insulin (HCC) E11.9 250.00 AMB POC GLUCOSE BLOOD, BY GLUCOSE MONITORING DEVICE      AMB POC HEMOGLOBIN A1C      AMB POC URINE, MICROALBUMIN, SEMIQUANT (3 RESULTS)   3. Hypercholesteremia E78.00 272.0 AMB POC LIPID PROFILE   4. Essential hypertension, benign I10 401.1      Orders Placed This Encounter    REFERRAL TO NEUROSURGERY     Referral Priority:   Routine     Referral Type:   Consultation     Referral Reason:   Specialty Services Required     Referred to Provider:   Alxe Sutton MD     Number of Visits Requested:   1    AMB POC LIPID PROFILE    AMB POC GLUCOSE BLOOD, BY GLUCOSE MONITORING DEVICE    AMB POC HEMOGLOBIN A1C    AMB POC URINE, MICROALBUMIN, SEMIQUANT (3 RESULTS)     lose weight, increase physical activity, continue present diet with no restrictions, follow low fat diet, follow low salt diet,Take 81mg aspirin daily    Indications:   Symptomatic relief of pain    Procedure:  After consent was obtained, using sterile technique the right shoulder joint was prepped using alcohol. Local anesthetic used: 1% lidocaine. . The joint was entered and Kenalog 40 mg was mixed with 1% lidocaine 3 ml  and injected into the joint and the needle withdrawn. The procedure was well tolerated. The patient is asked to continue to rest the joint for a few more days before resuming regular activities. It may be more painful for the first 1-2 days.   Watch for fever, or increased swelling or persistent pain in the joint. Call or return to clinic prn if such symptoms occur or there is failure to improve as anticipated. [facility]  OFFICE PROCEDURE PROGRESS NOTE        Chart reviewed for the following:   Shelah Blizzard., MD, have reviewed the History, Physical and updated the Allergic reactions for [address] performed immediately prior to start of procedure:   I, Valentino Dearth., MD, have performed the following reviews on [address] prior to the start of the procedure:            * Patient was identified by name and date of birth   * Agreement on procedure being performed was verified  * Risks and Benefits explained to the patient  * Procedure site verified and marked as necessary  * Patient was positioned for comfort       Time: 2:40pm      Date of procedure: 3/5/2019    Procedure performed by:  Valentino Dearth., MD    Patient assisted by: nursing attendant    How tolerated by patient: tolerated the procedure well with no complications    Comments: none                      Patient Instructions   [employer] Activation    Thank you for requesting access to Roambi. Please follow the instructions below to securely access and download your online medical record. Roambi allows you to send messages to your doctor, view your test results, renew your prescriptions, schedule appointments, and more. How Do I Sign Up? 1. In your internet browser, go to [email or web address]  2. Click on the First Time User? Click Here link in the Sign In box. You will be redirect to the New Member Sign Up page. 3. Enter your Roambi Access Code exactly as it appears below. You will not need to use this code after youve completed the sign-up process. If you do not sign up before the expiration date, you must request a new code. Roambi Access Code:  Activation code not generated  Current Roambi Status: Active (This is the date your Roambi access code will )    4. Enter the last four digits of your Social Security Number (xxxx) and Date of Birth (mm/dd/yyyy) as indicated and click Submit. You will be taken to the next sign-up page. 5. Create a TeamStreamzt ID. This will be your EventMama login ID and cannot be changed, so think of one that is secure and easy to remember. 6. Create a EventMama password. You can change your password at any time. 7. Enter your Password Reset Question and Answer. This can be used at a later time if you forget your password. 8. Enter your e-mail address. You will receive e-mail notification when new information is available in [address] 9. Click Sign Up. You can now view and download portions of your medical record. 10. Click the Download Summary menu link to download a portable copy of your medical information. Additional Information    If you have questions, please visit the Frequently Asked Questions section of the EventMama website at [email or web address]. [email or web address] Remember, EventMama is NOT to be used for urgent needs. For medical emergencies, dial 911. Follow-up Disposition:  Return in about 4 weeks (around 2019), or if symptoms worsen or fail to improve. I have reviewed with the patient details of the assessment and plan and all questions were answered. Relevent patient education was performed. The most recent lab findings were reviewed with the patient. An After Visit Summary was printed and given to the patient.

## 2019-03-05 NOTE — PROGRESS NOTES
1. Have you been to the ER, urgent care clinic since your last visit? Hospitalized since your last visit?no    2. Have you seen or consulted any other health care providers outside of the [address] [place] [address] since your last visit? Include any pap smears or colon screening.  no    3 most recent PHQ Screens 8/6/2018   PHQ Not Done Medical Reason (indicate in comments)   Little interest or pleasure in doing things Not at all   Feeling down, depressed, irritable, or hopeless Not at all   Total Score PHQ 2 0     Chief Complaint   Patient presents with    Leg Pain    Diabetes

## 2019-03-05 NOTE — PATIENT INSTRUCTIONS
RIGIDharDashbook Activation    Thank you for requesting access to [email or web address]. Please follow the instructions below to securely access and download your online medical record. [email or web address] allows you to send messages to your doctor, view your test results, renew your prescriptions, schedule appointments, and more. How Do I Sign Up? 1. In your internet browser, go to [email or web address]  2. Click on the First Time User? Click Here link in the Sign In box. You will be redirect to the New Member Sign Up page. 3. Enter your [email or web address] Access Code exactly as it appears below. You will not need to use this code after youve completed the sign-up process. If you do not sign up before the expiration date, you must request a new code. [email or web address] Access Code: Activation code not generated  Current [email or web address] Status: Active (This is the date your [email or web address] access code will )    4. Enter the last four digits of your Social Security Number (xxxx) and Date of Birth (mm/dd/yyyy) as indicated and click Submit. You will be taken to the next sign-up page. 5. Create a [email or web address] ID. This will be your [email or web address] login ID and cannot be changed, so think of one that is secure and easy to remember. 6. Create a [email or web address] password. You can change your password at any time. 7. Enter your Password Reset Question and Answer. This can be used at a later time if you forget your password. 8. Enter your e-mail address. You will receive e-mail notification when new information is available in [address] 9. Click Sign Up. You can now view and download portions of your medical record. 10. Click the Download Summary menu link to download a portable copy of your medical information. Additional Information    If you have questions, please visit the Frequently Asked Questions section of the [email or web address] website at [email or web address] Remember, [email or web address] is NOT to be used for urgent needs. For medical emergencies, dial 911.

## 2019-03-06 RX ORDER — PREGABALIN 150 MG/1
CAPSULE ORAL
Qty: 60 CAP | Refills: 3 | Status: SHIPPED | OUTPATIENT
Start: 2019-03-06 | End: 2019-04-09 | Stop reason: SDUPTHER

## 2019-03-18 RX ORDER — CELECOXIB 200 MG/1
CAPSULE ORAL
Qty: 30 CAP | Refills: 1 | Status: SHIPPED | OUTPATIENT
Start: 2019-03-18 | End: 2019-05-21 | Stop reason: SDUPTHER

## 2019-04-09 DIAGNOSIS — E11.9 CONTROLLED TYPE 2 DIABETES MELLITUS WITHOUT COMPLICATION, WITHOUT LONG-TERM CURRENT USE OF INSULIN (HCC): ICD-10-CM

## 2019-04-10 ENCOUNTER — TELEPHONE (OUTPATIENT)
Dept: INTERNAL MEDICINE CLINIC | Age: 64
End: 2019-04-10

## 2019-04-10 RX ORDER — PREGABALIN 150 MG/1
CAPSULE ORAL
Qty: 60 CAP | Refills: 3 | Status: SHIPPED | OUTPATIENT
Start: 2019-04-10 | End: 2019-09-23 | Stop reason: SDUPTHER

## 2019-04-16 ENCOUNTER — OFFICE VISIT (OUTPATIENT)
Dept: INTERNAL MEDICINE CLINIC | Age: 64
End: 2019-04-16

## 2019-04-16 VITALS
RESPIRATION RATE: 16 BRPM | HEART RATE: 86 BPM | HEIGHT: 61 IN | OXYGEN SATURATION: 97 % | WEIGHT: 174 LBS | TEMPERATURE: 98.5 F | SYSTOLIC BLOOD PRESSURE: 120 MMHG | DIASTOLIC BLOOD PRESSURE: 84 MMHG | BODY MASS INDEX: 32.85 KG/M2

## 2019-04-16 DIAGNOSIS — M12.811 ROTATOR CUFF ARTHROPATHY OF RIGHT SHOULDER: ICD-10-CM

## 2019-04-16 DIAGNOSIS — E78.00 HYPERCHOLESTEREMIA: ICD-10-CM

## 2019-04-16 DIAGNOSIS — N63.0 NODULE OF SKIN OF BREAST: Primary | ICD-10-CM

## 2019-04-16 DIAGNOSIS — M48.062 SPINAL STENOSIS, LUMBAR REGION WITH NEUROGENIC CLAUDICATION: ICD-10-CM

## 2019-04-16 DIAGNOSIS — I10 ESSENTIAL HYPERTENSION, BENIGN: ICD-10-CM

## 2019-04-16 NOTE — PROGRESS NOTES
Love Joseph is a 61 y.o. female and presents with Back Pain (f/u); Mammogram Reminder; and Leg Pain  . Subjective:  Back Pain Review:  Patient presents for evaluation of low back problems. Symptoms have been present for months and include pain in lower back (dull, mild in character; 8/10 in severity). Initial inciting event: unknown. Symptoms are worst: at times. Alleviating factors identifiable by patient are lying flat, medication . Exacerbating factors identifiable by patient are bending forwards, bending backwards. Treatments so far initiated by patient: medication Previous lower back problems: reported. Previous workup: mri. She still has an intermittent pain radiating down the rt.lower leg./  She has had pains on walking in her legs. The results of the MRI are noted. She is to have an epidural steroid injection in her back soon. Hypertension Review:  The patient has hypertension . Diet and Lifestyle: generally follows a low sodium diet, exercises sporadically  Home BP Monitoring: is not measured at home. Pertinent ROS: taking medications as instructed, no medication side effects noted, no TIA's, no chest pain on exertion, no dyspnea on exertion, no swelling of ankles. Dyslipidemia Review:  Patient presents for evaluation of lipids. Compliance with treatment thus far has been excellent. A repeat fasting lipid profile was done. The patient does not use medications that may worsen dyslipidemias The patient exercises sporadically. Diabetes Mellitus Review:  She has diabetes mellitus. Diabetic ROS - medication compliance: compliant all of the time, diabetic diet compliance: compliant all of the time, home glucose monitoring: is performed.    Known diabetic complications: none  Cardiovascular risk factors: family history, dyslipidemia, diabetes mellitus, obesity, hypertension  Current diabetic medications include oral agents/insulin   Eye exam current (within one year): no  Weight trend: stable  Prior visit with dietician: no  Current diet: \"healthy\" diet  in general  Current exercise: walking  Current monitoring regimen: home blood tests - daily  Home blood sugar records: trend: stable  Any episodes of hypoglycemia? no  Is She on ACE inhibitor or angiotensin II receptor blocker? Yes   Lab review: orders written for new lab studies as appropriate; see orders. Shoulder Pain Review:  Patient complains of right side shoulder pains are improved. Health maintenance suggests the needs for a mammogram      Review of Systems  Constitutional: negative for fevers, chills, anorexia and weight loss  Eyes:   negative for visual disturbance and irritation  ENT:   negative for tinnitus,sore throat,nasal congestion,ear pains. hoarseness  Respiratory:  negative for cough, hemoptysis, dyspnea,wheezing  CV:   negative for chest pain, palpitations, lower extremity edema  GI:   negative for nausea, vomiting, diarrhea, abdominal pain,melena  Endo:               negative for polyuria,polydipsia,polyphagia,heat intolerance  Genitourinary: negative for frequency, dysuria and hematuria  Integument:  negative for rash and pruritus  Hematologic:  negative for easy bruising and gum/nose bleeding  Musculoskel:  myalgias, arthralgias, back pain, muscle weakness, joint pain  Neurological:  negative for headaches, dizziness, vertigo, memory problems and gait   Behavl/Psych: negative for feelings of anxiety, depression, mood changes    Past Medical History:   Diagnosis Date    Acute cholecystitis 2/9/2011    Acute cystitis 2/9/2011    Cholecystitis 2/9/2011    Cholelithiasis 2/9/2011    Chronic Pain 2/9/2011    Essential hypertension, benign 2/9/2011    GERD (gastroesophageal reflux disease)     GERD, Gastro- Esophageal Reflux Diease (acid reflux) 2/9/2011    Herniated nucleus pulposus 2/9/2011    Hypercholesteremia 2/9/2011    Hypercholesteremia 2/9/2011    Hypertension     Neurogenic bladder, NOS 2/9/2011 Past Surgical History:   Procedure Laterality Date    HX GYN      HX HAMMER TOE REPAIR  6/21/2016    left foot      Social History     Socioeconomic History    Marital status:      Spouse name: Not on file    Number of children: Not on file    Years of education: Not on file    Highest education level: Not on file   Tobacco Use    Smoking status: Never Smoker    Smokeless tobacco: Never Used   Substance and Sexual Activity    Alcohol use: No    Drug use: No    Sexual activity: Yes     Partners: Male     Family History   Problem Relation Age of Onset    Heart Disease Mother     Cancer Father      Current Outpatient Medications   Medication Sig Dispense Refill    LYRICA 150 mg capsule TAKE 1 TABLET BY MOUTH TWICE A DAY 60 Cap 3    celecoxib (CELEBREX) 200 mg capsule TAKE 1 CAPSULE BY MOUTH EVERY DAY 30 Cap 1    losartan (COZAAR) 25 mg tablet TAKE 1 TABLET BY MOUTH EVERY DAY 90 Tab 0    fenofibrate micronized (LOFIBRA) 134 mg capsule TAKE ONE CAPSULE BY MOUTH EVERY DAY 30 Cap 6    metFORMIN (GLUCOPHAGE) 1,000 mg tablet Take 1 Tab by mouth two (2) times a day. 60 Tab 12    SITagliptin (JANUVIA) 100 mg tablet TAKE 1 TABLET EVERY DAY. 30 Tab 12    pantoprazole (PROTONIX) 40 mg tablet TAKE ONE TABLET BY MOUTH TWICE A DAY 60 Tab 12    meloxicam (MOBIC) 15 mg tablet Take 1 Tab by mouth daily. 30 Tab 12    fluticasone (FLONASE) 50 mcg/actuation nasal spray 2 Sprays by Both Nostrils route daily. 1 Bottle 12    cetirizine (ZYRTEC) 10 mg tablet Take 1 Tab by mouth daily. 30 Tab 3    irbesartan (AVAPRO) 150 mg tablet TAKE ONE TABLET EVERY NIGHT AT BEDTIME. 30 Tab 12    ipratropium (ATROVENT) 0.06 % nasal spray 2 Sprays by Both Nostrils route four (4) times daily. 15 mL 0    aspirin 81 mg tablet Take  by mouth.  fluticasone (FLONASE) 50 mcg/actuation nasal spray 2 Sprays by Both Nostrils route daily. 1 Bottle 12    esomeprazole (NEXIUM) 40 mg capsule TAKE ONE (1) CAPSULE TWICE DAILY.  61 Cap 12    LORazepam (ATIVAN) 1 mg tablet Take 1 Tab by mouth every four (4) hours as needed for Anxiety. Max Daily Amount: 6 mg. 4 Tab 0    diphenoxylate-atropine (LOMOTIL) 2.5-0.025 mg per tablet Take 1 tablet by mouth four (4) times daily as needed for Diarrhea. 60 tablet 0     No Known Allergies    Objective:  Visit Vitals  /84   Pulse 86   Temp 98.5 °F (36.9 °C) (Oral)   Resp 16   Ht 5' 1\" (1.549 m)   Wt 174 lb (78.9 kg)   SpO2 97%   BMI 32.88 kg/m²     Physical Exam:   General appearance - alert, well appearing, and in mild distress  Mental status - alert, oriented to person, place, and time  EYE-LAURI, EOMI, corneas normal, no foreign bodies  ENT-ENT exam normal, no neck nodes or sinus tenderness  Nose - normal and patent, no erythema, discharge or polyps  Mouth - mucous membranes moist, pharynx normal without lesions  Neck - supple, no significant adenopathy   Chest - clear to auscultation, no wheezes, rales or rhonchi, symmetric air entry   Heart - normal rate, regular rhythm, normal S1, S2, no murmurs, rubs, clicks or gallops   Abdomen - soft, nontender, nondistended, no masses or organomegaly  Lymph- no adenopathy palpable  Ext-peripheral pulses normal, no pedal edema, no clubbing or cyanosis  Skin-Warm and dry.  no hyperpigmentation, vitiligo, or suspicious lesions  Neuro -alert, oriented, normal speech, no focal findings or movement disorder noted  Neck-normal C-spine, no tenderness, full ROM without pain  Feet-no nail deformities or callus formation with good pulses noted  Rt.shoulder-reduced range of motion of rt, subdeltoid tenderness, positive impingement signs  Back-tenderness lower lumbar spine and sacral spine noted,forward flexion,hyperextension impaired,negative straight leg raise  Rt.breast-mid lower 1/2 cm nodule    Results for orders placed or performed in visit on 03/05/19   AMB POC LIPID PROFILE   Result Value Ref Range    Cholesterol (POC) 216     Triglycerides (POC) 155     HDL Cholesterol (POC) 50     LDL Cholesterol (POC) 135 MG/DL    Non-HDL Goal (POC) 166     TChol/HDL Ratio (POC) 4.3    AMB POC GLUCOSE BLOOD, BY GLUCOSE MONITORING DEVICE   Result Value Ref Range    Glucose  mg/dL   AMB POC HEMOGLOBIN A1C   Result Value Ref Range    Hemoglobin A1c (POC) 7.7 %       Assessment/Plan:    ICD-10-CM ICD-9-CM    1. Nodule of skin of breast N63.0 793.89 CHRISTINE MAMMO BI DX INCL CAD   2. Hypercholesteremia E78.00 272.0    3. Essential hypertension, benign I10 401.1    4. Rotator cuff arthropathy of right shoulder M12.811 716.81    5. Spinal stenosis, lumbar region with neurogenic claudication M48.062 724.03      Orders Placed This Encounter    CHRISTINE MAMMO BI DX INCL CAD     Standing Status:   Future     Standing Expiration Date:   5/15/2020     Order Specific Question:   Reason for Exam     Answer:   abnrmal findings     lose weight, increase physical activity, continue present diet with no restrictions, follow low fat diet, follow low salt diet,Take 81mg aspirin daily                        Patient Instructions   [employer] Activation    Thank you for requesting access to [employer]. Please follow the instructions below to securely access and download your online medical record. [employer] allows you to send messages to your doctor, view your test results, renew your prescriptions, schedule appointments, and more. How Do I Sign Up? 1. In your internet browser, go to [email or web address]  2. Click on the First Time User? Click Here link in the Sign In box. You will be redirect to the New Member Sign Up page. 3. Enter your [employer] Access Code exactly as it appears below. You will not need to use this code after youve completed the sign-up process. If you do not sign up before the expiration date, you must request a new code. [employer] Access Code: Activation code not generated  Current [employer] Status: Active (This is the date your [employer] access code will )    4.  Enter the last four digits of your Social Security Number (xxxx) and Date of Birth (mm/dd/yyyy) as indicated and click Submit. You will be taken to the next sign-up page. 5. Create a [employer] ID. This will be your [employer] login ID and cannot be changed, so think of one that is secure and easy to remember. 6. Create a [employer] password. You can change your password at any time. 7. Enter your Password Reset Question and Answer. This can be used at a later time if you forget your password. 8. Enter your e-mail address. You will receive e-mail notification when new information is available in [address] 9. Click Sign Up. You can now view and download portions of your medical record. 10. Click the Download Summary menu link to download a portable copy of your medical information. Additional Information    If you have questions, please visit the Frequently Asked Questions section of the [employer] website at [email or web address] Remember, [employer] is NOT to be used for urgent needs. For medical emergencies, dial 911. Follow-up and Dispositions    · Return in about 1 month (around 5/14/2019). I have reviewed with the patient details of the assessment and plan and all questions were answered. Relevent patient education was performed. The most recent lab findings were reviewed with the patient. An After Visit Summary was printed and given to the patient.

## 2019-04-16 NOTE — PATIENT INSTRUCTIONS
[employer] Activation    Thank you for requesting access to [employer]. Please follow the instructions below to securely access and download your online medical record. [employer] allows you to send messages to your doctor, view your test results, renew your prescriptions, schedule appointments, and more. How Do I Sign Up? 1. In your internet browser, go to [email or web address]  2. Click on the First Time User? Click Here link in the Sign In box. You will be redirect to the New Member Sign Up page. 3. Enter your [employer] Access Code exactly as it appears below. You will not need to use this code after youve completed the sign-up process. If you do not sign up before the expiration date, you must request a new code. [employer] Access Code: Activation code not generated  Current [employer] Status: Active (This is the date your [employer] access code will )    4. Enter the last four digits of your Social Security Number (xxxx) and Date of Birth (mm/dd/yyyy) as indicated and click Submit. You will be taken to the next sign-up page. 5. Create a [employer] ID. This will be your [employer] login ID and cannot be changed, so think of one that is secure and easy to remember. 6. Create a [employer] password. You can change your password at any time. 7. Enter your Password Reset Question and Answer. This can be used at a later time if you forget your password. 8. Enter your e-mail address. You will receive e-mail notification when new information is available in [address] 9. Click Sign Up. You can now view and download portions of your medical record. 10. Click the Download Summary menu link to download a portable copy of your medical information. Additional Information    If you have questions, please visit the Frequently Asked Questions section of the [employer] website at [email or web address] Remember, [employer] is NOT to be used for urgent needs. For medical emergencies, dial 911.

## 2019-04-16 NOTE — PROGRESS NOTES
1. Have you been to the ER, urgent care clinic since your last visit? Hospitalized since your last visit?no    2. Have you seen or consulted any other health care providers outside of the [address] [place] [address] since your last visit? Include any pap smears or colon screening. No    3 most recent PHQ Screens 8/6/2018   PHQ Not Done Medical Reason (indicate in comments)   Little interest or pleasure in doing things Not at all   Feeling down, depressed, irritable, or hopeless Not at all   Total Score PHQ 2 0     Per Dr. Abdullahi Gray.,  verbal order given for needed amb poc labs.   Chief Complaint   Patient presents with    Back Pain     f/u

## 2019-04-17 ENCOUNTER — DOCUMENTATION ONLY (OUTPATIENT)
Dept: INTERNAL MEDICINE CLINIC | Age: 64
End: 2019-04-17

## 2019-04-24 ENCOUNTER — HOSPITAL ENCOUNTER (OUTPATIENT)
Dept: MAMMOGRAPHY | Age: 64
Discharge: HOME | End: 2019-04-24
Attending: INTERNAL MEDICINE
Payer: MEDICAID

## 2019-04-24 ENCOUNTER — HOSPITAL ENCOUNTER (OUTPATIENT)
Dept: ULTRASOUND IMAGING | Age: 64
Discharge: HOME | End: 2019-04-24
Attending: INTERNAL MEDICINE
Payer: MEDICAID

## 2019-04-24 DIAGNOSIS — N63.0 NODULE OF SKIN OF BREAST: ICD-10-CM

## 2019-04-24 DIAGNOSIS — R92.8 ABNORMAL MAMMOGRAM: ICD-10-CM

## 2019-04-24 PROCEDURE — 76642 ULTRASOUND BREAST LIMITED: CPT

## 2019-04-24 PROCEDURE — 77066 DX MAMMO INCL CAD BI: CPT

## 2019-05-22 RX ORDER — CELECOXIB 200 MG/1
CAPSULE ORAL
Qty: 30 CAP | Refills: 1 | Status: SHIPPED | OUTPATIENT
Start: 2019-05-22 | End: 2019-07-18 | Stop reason: SDUPTHER

## 2019-05-29 DIAGNOSIS — I10 ESSENTIAL HYPERTENSION, BENIGN: ICD-10-CM

## 2019-05-29 DIAGNOSIS — E11.9 CONTROLLED TYPE 2 DIABETES MELLITUS WITHOUT COMPLICATION, WITHOUT LONG-TERM CURRENT USE OF INSULIN (HCC): ICD-10-CM

## 2019-05-29 DIAGNOSIS — E78.00 HYPERCHOLESTEREMIA: ICD-10-CM

## 2019-05-30 RX ORDER — LOSARTAN POTASSIUM 25 MG/1
TABLET ORAL
Qty: 90 TAB | Refills: 0 | Status: SHIPPED | OUTPATIENT
Start: 2019-05-30 | End: 2019-09-11 | Stop reason: SDUPTHER

## 2019-07-13 ENCOUNTER — HOSPITAL ENCOUNTER (EMERGENCY)
Age: 64
Discharge: HOME | End: 2019-07-13
Attending: EMERGENCY MEDICINE
Payer: MEDICAID

## 2019-07-13 VITALS
HEART RATE: 65 BPM | OXYGEN SATURATION: 100 % | SYSTOLIC BLOOD PRESSURE: 130 MMHG | HEIGHT: 61 IN | BODY MASS INDEX: 33.42 KG/M2 | WEIGHT: 177.03 LBS | TEMPERATURE: 98.3 F | RESPIRATION RATE: 18 BRPM | DIASTOLIC BLOOD PRESSURE: 90 MMHG

## 2019-07-13 DIAGNOSIS — R51.9 RIGHT-SIDED HEADACHE: ICD-10-CM

## 2019-07-13 DIAGNOSIS — N17.9 AKI (ACUTE KIDNEY INJURY) (HCC): ICD-10-CM

## 2019-07-13 DIAGNOSIS — E11.65 UNCONTROLLED TYPE 2 DIABETES MELLITUS WITH HYPERGLYCEMIA (HCC): ICD-10-CM

## 2019-07-13 DIAGNOSIS — G44.209 ACUTE NON INTRACTABLE TENSION-TYPE HEADACHE: Primary | ICD-10-CM

## 2019-07-13 LAB
ANION GAP SERPL CALC-SCNC: 6 MMOL/L (ref 5–15)
BASOPHILS # BLD: 0 K/UL (ref 0–0.1)
BASOPHILS NFR BLD: 1 % (ref 0–1)
BUN SERPL-MCNC: 21 MG/DL (ref 6–20)
BUN/CREAT SERPL: 17 (ref 12–20)
CALCIUM SERPL-MCNC: 9.3 MG/DL (ref 8.5–10.1)
CHLORIDE SERPL-SCNC: 104 MMOL/L (ref 97–108)
CO2 SERPL-SCNC: 28 MMOL/L (ref 21–32)
CREAT SERPL-MCNC: 1.22 MG/DL (ref 0.55–1.02)
DIFFERENTIAL METHOD BLD: ABNORMAL
EOSINOPHIL # BLD: 0.1 K/UL (ref 0–0.4)
EOSINOPHIL NFR BLD: 2 % (ref 0–7)
ERYTHROCYTE [DISTWIDTH] IN BLOOD BY AUTOMATED COUNT: 13.7 % (ref 11.5–14.5)
GLUCOSE SERPL-MCNC: 200 MG/DL (ref 65–100)
HCT VFR BLD AUTO: 41.3 % (ref 35–47)
HGB BLD-MCNC: 13.4 G/DL (ref 11.5–16)
IMM GRANULOCYTES # BLD AUTO: 0 K/UL (ref 0–0.04)
IMM GRANULOCYTES NFR BLD AUTO: 0 % (ref 0–0.5)
LYMPHOCYTES # BLD: 2.1 K/UL (ref 0.8–3.5)
LYMPHOCYTES NFR BLD: 29 % (ref 12–49)
MCH RBC QN AUTO: 27.6 PG (ref 26–34)
MCHC RBC AUTO-ENTMCNC: 32.4 G/DL (ref 30–36.5)
MCV RBC AUTO: 85 FL (ref 80–99)
MONOCYTES # BLD: 0.5 K/UL (ref 0–1)
MONOCYTES NFR BLD: 7 % (ref 5–13)
NEUTS SEG # BLD: 4.5 K/UL (ref 1.8–8)
NEUTS SEG NFR BLD: 61 % (ref 32–75)
NRBC # BLD: 0 K/UL (ref 0–0.01)
NRBC BLD-RTO: 0 PER 100 WBC
PLATELET # BLD AUTO: 446 K/UL (ref 150–400)
PMV BLD AUTO: 9.8 FL (ref 8.9–12.9)
POTASSIUM SERPL-SCNC: 4.1 MMOL/L (ref 3.5–5.1)
RBC # BLD AUTO: 4.86 M/UL (ref 3.8–5.2)
SODIUM SERPL-SCNC: 138 MMOL/L (ref 136–145)
WBC # BLD AUTO: 7.3 K/UL (ref 3.6–11)

## 2019-07-13 PROCEDURE — 96374 THER/PROPH/DIAG INJ IV PUSH: CPT

## 2019-07-13 PROCEDURE — 74011250636 HC RX REV CODE- 250/636: Performed by: EMERGENCY MEDICINE

## 2019-07-13 PROCEDURE — 99282 EMERGENCY DEPT VISIT SF MDM: CPT

## 2019-07-13 PROCEDURE — 80048 BASIC METABOLIC PNL TOTAL CA: CPT

## 2019-07-13 PROCEDURE — 36415 COLL VENOUS BLD VENIPUNCTURE: CPT

## 2019-07-13 PROCEDURE — 74011250637 HC RX REV CODE- 250/637: Performed by: EMERGENCY MEDICINE

## 2019-07-13 PROCEDURE — 85025 COMPLETE CBC W/AUTO DIFF WBC: CPT

## 2019-07-13 RX ORDER — BUTALBITAL, ACETAMINOPHEN AND CAFFEINE 300; 40; 50 MG/1; MG/1; MG/1
1 CAPSULE ORAL
Qty: 20 CAP | Refills: 0 | Status: SHIPPED | OUTPATIENT
Start: 2019-07-13 | End: 2019-10-25

## 2019-07-13 RX ORDER — KETOROLAC TROMETHAMINE 30 MG/ML
30 INJECTION, SOLUTION INTRAMUSCULAR; INTRAVENOUS
Status: COMPLETED | OUTPATIENT
Start: 2019-07-13 | End: 2019-07-13

## 2019-07-13 RX ORDER — DIPHENHYDRAMINE HYDROCHLORIDE 50 MG/ML
25 INJECTION, SOLUTION INTRAMUSCULAR; INTRAVENOUS
Status: DISCONTINUED | OUTPATIENT
Start: 2019-07-13 | End: 2019-07-13 | Stop reason: HOSPADM

## 2019-07-13 RX ORDER — BUTALBITAL, ACETAMINOPHEN AND CAFFEINE 50; 325; 40 MG/1; MG/1; MG/1
1 TABLET ORAL
Status: COMPLETED | OUTPATIENT
Start: 2019-07-13 | End: 2019-07-13

## 2019-07-13 RX ORDER — TRAMADOL HYDROCHLORIDE 50 MG/1
50 TABLET ORAL
Qty: 10 TAB | Refills: 0 | Status: SHIPPED | OUTPATIENT
Start: 2019-07-13 | End: 2019-07-18

## 2019-07-13 RX ADMIN — BUTALBITAL, ACETAMINOPHEN AND CAFFEINE 1 TABLET: 50; 325; 40 TABLET ORAL at 19:12

## 2019-07-13 RX ADMIN — KETOROLAC TROMETHAMINE 30 MG: 30 INJECTION, SOLUTION INTRAMUSCULAR at 18:33

## 2019-07-13 NOTE — ED PROVIDER NOTES
EMERGENCY DEPARTMENT HISTORY AND PHYSICAL EXAM      Date: 7/13/2019  Patient Name: Erum Mendez  Patient Age and Sex: 61 y.o. female    History of Presenting Illness     Chief Complaint   Patient presents with    Headache     times one week       History Provided By: Patient    HPI: Erum Mendez, 61 y.o. female with past medical history as documented below presents to the ED with c/o of one week of moderate intensity, progressive bitemporal headache unrelieved with OTC pain medications. Pt states she has also been \"on the go\" and feels dehydrated. She reports current pain rated at 7/10 and describes it as throbbing. Pt states loud noise and the heat make the pain worse. Pt denies any other alleviating or exacerbating factors. Additionally, pt specifically denies any recent fever, chills, nausea, vomiting, abdominal pain, CP, SOB, lightheadedness, dizziness, numbness, weakness, BLE swelling, heart palpitations, urinary sxs, diarrhea, constipation, melena, hematochezia, cough, or congestion. PCP: Ness Vazquez MD    There are no other complaints, changes or physical findings at this time.      Past History   Past Medical History:  Past Medical History:   Diagnosis Date    Acute cholecystitis 2/9/2011    Acute cystitis 2/9/2011    Cholecystitis 2/9/2011    Cholelithiasis 2/9/2011    Chronic Pain 2/9/2011    Essential hypertension, benign 2/9/2011    GERD (gastroesophageal reflux disease)     GERD, Gastro- Esophageal Reflux Diease (acid reflux) 2/9/2011    Herniated nucleus pulposus 2/9/2011    Hypercholesteremia 2/9/2011    Hypercholesteremia 2/9/2011    Hypertension     Neurogenic bladder, NOS 2/9/2011       Past Surgical History:  Past Surgical History:   Procedure Laterality Date    HX GYN      HX HAMMER TOE REPAIR  6/21/2016    left foot        Family History:  Family History   Problem Relation Age of Onset    Heart Disease Mother     Cancer Father        Social History:  Social History Tobacco Use    Smoking status: Never Smoker    Smokeless tobacco: Never Used   Substance Use Topics    Alcohol use: No    Drug use: No       Allergies: Allergies   Allergen Reactions    Diclofenac Itching       Current Medications:  No current facility-administered medications on file prior to encounter. Current Outpatient Medications on File Prior to Encounter   Medication Sig Dispense Refill    losartan (COZAAR) 25 mg tablet TAKE 1 TABLET BY MOUTH EVERY DAY 90 Tab 0    celecoxib (CELEBREX) 200 mg capsule TAKE 1 CAPSULE BY MOUTH EVERY DAY 30 Cap 1    LYRICA 150 mg capsule TAKE 1 TABLET BY MOUTH TWICE A DAY 60 Cap 3    fenofibrate micronized (LOFIBRA) 134 mg capsule TAKE ONE CAPSULE BY MOUTH EVERY DAY 30 Cap 6    metFORMIN (GLUCOPHAGE) 1,000 mg tablet Take 1 Tab by mouth two (2) times a day. 60 Tab 12    SITagliptin (JANUVIA) 100 mg tablet TAKE 1 TABLET EVERY DAY. 30 Tab 12    pantoprazole (PROTONIX) 40 mg tablet TAKE ONE TABLET BY MOUTH TWICE A DAY 60 Tab 12    meloxicam (MOBIC) 15 mg tablet Take 1 Tab by mouth daily. 30 Tab 12    fluticasone (FLONASE) 50 mcg/actuation nasal spray 2 Sprays by Both Nostrils route daily. 1 Bottle 12    cetirizine (ZYRTEC) 10 mg tablet Take 1 Tab by mouth daily. 30 Tab 3    irbesartan (AVAPRO) 150 mg tablet TAKE ONE TABLET EVERY NIGHT AT BEDTIME. 30 Tab 12    fluticasone (FLONASE) 50 mcg/actuation nasal spray 2 Sprays by Both Nostrils route daily. 1 Bottle 12    ipratropium (ATROVENT) 0.06 % nasal spray 2 Sprays by Both Nostrils route four (4) times daily. 15 mL 0    esomeprazole (NEXIUM) 40 mg capsule TAKE ONE (1) CAPSULE TWICE DAILY. 60 Cap 12    LORazepam (ATIVAN) 1 mg tablet Take 1 Tab by mouth every four (4) hours as needed for Anxiety. Max Daily Amount: 6 mg. 4 Tab 0    diphenoxylate-atropine (LOMOTIL) 2.5-0.025 mg per tablet Take 1 tablet by mouth four (4) times daily as needed for Diarrhea. 60 tablet 0    aspirin 81 mg tablet Take  by mouth. Review of Systems   Review of Systems   Constitutional: Negative. Negative for chills and fever. HENT: Negative. Negative for congestion, facial swelling, rhinorrhea, sore throat, trouble swallowing and voice change. Eyes: Negative. Respiratory: Negative. Negative for apnea, cough, chest tightness, shortness of breath and wheezing. Cardiovascular: Negative. Negative for chest pain, palpitations and leg swelling. Gastrointestinal: Negative. Negative for abdominal distention, abdominal pain, blood in stool, constipation, diarrhea, nausea and vomiting. Endocrine: Negative. Negative for cold intolerance, heat intolerance and polyuria. Genitourinary: Negative. Negative for difficulty urinating, dysuria, flank pain, frequency, hematuria and urgency. Musculoskeletal: Negative. Negative for arthralgias, back pain, myalgias, neck pain and neck stiffness. Skin: Negative. Negative for color change and rash. Neurological: Positive for headaches. Negative for dizziness, syncope, facial asymmetry, speech difficulty, weakness, light-headedness and numbness. Hematological: Negative. Does not bruise/bleed easily. Psychiatric/Behavioral: Negative. Negative for confusion and self-injury. The patient is not nervous/anxious. Physical Exam   Physical Exam   Constitutional: She is oriented to person, place, and time. She appears well-developed and well-nourished. No distress. HENT:   Head: Normocephalic and atraumatic. Mouth/Throat: Oropharynx is clear and moist. No oropharyngeal exudate. Eyes: Pupils are equal, round, and reactive to light. Conjunctivae and EOM are normal.   Neck: Normal range of motion. Cardiovascular: Normal rate, regular rhythm and normal heart sounds. Exam reveals no gallop and no friction rub. No murmur heard. Pulmonary/Chest: Effort normal and breath sounds normal. No respiratory distress. She has no wheezes. She has no rales. She exhibits no tenderness. Abdominal: Soft. Bowel sounds are normal. She exhibits no distension and no mass. There is no tenderness. There is no rebound and no guarding. Musculoskeletal: Normal range of motion. She exhibits no edema, tenderness or deformity. Neurological: She is alert and oriented to person, place, and time. She displays normal reflexes. No cranial nerve deficit. She exhibits normal muscle tone. Coordination normal.   Skin: Skin is warm. No rash noted. She is not diaphoretic. Psychiatric: She has a normal mood and affect. Nursing note and vitals reviewed. Diagnostic Study Results     Labs -  Recent Results (from the past 24 hour(s))   CBC WITH AUTOMATED DIFF    Collection Time: 07/13/19  4:21 PM   Result Value Ref Range    WBC 7.3 3.6 - 11.0 K/uL    RBC 4.86 3.80 - 5.20 M/uL    HGB 13.4 11.5 - 16.0 g/dL    HCT 41.3 35.0 - 47.0 %    MCV 85.0 80.0 - 99.0 FL    MCH 27.6 26.0 - 34.0 PG    MCHC 32.4 30.0 - 36.5 g/dL    RDW 13.7 11.5 - 14.5 %    PLATELET 378 (H) 241 - 400 K/uL    MPV 9.8 8.9 - 12.9 FL    NRBC 0.0 0  WBC    ABSOLUTE NRBC 0.00 0.00 - 0.01 K/uL    NEUTROPHILS 61 32 - 75 %    LYMPHOCYTES 29 12 - 49 %    MONOCYTES 7 5 - 13 %    EOSINOPHILS 2 0 - 7 %    BASOPHILS 1 0 - 1 %    IMMATURE GRANULOCYTES 0 0.0 - 0.5 %    ABS. NEUTROPHILS 4.5 1.8 - 8.0 K/UL    ABS. LYMPHOCYTES 2.1 0.8 - 3.5 K/UL    ABS. MONOCYTES 0.5 0.0 - 1.0 K/UL    ABS. EOSINOPHILS 0.1 0.0 - 0.4 K/UL    ABS. BASOPHILS 0.0 0.0 - 0.1 K/UL    ABS. IMM.  GRANS. 0.0 0.00 - 0.04 K/UL    DF AUTOMATED     METABOLIC PANEL, BASIC    Collection Time: 07/13/19  4:21 PM   Result Value Ref Range    Sodium 138 136 - 145 mmol/L    Potassium 4.1 3.5 - 5.1 mmol/L    Chloride 104 97 - 108 mmol/L    CO2 28 21 - 32 mmol/L    Anion gap 6 5 - 15 mmol/L    Glucose 200 (H) 65 - 100 mg/dL    BUN 21 (H) 6 - 20 MG/DL    Creatinine 1.22 (H) 0.55 - 1.02 MG/DL    BUN/Creatinine ratio 17 12 - 20      GFR est AA 54 (L) >60 ml/min/1.73m2    GFR est non-AA 45 (L) >60 ml/min/1.73m2    Calcium 9.3 8.5 - 10.1 MG/DL       Radiologic Studies -   No orders to display     CT Results  (Last 48 hours)    None        CXR Results  (Last 48 hours)    None          Medical Decision Making   I am the first provider for this patient. I reviewed the vital signs, available nursing notes, past medical history, past surgical history, family history and social history. Vital Signs-Reviewed the patient's vital signs. Patient Vitals for the past 24 hrs:   Temp Pulse Resp BP SpO2   07/13/19 1543 98.3 °F (36.8 °C) 65 18 130/90 100 %       Pulse Oximetry Analysis - 100% on RA    Cardiac Monitor:   Rate: 65 bpm  Rhythm: Normal Sinus Rhythm      Records Reviewed: Nursing Notes, Old Medical Records, Previous electrocardiograms, Previous Radiology Studies and Previous Laboratory Studies    Provider Notes (Medical Decision Making):   Pt resents with acute headache; afebrile with stable vitals; exam is without focal deficits. DDx: migraine, tension headache, cluster HA, stress, hypertensive urgency, dehydration. HA was gradual onset, pt neuro intact, no nausea/vomiting/focal weakness/sensory change to suggest CVA or ICH. Also pt is not immunocompromised, no fever, no focal weakness to suggest brain abscess. Therefore, no CT head. No neck stiffness, fever, AMS, photophobia to suggest meningitis. Neg kernig and Brudzinski. Therefore no LP. No jaw claudication, visual changes or temporal pain to suggest temporal arteritis, therefore no ESR/CRP. No eye pain, PERRL, no red eye to suggest glaucoma. No risk factors for CO. Will treat pain and reassess. ED Course:   Initial assessment performed. The patients presenting problems have been discussed, and they are in agreement with the care plan formulated and outlined with them. I have encouraged them to ask questions as they arise throughout their visit. HYPERTENSION COUNSELING   Education was provided to the patient today regarding their hypertension. Patient is made aware of their elevated blood pressure and is instructed to follow up this week with their Primary Care for a recheck. Patient is counseled regarding consequences of chronic, uncontrolled hypertension including kidney disease, heart disease, stroke or even death. Patient states their understanding and agrees to follow up this week. Additionally, during their visit, I discussed sodium restriction, maintaining ideal body weight and regular exercise program as physiologic means to achieve blood pressure control. The patient will strive towards this. I reviewed our electronic medical record system for any past medical records that were available that may contribute to the patient's current condition, the nursing notes and vital signs from today's visit. James Bynum MD    Medications Administered During ED Course:  Medications   ketorolac (TORADOL) injection 30 mg (30 mg IntraVENous Given 7/13/19 1833)   butalbital-acetaminophen-caffeine (FIORICET, ESGIC) -40 mg per tablet 1 Tab (1 Tab Oral Given 7/13/19 1912)     Progress Note:  Patient has been reassessed and reports feeling better and symptoms have improved after ED treatment. Naseem Forrester is able to tolerate PO and ambulate per baseline. Kwadwo Mayorga final labs and imaging have been reviewed with her. She has been counseled regarding her diagnosis. She verbally conveys understanding and agreement of the signs, symptoms, diagnosis, treatment and prognosis and additionally agrees to follow up as recommended with Dr. Osvaldo Baker MD in 24 - 48 hours. She also agrees with the care-plan and conveys that all of her questions have been answered.   I have also put together some discharge instructions for her that include: 1) educational information regarding their diagnosis, 2) how to care for their diagnosis at home, as well a 3) list of reasons why they would want to return to the ED prior to their follow-up appointment, should their condition change. I have answered all questions to the patient's satisfaction. Strict return precautions given. She both understood and agreed with plan as discussed above. Vital signs stable for discharge. Disposition: DISCHARGE     The pt is ready for discharge. The pt's signs, symptoms, diagnosis, and discharge instructions have been discussed and pt has conveyed their understanding. The pt is to follow up as recommended or return to ER should their symptoms worsen. Plan has been discussed and pt is in agreement. PLAN:  1. Discharge Medication List as of 7/13/2019  7:42 PM      START taking these medications    Details   butalbital-acetaminophen-caff (FIORICET) -40 mg per capsule Take 1 Cap by mouth every four (4) hours as needed for Pain. Indications: Tension Headache, Print, Disp-20 Cap, R-0      traMADol (ULTRAM) 50 mg tablet Take 1 Tab by mouth every six (6) hours as needed for Pain for up to 5 days. Max Daily Amount: 200 mg. Indications: Pain, Print, Disp-10 Tab, R-0         CONTINUE these medications which have NOT CHANGED    Details   losartan (COZAAR) 25 mg tablet TAKE 1 TABLET BY MOUTH EVERY DAY, Normal, Disp-90 Tab, R-0      celecoxib (CELEBREX) 200 mg capsule TAKE 1 CAPSULE BY MOUTH EVERY DAY, Normal, Disp-30 Cap, R-1      LYRICA 150 mg capsule TAKE 1 TABLET BY MOUTH TWICE A DAY, PrintThis request is for a new prescription for a controlled substance as required by Federal/State law. Disp-60 Cap, R-3      fenofibrate micronized (LOFIBRA) 134 mg capsule TAKE ONE CAPSULE BY MOUTH EVERY DAY, Normal, Disp-30 Cap, R-6      metFORMIN (GLUCOPHAGE) 1,000 mg tablet Take 1 Tab by mouth two (2) times a day., Normal, Disp-60 Tab, R-12      SITagliptin (JANUVIA) 100 mg tablet TAKE 1 TABLET EVERY DAY., Mail Order, Disp-30 Tab, R-12      pantoprazole (PROTONIX) 40 mg tablet TAKE ONE TABLET BY MOUTH TWICE A DAY, Normal, Disp-60 Tab, R-12      meloxicam (MOBIC) 15 mg tablet Take 1 Tab by mouth daily. , Normal, Disp-30 Tab, R-12      !! fluticasone (FLONASE) 50 mcg/actuation nasal spray 2 Sprays by Both Nostrils route daily. , Normal, Disp-1 Bottle, R-12      cetirizine (ZYRTEC) 10 mg tablet Take 1 Tab by mouth daily. , Normal, Disp-30 Tab, R-3      irbesartan (AVAPRO) 150 mg tablet TAKE ONE TABLET EVERY NIGHT AT BEDTIME., Normal, Disp-30 Tab, R-12, JI      !! fluticasone (FLONASE) 50 mcg/actuation nasal spray 2 Sprays by Both Nostrils route daily. , Normal, Disp-1 Bottle, R-12      ipratropium (ATROVENT) 0.06 % nasal spray 2 Sprays by Both Nostrils route four (4) times daily. , Normal, Disp-15 mL, R-0      esomeprazole (NEXIUM) 40 mg capsule TAKE ONE (1) CAPSULE TWICE DAILY., Normal, Disp-60 Cap, R-12      LORazepam (ATIVAN) 1 mg tablet Take 1 Tab by mouth every four (4) hours as needed for Anxiety. Max Daily Amount: 6 mg., Print, Disp-4 Tab, R-0      diphenoxylate-atropine (LOMOTIL) 2.5-0.025 mg per tablet Take 1 tablet by mouth four (4) times daily as needed for Diarrhea., Print, Disp-60 tablet, R-0      aspirin 81 mg tablet Take  by mouth. Historical Med       !! - Potential duplicate medications found. Please discuss with provider. 2.   Follow-up Information     Follow up With Specialties Details Why Contact Info    Gregorio Artis MD Internal Medicine   [address]  [phone]      [facility] EMERGENCY DEPT Emergency Medicine  As needed, If symptoms worsen [address]   [address] Neurology [facility] [address]   [address] [address]  [phone]    Gregorio Artis MD Internal Medicine   [facility]  [phone]      [facility] EMERGENCY DEPT Emergency Medicine  As needed, If symptoms worsen [address]  [address]  [phone]          Return to ED if worse  Diagnosis     Clinical Impression:   1.  Acute non intractable tension-type headache 2. Right-sided headache    3. JAYE (acute kidney injury) ([facility] [address]    4. Uncontrolled type 2 diabetes mellitus with hyperglycemia ([facility] [address]        Attestation:  I personally performed the services described in this documentation on this date 7/13/2019 for patient, Ted Black. Mee Paris MD    Please note that this dictation was completed with [employer] ([employer]), the computer voice recognition software. Quite often unanticipated grammatical, syntax, homophones, and other interpretive errors are inadvertently transcribed by the computer software. Please disregard these errors. Please excuse any errors that have escaped final proofreading. This note will not be viewable in [address]

## 2019-07-13 NOTE — DISCHARGE INSTRUCTIONS
Thank you for allowing us to take care of you today! We hope we addressed all of your concerns and needs. We strive to provide excellent quality care in the Emergency Department. You will receive a survey after your visit to evaluate the care you were provided. Should you receive a survey from us, we invite you to share your experience and tell us what made it excellent. It was a pleasure serving you, we invite you to share your experience with us, in our pursuit for excellence, should you be selected to receive a survey. The exam and treatment you received in the Emergency Department were for an urgent problem and are not intended as complete care. It is important that you follow up with a doctor, nurse practitioner, or physician assistant for ongoing care. If your symptoms become worse or you do not improve as expected and you are unable to reach your usual health care provider, you should return to the Emergency Department. We are available 24 hours a day. Please take your discharge instructions with you when you go to your follow-up appointment. If you have any problem arranging a follow-up appointment, contact the Emergency Department immediately. If a prescription has been provided, please have it filled as soon as possible to prevent a delay in treatment. Read the entire medication instruction sheet provided to you by the pharmacy. If you have any questions or reservations about taking the medication due to side effects or interactions with other medications, please call your primary care physician or contact the ER to speak with the charge nurse. Make an appointment with your family doctor or the physician you were referred to for follow-up of this visit as instructed on your discharge paperwork, as this is mandatory follow-up. Return to the ER if you are unable to be seen or if you are unable to be seen in a timely manner.     If you have any problem arranging the follow-up visit, contact the Emergency Department immediately. I hope you feel better and thank you again for allow us to provide you with excellent care today at [facility]! Warmest regards,    Willow Nolasco MD  Emergency Medicine Physician  [facility]              Patient Education        Headache: Care Instructions  Your Care Instructions    Headaches have many possible causes. Most headaches aren't a sign of a more serious problem, and they will get better on their own. Home treatment may help you feel better faster. The doctor has checked you carefully, but problems can develop later. If you notice any problems or new symptoms, get medical treatment right away. Follow-up care is a key part of your treatment and safety. Be sure to make and go to all appointments, and call your doctor if you are having problems. It's also a good idea to know your test results and keep a list of the medicines you take. How can you care for yourself at home? · Do not drive if you have taken a prescription pain medicine. · Rest in a quiet, dark room until your headache is gone. Close your eyes and try to relax or go to sleep. Don't watch TV or read. · Put a cold, moist cloth or cold pack on the painful area for 10 to 20 minutes at a time. Put a thin cloth between the cold pack and your skin. · Use a warm, moist towel or a heating pad set on low to relax tight shoulder and neck muscles. · Have someone gently massage your neck and shoulders. · Take pain medicines exactly as directed. ? If the doctor gave you a prescription medicine for pain, take it as prescribed. ? If you are not taking a prescription pain medicine, ask your doctor if you can take an over-the-counter medicine. · Be careful not to take pain medicine more often than the instructions allow, because you may get worse or more frequent headaches when the medicine wears off.   · Do not ignore new symptoms that occur with a headache, such as a fever, weakness or numbness, vision changes, or confusion. These may be signs of a more serious problem. To prevent headaches  · Keep a headache diary so you can figure out what triggers your headaches. Avoiding triggers may help you prevent headaches. Record when each headache began, how long it lasted, and what the pain was like (throbbing, aching, stabbing, or dull). Write down any other symptoms you had with the headache, such as nausea, flashing lights or dark spots, or sensitivity to bright light or loud noise. Note if the headache occurred near your period. List anything that might have triggered the headache, such as certain foods (chocolate, cheese, wine) or odors, smoke, bright light, stress, or lack of sleep. · Find healthy ways to deal with stress. Headaches are most common during or right after stressful times. Take time to relax before and after you do something that has caused a headache in the past.  · Try to keep your muscles relaxed by keeping good posture. Check your jaw, face, neck, and shoulder muscles for tension, and try relaxing them. When sitting at a desk, change positions often, and stretch for 30 seconds each hour. · Get plenty of sleep and exercise. · Eat regularly and well. Long periods without food can trigger a headache. · Treat yourself to a massage. Some people find that regular massages are very helpful in relieving tension. · Limit caffeine by not drinking too much coffee, tea, or soda. But don't quit caffeine suddenly, because that can also give you headaches. · Reduce eyestrain from computers by blinking frequently and looking away from the computer screen every so often. Make sure you have proper eyewear and that your monitor is set up properly, about an arm's length away. · Seek help if you have depression or anxiety. Your headaches may be linked to these conditions.  Treatment can both prevent headaches and help with symptoms of anxiety or depression. When should you call for help? Call 911 anytime you think you may need emergency care. For example, call if:    · You have signs of a stroke. These may include:  ? Sudden numbness, paralysis, or weakness in your face, arm, or leg, especially on only one side of your body. ? Sudden vision changes. ? Sudden trouble speaking. ? Sudden confusion or trouble understanding simple statements. ? Sudden problems with walking or balance. ? A sudden, severe headache that is different from past headaches.    Call your doctor now or seek immediate medical care if:    · You have a new or worse headache.     · Your headache gets much worse. Where can you learn more? Go to http://shannan-rose marie.info/. Enter M271 in the search box to learn more about \"Headache: Care Instructions. \"  Current as of: Alejandra 3, 2018  Content Version: 11.9  © 9995-6228 [employer]. Care instructions adapted under license by [employer] (which disclaims liability or warranty for this information). If you have questions about a medical condition or this instruction, always ask your healthcare professional. [place] [address] any warranty or liability for your use of this information.

## 2019-07-19 RX ORDER — CELECOXIB 200 MG/1
CAPSULE ORAL
Qty: 30 CAP | Refills: 1 | Status: SHIPPED | OUTPATIENT
Start: 2019-07-19 | End: 2019-09-19 | Stop reason: SDUPTHER

## 2019-08-06 ENCOUNTER — OFFICE VISIT (OUTPATIENT)
Dept: INTERNAL MEDICINE CLINIC | Age: 64
End: 2019-08-06

## 2019-08-06 VITALS
DIASTOLIC BLOOD PRESSURE: 60 MMHG | BODY MASS INDEX: 32.1 KG/M2 | SYSTOLIC BLOOD PRESSURE: 90 MMHG | HEART RATE: 72 BPM | RESPIRATION RATE: 18 BRPM | HEIGHT: 61 IN | OXYGEN SATURATION: 94 % | WEIGHT: 170 LBS | TEMPERATURE: 98.1 F

## 2019-08-06 DIAGNOSIS — E78.00 HYPERCHOLESTEROLEMIA: ICD-10-CM

## 2019-08-06 DIAGNOSIS — M48.062 SPINAL STENOSIS, LUMBAR REGION WITH NEUROGENIC CLAUDICATION: ICD-10-CM

## 2019-08-06 DIAGNOSIS — I10 ESSENTIAL HYPERTENSION, BENIGN: Primary | ICD-10-CM

## 2019-08-06 DIAGNOSIS — M53.9 MULTILEVEL DEGENERATIVE DISC DISEASE: ICD-10-CM

## 2019-08-06 DIAGNOSIS — E11.9 CONTROLLED TYPE 2 DIABETES MELLITUS WITHOUT COMPLICATION, WITHOUT LONG-TERM CURRENT USE OF INSULIN (HCC): ICD-10-CM

## 2019-08-06 LAB
CHOLEST SERPL-MCNC: 302 MG/DL
GLUCOSE POC: 133 MG/DL
HBA1C MFR BLD HPLC: 7.6 %
HDLC SERPL-MCNC: 27 MG/DL
LDL CHOLESTEROL POC: 248 MG/DL
NON-HDL GOAL (POC): 275
TCHOL/HDL RATIO (POC): 11.1
TRIGL SERPL-MCNC: 134 MG/DL

## 2019-08-06 RX ORDER — SUMATRIPTAN 100 MG/1
100 TABLET, FILM COATED ORAL
Qty: 8 TAB | Refills: 0 | Status: SHIPPED | OUTPATIENT
Start: 2019-08-06 | End: 2019-08-06

## 2019-08-06 RX ORDER — RANITIDINE 300 MG/1
300 TABLET ORAL
Qty: 30 TAB | Refills: 12 | Status: SHIPPED | OUTPATIENT
Start: 2019-08-06 | End: 2020-05-04 | Stop reason: ALTCHOICE

## 2019-08-06 RX ORDER — SUMATRIPTAN 100 MG/1
100 TABLET, FILM COATED ORAL
Qty: 8 TAB | Refills: 3 | Status: SHIPPED | OUTPATIENT
Start: 2019-08-06 | End: 2020-07-23 | Stop reason: SDUPTHER

## 2019-08-06 NOTE — PATIENT INSTRUCTIONS
[employer] Activation    Thank you for requesting access to [employer]. Please follow the instructions below to securely access and download your online medical record. [employer] allows you to send messages to your doctor, view your test results, renew your prescriptions, schedule appointments, and more. How Do I Sign Up? 1. In your internet browser, go to [email or web address]  2. Click on the First Time User? Click Here link in the Sign In box. You will be redirect to the New Member Sign Up page. 3. Enter your [employer] Access Code exactly as it appears below. You will not need to use this code after youve completed the sign-up process. If you do not sign up before the expiration date, you must request a new code. [employer] Access Code: Activation code not generated  Current [employer] Status: Active (This is the date your [employer] access code will )    4. Enter the last four digits of your Social Security Number (xxxx) and Date of Birth (mm/dd/yyyy) as indicated and click Submit. You will be taken to the next sign-up page. 5. Create a [employer] ID. This will be your [employer] login ID and cannot be changed, so think of one that is secure and easy to remember. 6. Create a [employer] password. You can change your password at any time. 7. Enter your Password Reset Question and Answer. This can be used at a later time if you forget your password. 8. Enter your e-mail address. You will receive e-mail notification when new information is available in [address] 9. Click Sign Up. You can now view and download portions of your medical record. 10. Click the Download Summary menu link to download a portable copy of your medical information. Additional Information    If you have questions, please visit the Frequently Asked Questions section of the [employer] website at [email or web address] Remember, [employer] is NOT to be used for urgent needs. For medical emergencies, dial 911.

## 2019-08-06 NOTE — PROGRESS NOTES
Nicci Husain is a 61 y.o. female and presents with Generalized Body Aches; Diabetes; and Hospital Follow Up  . Subjective:  Headache  Patient complains of headache. She does not have a headache at this time. She has been taking Fioricet    Description of Headaches:  Location of pain: frontal to occipital region  Radiation of pain?:none  Character of pain:aching, dull  Severity of pain: 5/10  Accompanying symptoms: none  Prodromal sx?: none  Rapidity of onset: sudden  Typical duration of individual headache: a few hours  Are most headaches similar in presentation? yes  Typical precipitants:unknown  Temporal Pattern of Headaches:  Started having HAs a few years ago  Worst time of day: evening  Awaken from sleep?: no  Seasonal pattern?: no  Clustering of HAs over time? no  Overall pattern since problem began: gradually worsening    Degree of Functional Impairment: moderate    Current Use of Meds to Treat HA:  Abortive meds? none  Daily use? no  Prophylactic meds? none    Additional Relevant History:  History of head/neck trauma? no  History of head/neck surgery? no  Family h/o headache problems? no  Use of meds that might worsen HAs? no  Exposure to carbon monoxide? no  Substance use: none      Back Pain Review:  Patient presents for evaluation of low back problems. Symptoms have been present for months and include pain in lower back (dull, mild in character; 8/10 in severity). Initial inciting event: unknown. Symptoms are worst: at times. Alleviating factors identifiable by patient are lying flat, medication . Exacerbating factors identifiable by patient are bending forwards, bending backwards. Treatments so far initiated by patient: medication Previous lower back problems: reported. Previous workup: mri. She still has an intermittent pain radiating down the rt.lower leg./  She  Still has  pains on walking in both legs. Hypertension Review:  The patient has hypertension .   Diet and Lifestyle: generally follows a low sodium diet, exercises sporadically  Home BP Monitoring: is not measured at home. Pertinent ROS: taking medications as instructed, no medication side effects noted, no TIA's, no chest pain on exertion, no dyspnea on exertion, no swelling of ankles. Dyslipidemia Review:  Patient presents for evaluation of lipids. Compliance with treatment thus far has been excellent. A repeat fasting lipid profile was done. The patient does not use medications that may worsen dyslipidemias The patient exercises sporadically. Diabetes Mellitus Review:  She has diabetes mellitus. Diabetic ROS - medication compliance: compliant all of the time, diabetic diet compliance: compliant all of the time, home glucose monitoring: is performed. Known diabetic complications: none  Cardiovascular risk factors: family history, dyslipidemia, diabetes mellitus, obesity, hypertension  Current diabetic medications include oral agents/insulin   Eye exam current (within one year): no  Weight trend: stable  Prior visit with dietician: no  Current diet: \"healthy\" diet  in general  Current exercise: walking  Current monitoring regimen: home blood tests - daily  Home blood sugar records: trend: stable  Any episodes of hypoglycemia? no  Is She on ACE inhibitor or angiotensin II receptor blocker? Yes   Lab review: orders written for new lab studies as appropriate; see orders. Shoulder Pain Review:  Patient complains of right side shoulder pains are intermittent. Review of Systems  Constitutional: negative for fevers, chills, anorexia and weight loss  Eyes:   negative for visual disturbance and irritation  ENT:   negative for tinnitus,sore throat,nasal congestion,ear pains. hoarseness  Respiratory:  negative for cough, hemoptysis, dyspnea,wheezing  CV:   negative for chest pain, palpitations, lower extremity edema  GI:   negative for nausea, vomiting, diarrhea, abdominal pain,melena  Endo:               negative for polyuria,polydipsia,polyphagia,heat intolerance  Genitourinary: negative for frequency, dysuria and hematuria  Integument:  negative for rash and pruritus  Hematologic:  negative for easy bruising and gum/nose bleeding  Musculoskel:  myalgias, arthralgias, back pain, muscle weakness, joint pain  Neurological:  negative for headaches, dizziness, vertigo, memory problems and gait   Behavl/Psych: negative for feelings of anxiety, depression, mood changes    Past Medical History:   Diagnosis Date    Acute cholecystitis 2/9/2011    Acute cystitis 2/9/2011    Cholecystitis 2/9/2011    Cholelithiasis 2/9/2011    Chronic Pain 2/9/2011    Essential hypertension, benign 2/9/2011    GERD (gastroesophageal reflux disease)     GERD, Gastro- Esophageal Reflux Diease (acid reflux) 2/9/2011    Herniated nucleus pulposus 2/9/2011    Hypercholesteremia 2/9/2011    Hypercholesteremia 2/9/2011    Hypertension     Neurogenic bladder, NOS 2/9/2011     Past Surgical History:   Procedure Laterality Date    HX GYN      HX HAMMER TOE REPAIR  6/21/2016    left foot      Social History     Socioeconomic History    Marital status:      Spouse name: Not on file    Number of children: Not on file    Years of education: Not on file    Highest education level: Not on file   Tobacco Use    Smoking status: Never Smoker    Smokeless tobacco: Never Used   Substance and Sexual Activity    Alcohol use: No    Drug use: No    Sexual activity: Yes     Partners: Male     Family History   Problem Relation Age of Onset    Heart Disease Mother     Cancer Father      Current Outpatient Medications   Medication Sig Dispense Refill    SUMAtriptan (IMITREX) 100 mg tablet Take 1 Tab by mouth once as needed for Migraine for up to 1 dose. 8 Tab 3    SUMAtriptan (IMITREX) 100 mg tablet Take 1 Tab by mouth once as needed for Migraine for up to 1 dose. 8 Tab 0    raNITIdine (ZANTAC) 300 mg tab Take 1 Tab by mouth nightly.  30 Tab 12  celecoxib (CELEBREX) 200 mg capsule TAKE 1 CAPSULE BY MOUTH EVERY DAY 30 Cap 1    butalbital-acetaminophen-caff (FIORICET) -40 mg per capsule Take 1 Cap by mouth every four (4) hours as needed for Pain. Indications: Tension Headache 20 Cap 0    losartan (COZAAR) 25 mg tablet TAKE 1 TABLET BY MOUTH EVERY DAY 90 Tab 0    LYRICA 150 mg capsule TAKE 1 TABLET BY MOUTH TWICE A DAY 60 Cap 3    fenofibrate micronized (LOFIBRA) 134 mg capsule TAKE ONE CAPSULE BY MOUTH EVERY DAY 30 Cap 6    metFORMIN (GLUCOPHAGE) 1,000 mg tablet Take 1 Tab by mouth two (2) times a day. 60 Tab 12    SITagliptin (JANUVIA) 100 mg tablet TAKE 1 TABLET EVERY DAY. 30 Tab 12    pantoprazole (PROTONIX) 40 mg tablet TAKE ONE TABLET BY MOUTH TWICE A DAY 60 Tab 12    meloxicam (MOBIC) 15 mg tablet Take 1 Tab by mouth daily. 30 Tab 12    fluticasone (FLONASE) 50 mcg/actuation nasal spray 2 Sprays by Both Nostrils route daily. 1 Bottle 12    cetirizine (ZYRTEC) 10 mg tablet Take 1 Tab by mouth daily. 30 Tab 3    irbesartan (AVAPRO) 150 mg tablet TAKE ONE TABLET EVERY NIGHT AT BEDTIME. 30 Tab 12    ipratropium (ATROVENT) 0.06 % nasal spray 2 Sprays by Both Nostrils route four (4) times daily. 15 mL 0    aspirin 81 mg tablet Take  by mouth.  fluticasone (FLONASE) 50 mcg/actuation nasal spray 2 Sprays by Both Nostrils route daily. 1 Bottle 12    esomeprazole (NEXIUM) 40 mg capsule TAKE ONE (1) CAPSULE TWICE DAILY. 60 Cap 12    LORazepam (ATIVAN) 1 mg tablet Take 1 Tab by mouth every four (4) hours as needed for Anxiety. Max Daily Amount: 6 mg. 4 Tab 0    diphenoxylate-atropine (LOMOTIL) 2.5-0.025 mg per tablet Take 1 tablet by mouth four (4) times daily as needed for Diarrhea.  60 tablet 0     Allergies   Allergen Reactions    Diclofenac Itching       Objective:  Visit Vitals  BP 90/60 (BP 1 Location: Right arm, BP Patient Position: Sitting)   Pulse 72   Temp 98.1 °F (36.7 °C) (Oral)   Resp 18   Ht 5' 1\" (1.549 m)   Wt 170 lb (77.1 kg)   SpO2 94%   BMI 32.12 kg/m²     Physical Exam:   General appearance - alert, well appearing, and in mild distress  Mental status - alert, oriented to person, place, and time  EYE-LAURI, EOMI, corneas normal, no foreign bodies  ENT-ENT exam normal, no neck nodes or sinus tenderness  Nose - normal and patent, no erythema, discharge or polyps  Mouth - mucous membranes moist, pharynx normal without lesions  Neck - supple, no significant adenopathy   Chest - clear to auscultation, no wheezes, rales or rhonchi, symmetric air entry   Heart - normal rate, regular rhythm, normal S1, S2, no murmurs, rubs, clicks or gallops   Abdomen - soft, nontender, nondistended, no masses or organomegaly  Lymph- no adenopathy palpable  Ext-peripheral pulses normal, no pedal edema, no clubbing or cyanosis  Skin-Warm and dry. no hyperpigmentation, vitiligo, or suspicious lesions  Neuro -alert, oriented, normal speech, no focal findings or movement disorder noted  Neck-normal C-spine, no tenderness, full ROM without pain  Feet-no nail deformities or callus formation with good pulses noted  Rt.shoulder-reduced range of motion of rt, subdeltoid tenderness, positive impingement signs  Back-tenderness lower lumbar spine and sacral spine noted,forward flexion,hyperextension impaired,negative straight leg raise      Results for orders placed or performed in visit on 08/06/19   AMB POC GLUCOSE BLOOD, BY GLUCOSE MONITORING DEVICE   Result Value Ref Range    Glucose  mg/dL   AMB POC HEMOGLOBIN A1C   Result Value Ref Range    Hemoglobin A1c (POC) 7.6 %   AMB POC LIPID PROFILE   Result Value Ref Range    Cholesterol (POC) 302     Triglycerides (POC) 134     HDL Cholesterol (POC) 27     LDL Cholesterol (POC) 248 MG/DL    Non-HDL Goal (POC) 275     TChol/HDL Ratio (POC) 11.1        Assessment/Plan:    ICD-10-CM ICD-9-CM    1. Essential hypertension, benign I10 401.1    2.  Controlled type 2 diabetes mellitus without complication, without long-term current use of insulin (HCC) E11.9 250.00 AMB POC GLUCOSE BLOOD, BY GLUCOSE MONITORING DEVICE      AMB POC HEMOGLOBIN A1C   3. Spinal stenosis, lumbar region with neurogenic claudication M48.062 724.03    4. Multilevel degenerative disc disease M53.9 722.6    5. Hypercholesterolemia E78.00 272.0 AMB POC LIPID PROFILE     Orders Placed This Encounter    AMB POC GLUCOSE BLOOD, BY GLUCOSE MONITORING DEVICE    AMB POC HEMOGLOBIN A1C    AMB POC LIPID PROFILE    SUMAtriptan (IMITREX) 100 mg tablet     Sig: Take 1 Tab by mouth once as needed for Migraine for up to 1 dose. Dispense:  8 Tab     Refill:  3    SUMAtriptan (IMITREX) 100 mg tablet     Sig: Take 1 Tab by mouth once as needed for Migraine for up to 1 dose. Dispense:  8 Tab     Refill:  0    raNITIdine (ZANTAC) 300 mg tab     Sig: Take 1 Tab by mouth nightly. Dispense:  30 Tab     Refill:  12     lose weight, increase physical activity, continue present diet with no restrictions, follow low fat diet, follow low salt diet,Take 81mg aspirin daily                        There are no Patient Instructions on file for this visit. I have reviewed with the patient details of the assessment and plan and all questions were answered. Relevent patient education was performed. The most recent lab findings were reviewed with the patient. An After Visit Summary was printed and given to the patient.

## 2019-08-19 DIAGNOSIS — G51.9 FACIAL NEUROPATHY: ICD-10-CM

## 2019-09-11 DIAGNOSIS — E11.9 CONTROLLED TYPE 2 DIABETES MELLITUS WITHOUT COMPLICATION, WITHOUT LONG-TERM CURRENT USE OF INSULIN (HCC): ICD-10-CM

## 2019-09-11 DIAGNOSIS — E78.00 HYPERCHOLESTEREMIA: ICD-10-CM

## 2019-09-11 DIAGNOSIS — I10 ESSENTIAL HYPERTENSION, BENIGN: ICD-10-CM

## 2019-09-12 RX ORDER — LOSARTAN POTASSIUM 25 MG/1
TABLET ORAL
Qty: 30 TAB | Refills: 2 | Status: SHIPPED | OUTPATIENT
Start: 2019-09-12 | End: 2019-12-16 | Stop reason: SDUPTHER

## 2019-09-23 DIAGNOSIS — E11.9 CONTROLLED TYPE 2 DIABETES MELLITUS WITHOUT COMPLICATION, WITHOUT LONG-TERM CURRENT USE OF INSULIN (HCC): ICD-10-CM

## 2019-09-24 RX ORDER — PREGABALIN 150 MG/1
CAPSULE ORAL
Qty: 60 CAP | Refills: 3 | Status: SHIPPED | OUTPATIENT
Start: 2019-09-24 | End: 2019-12-16 | Stop reason: SDUPTHER

## 2019-09-24 RX ORDER — FENOFIBRATE 134 MG/1
CAPSULE ORAL
Qty: 30 CAP | Refills: 6 | Status: SHIPPED | OUTPATIENT
Start: 2019-09-24 | End: 2020-07-02

## 2019-09-24 RX ORDER — CELECOXIB 200 MG/1
CAPSULE ORAL
Qty: 30 CAP | Refills: 1 | Status: SHIPPED | OUTPATIENT
Start: 2019-09-24 | End: 2019-11-06 | Stop reason: SDUPTHER

## 2019-10-25 ENCOUNTER — HOSPITAL ENCOUNTER (EMERGENCY)
Age: 64
Discharge: HOME | End: 2019-10-25
Attending: EMERGENCY MEDICINE
Payer: MEDICAID

## 2019-10-25 ENCOUNTER — APPOINTMENT (OUTPATIENT)
Dept: GENERAL RADIOLOGY | Age: 64
End: 2019-10-25
Attending: PHYSICIAN ASSISTANT
Payer: MEDICAID

## 2019-10-25 VITALS
SYSTOLIC BLOOD PRESSURE: 109 MMHG | WEIGHT: 160 LBS | HEART RATE: 81 BPM | BODY MASS INDEX: 30.21 KG/M2 | OXYGEN SATURATION: 97 % | HEIGHT: 61 IN | DIASTOLIC BLOOD PRESSURE: 80 MMHG | TEMPERATURE: 97.9 F | RESPIRATION RATE: 16 BRPM

## 2019-10-25 DIAGNOSIS — M25.552 LEFT HIP PAIN: Primary | ICD-10-CM

## 2019-10-25 PROCEDURE — 73502 X-RAY EXAM HIP UNI 2-3 VIEWS: CPT

## 2019-10-25 PROCEDURE — 74011250637 HC RX REV CODE- 250/637: Performed by: PHYSICIAN ASSISTANT

## 2019-10-25 PROCEDURE — 99283 EMERGENCY DEPT VISIT LOW MDM: CPT

## 2019-10-25 RX ORDER — HYDROCODONE BITARTRATE AND ACETAMINOPHEN 5; 325 MG/1; MG/1
1 TABLET ORAL
Qty: 10 TAB | Refills: 0 | Status: SHIPPED | OUTPATIENT
Start: 2019-10-25 | End: 2019-10-28

## 2019-10-25 RX ORDER — HYDROCODONE BITARTRATE AND ACETAMINOPHEN 5; 325 MG/1; MG/1
1 TABLET ORAL
Status: COMPLETED | OUTPATIENT
Start: 2019-10-25 | End: 2019-10-25

## 2019-10-25 RX ADMIN — HYDROCODONE BITARTRATE AND ACETAMINOPHEN 1 TABLET: 5; 325 TABLET ORAL at 15:42

## 2019-10-25 NOTE — ED NOTES
Emergency Department Nursing Plan of Care       The Nursing Plan of Care is developed from the Nursing assessment and Emergency Department Attending provider initial evaluation. The plan of care may be reviewed in the ED Provider note. The Plan of Care was developed with the following considerations:   Patient / Family readiness to learn indicated by:verbalized understanding and successful return demonstration  Persons(s) to be included in education: patient  Barriers to Learning/Limitations:No    Signed     [address] Selina Gloria RN    10/25/2019   2:50 PM      Patient is alert and oriented x 4 and in no acute distress at this time. Respirations are at a regular rate, depth, and pattern. Patient updated on plan of care and has no questions or concerns at this time. Call bell within reach. Will continue to monitor. Please reference nursing assessment.

## 2019-10-25 NOTE — LETTER
[facility] EMERGENCY DEPT 
[address] [address] 
[phone] Work/School Note Date: 10/25/2019 To Whom It May concern: Viraj Newsome was seen and treated today in the emergency room by the following provider(s): 
Attending Provider: Elham Hong MD 
Physician Assistant: Paul Franco, [address] [facility]. Viraj Newsome may return to work in 2 days Sincerely, 
 
 
 
 
DUNCAN Swanson

## 2019-10-25 NOTE — DISCHARGE INSTRUCTIONS
Thank you for allowing us to take care of you today! We hope we addressed all of your concerns and needs. We strive to provide excellent quality care in the Emergency Department. You will receive a survey after your visit to evaluate the care you were provided. Should you receive a survey from us, we invite you to share your experience and tell us what made it excellent. It was a pleasure serving you, we invite you to share your experience with us, in our pursuit for excellence, should you be selected to receive a survey. The exam and treatment you received in the Emergency Department were for an urgent problem and are not intended as complete care. It is important that you follow up with a doctor, nurse practitioner, or physician assistant for ongoing care. If your symptoms become worse or you do not improve as expected and you are unable to reach your usual health care provider, you should return to the Emergency Department. We are available 24 hours a day. Please take your discharge instructions with you when you go to your follow-up appointment. If you have any problem arranging a follow-up appointment, contact the Emergency Department immediately. If a prescription has been provided, please have it filled as soon as possible to prevent a delay in treatment. Read the entire medication instruction sheet provided to you by the pharmacy. If you have any questions or reservations about taking the medication due to side effects or interactions with other medications, please call your primary care physician or contact the ER to speak with the charge nurse. Make an appointment with your family doctor or the physician you were referred to for follow-up of this visit as instructed on your discharge paperwork, as this is mandatory follow-up. Return to the ER if you are unable to be seen or if you are unable to be seen in a timely manner.     If you have any problem arranging the follow-up visit, contact the Emergency Department immediately. I hope you feel better and thank you again for allow us to provide you with excellent care today at [facility]! Warmest regards,    Vickie Carnes PA-C  Emergency Medicine Physician Assistant  [facility]      Vitals:    10/25/19 1438   BP: 109/80   BP 1 Location: Left arm   BP Patient Position: At rest;Sitting   Pulse: 81   Resp: 16   Temp: 97.9 °F (36.6 °C)   SpO2: 97%   Weight: 72.6 kg (160 lb)   Height: 5' 1\" (1.549 m)       No results found for this or any previous visit (from the past 12 hour(s)). XR HIP RT W OR WO PELV 2-3 VWS   Final Result   IMPRESSION: No acute abnormality. Lumbar spondylosis. CT Results  (Last 48 hours)    None            [facility] Departments     For adult and child immunizations, family planning, TB screening, STD testing and women's health services. [facility]: [place] [phone]      [facility] 25   [address] [place] [address]   [address]   170 [facility]: [facility] 200 [facility] [phone] 2400 [facility]          [address] [address]     For primary care services, woman and child wellness, and some clinics providing specialty care. [facility] -- [address] [place] [address] [phone]   411 [facility] 200 [facility] [address] [place] [address] [phone]   [address] [place] [address] [address] [place] [address] [phone] [address] [place] [address]  [phone]   [address] [place] [address]  [address] [place] [phone]   [facility] 81 [facility] [phone]   [facility] [address] [place] [address] [phone]   [facility]: [facility] 700 Rajeev, ext [address] [place] [address], [address] [facility] Jeremy [address]  [phone]   [employer] [facility]  [address], [address] ([email or web address]) [phone]         Sexual Health/Woman Wellness Clinics    For STD/HIV testing and treatment, pregnancy testing and services, men's health, birth control services, LGBT services, and hepatitis/HPV vaccine services. [facility] [place] [employer] 201 N. [facility] 75 [facility] 1579 [address] [facility] [address] [phone]   [facility] [address], 5th floor [phone]   Pregnancy [facility] [address] for Women 118 [address]  [facility] [phone] 6847 N [facility] [address] [place] [address]   [phone]   [place]   [phone]   Women, Infant and Children's Services: [address] [phone]       [address] [place] [address] Crisis Intervention   [phone]   [facility] [address]   [phone]   [facility]     [phone]   [address] Crisis   [phone]   [facility] [phone]       Local Primary Care Physicians  [address] [facility] [phone]  Carmell Kussmaul, MD Alysia Havens, MD Wilmon Rummer, MD [facility] [phone]  [facility], [facility]  MD Bridger Nye MD Sharry Brunet, MD [address] [facility] [phone]  MD Misha Jiang MD [address] [facility] [phone]  MD Penny Alejandra MD Roanne Britain, MD Nichola Campion, MD   [facility][1] [phone]  PKYZ MD Justin SAMAYOA MD Vashti Mcalpine, NP 1165 [phone]  MD Dion Reinoso MD Marin Hench Simona Baumann, MD Priti Amezquita, MD Abigail Pelayo MD   33 57 [facility]  Bentley Back MD [facility] [phone]  MD Jackie Steele, JANI Whatley, MD Fatuma Smallwood, MD Hector De Dios, MD James Martinez, MD Le Tejeda MD   651 [address] [facility] [phone]  MD Miguel Sierra, FNP  Luke Chaves, JANI Narayan, MD Shiloh Corrigan, MD Jeff Jovel, MD [facility] [phone]  Ronak Chung, MD Amber Wong, MD Siri Soria, MD Marifer Hitchcock, MD Indio Quiros MD   [address] [phone]  Catalina Roberts, MD Mary Jo Zamora MD [facility] [phone]  MD Michelle Zamorano, MD Sarah Florence MD   [facility] [phone]  Dylan Hope, MD Lindsey Pressley, MD Lilian Leblanc, MD Luis Eduardo Schaefer, MD Bobbi Petit, JANI Mendez MD 1619  66   [phone]  MD Ratna Michaels MD Leonette Asters, MD   2102 [facility] [phone]  [facility] Subhash, MD Dorothy Kanner, P  LUIS East, FNP  LUIS Rasmussen MD Margart Sleek, JANI Gonzalez,  Miscellaneous:  [employer] [place] -[phone]       Patient Education        Hip Pain: Care Instructions  Your Care Instructions    Hip pain may be caused by many things, including overuse, a fall, or a twisting movement. Another cause of hip pain is arthritis. Your pain may increase when you stand up, walk, or squat. The pain may come and go or may be constant. Home treatment can help relieve hip pain, swelling, and stiffness. If your pain is ongoing, you may need more tests and treatment. Follow-up care is a key part of your treatment and safety.  Be sure to make and go to all appointments, and call your doctor if you are having problems. It's also a good idea to know your test results and keep a list of the medicines you take. How can you care for yourself at home? · Take pain medicines exactly as directed. ? If the doctor gave you a prescription medicine for pain, take it as prescribed. ? If you are not taking a prescription pain medicine, ask your doctor if you can take an over-the-counter medicine. · Rest and protect your hip. Take a break from any activity, including standing or walking, that may cause pain. · Put ice or a cold pack against your hip for 10 to 20 minutes at a time. Try to do this every 1 to 2 hours for the next 3 days (when you are awake) or until the swelling goes down. Put a thin cloth between the ice and your skin. · Sleep on your healthy side with a pillow between your knees, or sleep on your back with pillows under your knees. · If there is no swelling, you can put moist heat, a heating pad, or a warm cloth on your hip. Do gentle stretching exercises to help keep your hip flexible. · Learn how to prevent falls. Have your vision and hearing checked regularly. Wear slippers or shoes with a nonskid sole. · Stay at a healthy weight. · Wear comfortable shoes. When should you call for help? Call 911 anytime you think you may need emergency care. For example, call if:    · You have sudden chest pain and shortness of breath, or you cough up blood.     · You are not able to stand or walk or bear weight.     · Your buttocks, legs, or feet feel numb or tingly.     · Your leg or foot is cool or pale or changes color.     · You have severe pain.    Call your doctor now or seek immediate medical care if:    · You have signs of infection, such as:  ? Increased pain, swelling, warmth, or redness in the hip area. ? Red streaks leading from the hip area. ? Pus draining from the hip area.   ? A fever.     · You have signs of a blood clot, such as:  ? Pain in your calf, back of the knee, thigh, or groin. ? Redness and swelling in your leg or groin.     · You are not able to bend, straighten, or move your leg normally.     · You have trouble urinating or having bowel movements.    Watch closely for changes in your health, and be sure to contact your doctor if:    · You do not get better as expected. Where can you learn more? Go to http://shannan-rose marie.info/. Enter J511 in the search box to learn more about \"Hip Pain: Care Instructions. \"  Current as of: June 26, 2019  Content Version: 12.2  © 1567-1071 [employer]. Care instructions adapted under license by [employer] (which disclaims liability or warranty for this information). If you have questions about a medical condition or this instruction, always ask your healthcare professional. [place] [address] any warranty or liability for your use of this information. Patient Education   Patient Education        Piriformis Syndrome: Exercises  Introduction  Here are some examples of exercises for you to try. The exercises may be suggested for a condition or for rehabilitation. Start each exercise slowly. Ease off the exercises if you start to have pain. You will be told when to start these exercises and which ones will work best for you. How to do the exercises  Hip rotator stretch    1. Lie on your back with both knees bent and your feet flat on the floor. 2. Put the ankle of your affected leg on your opposite thigh near your knee. 3. Use your hand to gently push your knee (on your affected leg) away from your body until you feel a gentle stretch around your hip. 4. Hold the stretch for 15 to 30 seconds. 5. Repeat 2 to 4 times. 6. Switch legs and repeat steps 1 through 5. Piriformis stretch    1. Lie on your back with your legs straight. 2. Lift your affected leg and bend your knee.  With your opposite hand, reach across your body, and then gently pull your knee toward your opposite shoulder. 3. Hold the stretch for 15 to 30 seconds. 4. Repeat with your other leg. 5. Repeat 2 to 4 times on each side. Lower abdominal strengthening    1. Lie on your back with your knees bent and your feet flat on the floor. 2. Tighten your belly muscles by pulling your belly button in toward your spine. 3. Lift one foot off the floor and bring your knee toward your chest, so that your knee is straight above your hip and your leg is bent like the letter \"L. \"  4. Lift the other knee up to the same position. 5. Lower one leg at a time to the starting position. 6. Keep alternating legs until you have lifted each leg 8 to 12 times. 7. Be sure to keep your belly muscles tight and your back still as you are moving your legs. Be sure to breathe normally. Follow-up care is a key part of your treatment and safety. Be sure to make and go to all appointments, and call your doctor if you are having problems. It's also a good idea to know your test results and keep a list of the medicines you take. Current as of: June 26, 2019  Content Version: 12.2  © 2971-0736 [employer]. Care instructions adapted under license by [employer] (which disclaims liability or warranty for this information). If you have questions about a medical condition or this instruction, always ask your healthcare professional. [place] [address] any warranty or liability for your use of this information. Piriformis Syndrome: Care Instructions  Your Care Instructions    The piriformis muscle is deep under your rear end (buttock). One end of the muscle connects deep inside the pelvic area, and the other end attaches to the top of the thighbone. This muscle can press on the sciatic nerve that runs from your spine down your leg. When this happens, you may have pain, numbness, and tingling in the buttock and down the back of your leg.  This is called piriformis syndrome. The pain may get worse when you sit for a long time or climb stairs. Also, you may be more likely to develop piriformis syndrome if you run or walk often. Your doctor will check for other causes of your pain before treating this syndrome. Treatment may include stretching exercises, massage, and medicine for the pain and swelling. If these do not help, you may get a shot of steroid medicine. Until the pain is gone, you may need to rest the muscle and limit activities like running. Exercises and a change in how you move and sit may be enough to stop the pressure on the nerve. Follow-up care is a key part of your treatment and safety. Be sure to make and go to all appointments, and call your doctor if you are having problems. It's also a good idea to know your test results and keep a list of the medicines you take. How can you care for yourself at home? · If your doctor thinks that strenuous exercise is causing your problem, stop or cut back on activities such as running. You may find swimming to be a good exercise for a while. · Stretch the piriformis muscle. ? Lie on your back. ? Bend one leg at the knee and keep the other leg flat on the ground. ? Raise your bent knee up and then move it across your body. Hold the outside of the knee with the opposite hand. ? Gently pull the knee with your hand toward the opposite shoulder. ? Hold the stretch for at least 15 to 30 seconds. Switch legs. ? Do the stretch several times each day. · Massage the muscle to relieve pressure. ? Sit on the floor. Lean to one side so that the hip on your sore side is off the ground. Put a tennis ball under your buttock on that side. ? As you put weight onto the tennis ball, you may find spots that are especially sore. Move gently so that the tennis ball gently massages each of the sore spots. · Use ice or heat to help reduce pain.  Put ice or a cold pack or a heating pad set on low or a warm cloth on the sore area for 10 to 20 minutes at a time. Put a thin cloth between the ice pack or heating pad and your skin. · Ask your doctor if you can take an over-the-counter pain medicine, such as acetaminophen (Tylenol), ibuprofen (Advil, Motrin), or naproxen (Aleve). Be safe with medicines. Read and follow all instructions on the label. · Have your doctor or a physical therapist watch how you move. You may need physical therapy or special inserts in your shoes (orthotics) to help you move in a way that does not put pressure on your nerves. When should you call for help? Watch closely for changes in your health, and be sure to contact your doctor if:    · You do not feel better after several weeks of home care.     · Your pain gets worse.     · Your leg becomes weak or numb. Where can you learn more? Go to http://shannan-rose marie.info/. Enter H474 in the search box to learn more about \"Piriformis Syndrome: Care Instructions. \"  Current as of: June 26, 2019  Content Version: 12.2  © 0762-6897 [email or web address]. Care instructions adapted under license by [employer] (which disclaims liability or warranty for this information). If you have questions about a medical condition or this instruction, always ask your healthcare professional. [address] any warranty or liability for your use of this information.

## 2019-10-25 NOTE — ED PROVIDER NOTES
EMERGENCY DEPARTMENT HISTORY AND PHYSICAL EXAM      Date: 10/25/2019  Patient Name: Jyoti Garibay    History of Presenting Illness     HPI: Jyoti Garibay is a 59 y.o. female with past medical history of UTI, chronic pain, herniated disc, neurogenic bladder, hypertension, presents to the emergency room for right hip pain that started earlier today. She reports that it started suddenly when she was moving. She reports that the pain is better with walking, worse with sitting, 6 out of 10, intermittent, sharp, progressively worsening. She denies UTI symptoms, fever/chills, chest pain shortness of breath, nausea vomiting, flank pain, inability to bear weight, trauma, among other associated symptoms. Pertinent social history: None    Pertinent surgical history: None    PCP: Colt Jeffrey MD    Current Outpatient Medications   Medication Sig Dispense Refill    HYDROcodone-acetaminophen (NORCO) 5-325 mg per tablet Take 1 Tab by mouth every four (4) hours as needed for Pain for up to 3 days. Max Daily Amount: 6 Tabs. 10 Tab 0    naloxone 2 mg/actuation spry Use 1 spray intranasally, then discard. Repeat with new spray every 2 min as needed for opioid overdose symptoms, alternating nostrils. 2 Syringe 0    fenofibrate micronized (LOFIBRA) 134 mg capsule TAKE 1 CAPSULE BY MOUTH EVERY DAY 30 Cap 6    celecoxib (CELEBREX) 200 mg capsule TAKE 1 CAPSULE BY MOUTH EVERY DAY 30 Cap 1    pregabalin (LYRICA) 150 mg capsule TAKE 1 CAPSULE BY MOUTH TWICE A DAY 60 Cap 3    losartan (COZAAR) 25 mg tablet TAKE 1 TABLET BY MOUTH EVERY DAY 30 Tab 2    SITagliptin (JANUVIA) 100 mg tablet TAKE 1 TABLET BY MOUTH EVERY DAY 30 Tab 11    raNITIdine (ZANTAC) 300 mg tab Take 1 Tab by mouth nightly. 30 Tab 12    metFORMIN (GLUCOPHAGE) 1,000 mg tablet Take 1 Tab by mouth two (2) times a day.  60 Tab 12    pantoprazole (PROTONIX) 40 mg tablet TAKE ONE TABLET BY MOUTH TWICE A DAY 60 Tab 12    meloxicam (MOBIC) 15 mg tablet Take 1 Tab by mouth daily. 30 Tab 12    fluticasone (FLONASE) 50 mcg/actuation nasal spray 2 Sprays by Both Nostrils route daily. 1 Bottle 12    cetirizine (ZYRTEC) 10 mg tablet Take 1 Tab by mouth daily. 30 Tab 3    irbesartan (AVAPRO) 150 mg tablet TAKE ONE TABLET EVERY NIGHT AT BEDTIME. 30 Tab 12    ipratropium (ATROVENT) 0.06 % nasal spray 2 Sprays by Both Nostrils route four (4) times daily. 15 mL 0    diphenoxylate-atropine (LOMOTIL) 2.5-0.025 mg per tablet Take 1 tablet by mouth four (4) times daily as needed for Diarrhea. 60 tablet 0    aspirin 81 mg tablet Take  by mouth. Past History     Past Medical History:  Past Medical History:   Diagnosis Date    Acute cholecystitis 2/9/2011    Acute cystitis 2/9/2011    Cholecystitis 2/9/2011    Cholelithiasis 2/9/2011    Chronic Pain 2/9/2011    Essential hypertension, benign 2/9/2011    GERD (gastroesophageal reflux disease)     GERD, Gastro- Esophageal Reflux Diease (acid reflux) 2/9/2011    Herniated nucleus pulposus 2/9/2011    Hypercholesteremia 2/9/2011    Hypercholesteremia 2/9/2011    Hypertension     Neurogenic bladder, NOS 2/9/2011       Past Surgical History:  Past Surgical History:   Procedure Laterality Date    HX GYN      HX HAMMER TOE REPAIR  6/21/2016    left foot        Family History:  Family History   Problem Relation Age of Onset    Heart Disease Mother     Cancer Father        Social History:  Social History     Tobacco Use    Smoking status: Never Smoker    Smokeless tobacco: Never Used   Substance Use Topics    Alcohol use: No    Drug use: No       Allergies: Allergies   Allergen Reactions    Diclofenac Itching         Review of Systems   Review of Systems   Constitutional: Negative for chills and fever. Respiratory: Negative for shortness of breath. Cardiovascular: Negative for chest pain. Gastrointestinal: Negative for abdominal pain, nausea and vomiting. Genitourinary: Negative for frequency and urgency. Musculoskeletal: Positive for arthralgias. Negative for back pain and neck pain. Neurological: Negative for light-headedness and headaches. Physical Exam     Vitals:    10/25/19 1438   BP: 109/80   Pulse: 81   Resp: 16   Temp: 97.9 °F (36.6 °C)   SpO2: 97%   Weight: 72.6 kg (160 lb)   Height: 5' 1\" (1.549 m)     Physical Exam   Constitutional: She is oriented to person, place, and time. She appears well-developed and well-nourished. No distress. HENT:   Head: Normocephalic and atraumatic. Mouth/Throat: Oropharynx is clear and moist.   Cardiovascular: Normal rate, regular rhythm and normal heart sounds. Pulmonary/Chest: Effort normal and breath sounds normal.   Musculoskeletal:   Lumbar spine and right hip exam: Patient has an antalgic gait. No TTP. Full ROM and no pain w/ passing ROM. No swelling when compared to other extremity. No erythema, signs of skin infection/rash or warmth to the touch. No obvious trauma or deformity. 2+ distal pulses, NVI, sensation grossly intact to light touch. No pitting edema or crepitus. Neurological: She is alert and oriented to person, place, and time. Skin: Skin is warm and dry. She is not diaphoretic. Psychiatric: She has a normal mood and affect. Her behavior is normal. Judgment and thought content normal.   Nursing note and vitals reviewed. Diagnostic Study Results     Labs -   No results found for this or any previous visit (from the past 12 hour(s)). Radiologic Studies -   XR HIP RT W OR WO PELV 2-3 VWS   Final Result   IMPRESSION: No acute abnormality. Lumbar spondylosis. CT Results  (Last 48 hours)    None                Medical Decision Making   I am the first provider for this patient. I reviewed the vital signs, available nursing notes, past medical history, past surgical history, social history    ED Course and Progress notes:   Initial assessment performed.  The patients presenting problems have been discussed, and they are in agreement with the care plan formulated and outlined with them. I have encouraged them to ask questions as they arise throughout their visit. re evaluation pt is resting comfortably, and has no new complaints, changes, or physical findings. The patient has improved and is stable. Procedures:  Procedures    Critical Care Time: none    Vital Signs-Reviewed the patient's vital signs. Vitals:    10/25/19 1438   BP: 109/80   BP 1 Location: Left arm   BP Patient Position: At rest;Sitting   Pulse: 81   Resp: 16   Temp: 97.9 °F (36.6 °C)   SpO2: 97%   Weight: 72.6 kg (160 lb)   Height: 5' 1\" (1.549 m)       Medications Administered During ED Course  Medications   HYDROcodone-acetaminophen (NORCO) 5-325 mg per tablet 1 Tab (1 Tab Oral Given 10/25/19 1542)     Disposition:  D/c home    DISCHARGE NOTE:   I Counseled the patient on diagnosis and care plan. All available lab and imaging results have been reviewed by me and were discussed with the patient, including all incidental findings. The likelihood of other entities in the differential is insufficient to justify any further testing for them. This was explained to the patient. Patient agrees with plan and agrees to follow up with PCP as recommended, or return to the ED immediately if their symptoms worsen. All medications were reviewed with the patient. All of pt's questions and concerns were addressed. The patient was advised that new or worsening symptoms would require further evaluation and should prompt immediate return to the Emergency Department. Discharge instructions have been provided and explained to the patient, along with reasons to return to the ED. Patient voices understanding and is agreeable with the plan for discharge. Patient is ready to go home.     Follow-up Information     Follow up With Specialties Details Why Nacho Loaiza MD Internal Medicine Schedule an appointment as soon as possible for a visit for above diagnosis [address] EMERGENCY DEPT Emergency Medicine Go to If symptoms worsen [address] [facility]  [phone]    [facility]  Schedule an appointment as soon as possible for a visit For left hip pain [address]  [phone]          Discharge Medication List as of 10/25/2019  3:44 PM      START taking these medications    Details   HYDROcodone-acetaminophen (NORCO) 5-325 mg per tablet Take 1 Tab by mouth every four (4) hours as needed for Pain for up to 3 days. Max Daily Amount: 6 Tabs., Print, Disp-10 Tab, R-0      naloxone 2 mg/actuation spry Use 1 spray intranasally, then discard. Repeat with new spray every 2 min as needed for opioid overdose symptoms, alternating nostrils. , Print, Disp-2 Syringe, R-0         CONTINUE these medications which have NOT CHANGED    Details   fenofibrate micronized (LOFIBRA) 134 mg capsule TAKE 1 CAPSULE BY MOUTH EVERY DAY, Normal, Disp-30 Cap, R-6      celecoxib (CELEBREX) 200 mg capsule TAKE 1 CAPSULE BY MOUTH EVERY DAY, Normal, Disp-30 Cap, R-1      pregabalin (LYRICA) 150 mg capsule TAKE 1 CAPSULE BY MOUTH TWICE A DAY, PrintNot to exceed 2 additional fills before 10/07/2019Disp-60 Cap, R-3      losartan (COZAAR) 25 mg tablet TAKE 1 TABLET BY MOUTH EVERY DAY, Normal, Disp-30 Tab, R-2      SITagliptin (JANUVIA) 100 mg tablet TAKE 1 TABLET BY MOUTH EVERY DAY, Normal, Disp-30 Tab, R-11      raNITIdine (ZANTAC) 300 mg tab Take 1 Tab by mouth nightly., Normal, Disp-30 Tab, R-12      metFORMIN (GLUCOPHAGE) 1,000 mg tablet Take 1 Tab by mouth two (2) times a day., Normal, Disp-60 Tab, R-12      pantoprazole (PROTONIX) 40 mg tablet TAKE ONE TABLET BY MOUTH TWICE A DAY, Normal, Disp-60 Tab, R-12      meloxicam (MOBIC) 15 mg tablet Take 1 Tab by mouth daily. , Normal, Disp-30 Tab, R-12      fluticasone (FLONASE) 50 mcg/actuation nasal spray 2 Sprays by Both Nostrils route daily., Normal, Disp-1 Bottle, R-12      cetirizine (ZYRTEC) 10 mg tablet Take 1 Tab by mouth daily. , Normal, Disp-30 Tab, R-3      irbesartan (AVAPRO) 150 mg tablet TAKE ONE TABLET EVERY NIGHT AT BEDTIME., Normal, Disp-30 Tab, R-12, JI      ipratropium (ATROVENT) 0.06 % nasal spray 2 Sprays by Both Nostrils route four (4) times daily. , Normal, Disp-15 mL, R-0      diphenoxylate-atropine (LOMOTIL) 2.5-0.025 mg per tablet Take 1 tablet by mouth four (4) times daily as needed for Diarrhea., Print, Disp-60 tablet, R-0      aspirin 81 mg tablet Take  by mouth. Historical Med         STOP taking these medications       butalbital-acetaminophen-caff (FIORICET) -40 mg per capsule Comments:   Reason for Stopping:         esomeprazole (NEXIUM) 40 mg capsule Comments:   Reason for Stopping:         LORazepam (ATIVAN) 1 mg tablet Comments:   Reason for Stopping:               Provider Notes (Medical Decision Making):   DDx hip pain: Hip joint arthritis, trochanteric bursitis, hip fx, sciatica, sacroiliitis, lumbar facet joint arthritis, lumbar disc herniation, ischial bursitis, piriformis syndrome, iliopsoas bursitis, avascular necrosis, hip dislocation. Diagnosis     Clinical Impression:   1. Left hip pain        Please note that this dictation was completed with [employer], the computer voice recognition software. Quite often unanticipated grammatical, syntax, homophones, and other interpretive errors are inadvertently transcribed by the computer software. Please disregard these errors. Please excuse any errors that have escaped final proofreading. This note will not be viewable in [address]

## 2019-11-06 ENCOUNTER — OFFICE VISIT (OUTPATIENT)
Dept: INTERNAL MEDICINE CLINIC | Age: 64
End: 2019-11-06

## 2019-11-06 VITALS
OXYGEN SATURATION: 100 % | HEIGHT: 61 IN | BODY MASS INDEX: 30.58 KG/M2 | WEIGHT: 162 LBS | SYSTOLIC BLOOD PRESSURE: 110 MMHG | TEMPERATURE: 98.6 F | HEART RATE: 89 BPM | RESPIRATION RATE: 17 BRPM | DIASTOLIC BLOOD PRESSURE: 70 MMHG

## 2019-11-06 DIAGNOSIS — I10 ESSENTIAL HYPERTENSION, BENIGN: ICD-10-CM

## 2019-11-06 DIAGNOSIS — E78.00 HYPERCHOLESTEREMIA: ICD-10-CM

## 2019-11-06 DIAGNOSIS — M12.811 ROTATOR CUFF ARTHROPATHY OF RIGHT SHOULDER: ICD-10-CM

## 2019-11-06 DIAGNOSIS — M48.062 SPINAL STENOSIS, LUMBAR REGION WITH NEUROGENIC CLAUDICATION: ICD-10-CM

## 2019-11-06 DIAGNOSIS — E11.9 CONTROLLED TYPE 2 DIABETES MELLITUS WITHOUT COMPLICATION, WITHOUT LONG-TERM CURRENT USE OF INSULIN (HCC): Primary | ICD-10-CM

## 2019-11-06 LAB
CHOLEST SERPL-MCNC: 289 MG/DL
GLUCOSE POC: 157 MG/DL
HBA1C MFR BLD HPLC: 6.2 %
HDLC SERPL-MCNC: 54 MG/DL
LDL CHOLESTEROL POC: 199 MG/DL
NON-HDL GOAL (POC): 235
TCHOL/HDL RATIO (POC): 5.4
TRIGL SERPL-MCNC: 181 MG/DL

## 2019-11-06 RX ORDER — TRIAMCINOLONE ACETONIDE 40 MG/ML
40 INJECTION, SUSPENSION INTRA-ARTICULAR; INTRAMUSCULAR ONCE
Qty: 1 ML | Refills: 0
Start: 2019-11-06 | End: 2019-11-06

## 2019-11-06 RX ORDER — LIDOCAINE HYDROCHLORIDE 20 MG/ML
5 INJECTION, SOLUTION EPIDURAL; INFILTRATION; INTRACAUDAL; PERINEURAL ONCE
Qty: 5 ML | Refills: 0
Start: 2019-11-06 | End: 2019-11-06

## 2019-11-06 RX ORDER — CELECOXIB 200 MG/1
CAPSULE ORAL
Qty: 90 CAP | Refills: 3 | Status: SHIPPED | OUTPATIENT
Start: 2019-11-06 | End: 2020-11-09

## 2019-11-06 NOTE — PROGRESS NOTES
Sherly Snell is a 59 y.o. female and presents with LOW BACK PAIN; Hypertension; and Shoulder Pain  . Subjective:  Shoulder Pain Review:  Patient complains of right side shoulder pain. The symptoms began years ago Course of symptoms since onset has been gradually worsening. Pain is described as overall severity = moderate. Symptoms were incited by no known event. Patient denies N/A. Therapy to date includes OTC analgesics: effective. Hypertension Review:  The patient has essential hypertension  Diet and Lifestyle: generally follows a  low sodium diet, exercises sporadically  Home BP Monitoring: is not measured at home. Pertinent ROS: taking medications as instructed, no medication side effects noted, no TIA's, no chest pain on exertion, no dyspnea on exertion, no swelling of ankles. Back Pain Review:  Patient presents for evaluation of low back problems. Symptoms have been present for several weeks and include pain in lower back (dull, mild in character; 6/10 in severity). Initial inciting event: unknown. Symptoms are worst: at times. Alleviating factors identifiable by patient are lying flat, medication . Exacerbating factors identifiable by patient are bending forwards, bending backwards. Treatments so far initiated by patient: medication Previous lower back problems: reported. Previous workup: none. Review of Systems  Constitutional: negative for fevers, chills, anorexia and weight loss  Eyes:   negative for visual disturbance and irritation  ENT:   negative for tinnitus,sore throat,nasal congestion,ear pains. hoarseness  Respiratory:  negative for cough, hemoptysis, dyspnea,wheezing  CV:   negative for chest pain, palpitations, lower extremity edema  GI:   negative for nausea, vomiting, diarrhea, abdominal pain,melena  Endo:               negative for polyuria,polydipsia,polyphagia,heat intolerance  Genitourinary: negative for frequency, dysuria and hematuria  Integument:  negative for rash and pruritus  Hematologic:  negative for easy bruising and gum/nose bleeding  Musculoskel: myalgias, arthralgias, back pain, muscle weakness, joint pain  Neurological:  negative for headaches, dizziness, vertigo, memory problems and gait   Behavl/Psych: negative for feelings of anxiety, depression, mood changes    Past Medical History:   Diagnosis Date    Acute cholecystitis 2/9/2011    Acute cystitis 2/9/2011    Cholecystitis 2/9/2011    Cholelithiasis 2/9/2011    Chronic Pain 2/9/2011    Essential hypertension, benign 2/9/2011    GERD (gastroesophageal reflux disease)     GERD, Gastro- Esophageal Reflux Diease (acid reflux) 2/9/2011    Herniated nucleus pulposus 2/9/2011    Hypercholesteremia 2/9/2011    Hypercholesteremia 2/9/2011    Hypertension     Neurogenic bladder, NOS 2/9/2011     Past Surgical History:   Procedure Laterality Date    HX GYN      HX HAMMER TOE REPAIR  6/21/2016    left foot      Social History     Socioeconomic History    Marital status:      Spouse name: Not on file    Number of children: Not on file    Years of education: Not on file    Highest education level: Not on file   Tobacco Use    Smoking status: Never Smoker    Smokeless tobacco: Never Used   Substance and Sexual Activity    Alcohol use: No    Drug use: No    Sexual activity: Yes     Partners: Male     Family History   Problem Relation Age of Onset    Heart Disease Mother     Cancer Father      Current Outpatient Medications   Medication Sig Dispense Refill    triamcinolone acetonide (KENALOG) 40 mg/mL injection 1 mL by Intra artICUlar route once for 1 dose. 1 mL 0    lidocaine, PF, (XYLOCAINE) 20 mg/mL (2 %) injection 5 mL by Intra artICUlar route once for 1 dose. Indications: administration of local anesthetic drug 5 mL 0    naloxone 2 mg/actuation spry Use 1 spray intranasally, then discard. Repeat with new spray every 2 min as needed for opioid overdose symptoms, alternating nostrils.  2 Syringe 0    fenofibrate micronized (LOFIBRA) 134 mg capsule TAKE 1 CAPSULE BY MOUTH EVERY DAY 30 Cap 6    celecoxib (CELEBREX) 200 mg capsule TAKE 1 CAPSULE BY MOUTH EVERY DAY 30 Cap 1    pregabalin (LYRICA) 150 mg capsule TAKE 1 CAPSULE BY MOUTH TWICE A DAY 60 Cap 3    losartan (COZAAR) 25 mg tablet TAKE 1 TABLET BY MOUTH EVERY DAY 30 Tab 2    SITagliptin (JANUVIA) 100 mg tablet TAKE 1 TABLET BY MOUTH EVERY DAY 30 Tab 11    raNITIdine (ZANTAC) 300 mg tab Take 1 Tab by mouth nightly. 30 Tab 12    metFORMIN (GLUCOPHAGE) 1,000 mg tablet Take 1 Tab by mouth two (2) times a day. 60 Tab 12    pantoprazole (PROTONIX) 40 mg tablet TAKE ONE TABLET BY MOUTH TWICE A DAY 60 Tab 12    fluticasone (FLONASE) 50 mcg/actuation nasal spray 2 Sprays by Both Nostrils route daily. 1 Bottle 12    cetirizine (ZYRTEC) 10 mg tablet Take 1 Tab by mouth daily. 30 Tab 3    irbesartan (AVAPRO) 150 mg tablet TAKE ONE TABLET EVERY NIGHT AT BEDTIME. 30 Tab 12    ipratropium (ATROVENT) 0.06 % nasal spray 2 Sprays by Both Nostrils route four (4) times daily. 15 mL 0    aspirin 81 mg tablet Take  by mouth.  meloxicam (MOBIC) 15 mg tablet Take 1 Tab by mouth daily. 30 Tab 12    diphenoxylate-atropine (LOMOTIL) 2.5-0.025 mg per tablet Take 1 tablet by mouth four (4) times daily as needed for Diarrhea.  60 tablet 0     Allergies   Allergen Reactions    Diclofenac Itching       Objective:  Visit Vitals  /70 (BP 1 Location: Right arm, BP Patient Position: Sitting)   Pulse 89   Temp 98.6 °F (37 °C) (Oral)   Resp 17   Ht 5' 1\" (1.549 m)   Wt 162 lb (73.5 kg)   SpO2 100%   BMI 30.61 kg/m²     Physical Exam:   General appearance - alert, well appearing, and in no distress  Mental status - alert, oriented to person, place, and time  EYE-LAURI, EOMI, corneas normal, no foreign bodies  ENT-ENT exam normal, no neck nodes or sinus tenderness  Nose - normal and patent, no erythema, discharge or polyps  Mouth - mucous membranes moist, pharynx normal without lesions  Neck - supple, no significant adenopathy   Chest - clear to auscultation, no wheezes, rales or rhonchi, symmetric air entry   Heart - normal rate, regular rhythm, normal S1, S2, no murmurs, rubs, clicks or gallops   Abdomen - soft, nontender, nondistended, no masses or organomegaly  Lymph- no adenopathy palpable  Ext-peripheral pulses normal, no pedal edema, no clubbing or cyanosis  Skin-Warm and dry. no hyperpigmentation, vitiligo, or suspicious lesions  Neuro -alert, oriented, normal speech, no focal findings or movement disorder noted  Neck-normal C-spine, no tenderness, full ROM without pain  Feet-no nail deformities or callus formation with good pulses noted      Results for orders placed or performed in visit on 08/06/19   AMB POC GLUCOSE BLOOD, BY GLUCOSE MONITORING DEVICE   Result Value Ref Range    Glucose  mg/dL   AMB POC HEMOGLOBIN A1C   Result Value Ref Range    Hemoglobin A1c (POC) 7.6 %   AMB POC LIPID PROFILE   Result Value Ref Range    Cholesterol (POC) 302     Triglycerides (POC) 134     HDL Cholesterol (POC) 27     LDL Cholesterol (POC) 248 MG/DL    Non-HDL Goal (POC) 275     TChol/HDL Ratio (POC) 11.1        Assessment/Plan:    ICD-10-CM ICD-9-CM    1. Controlled type 2 diabetes mellitus without complication, without long-term current use of insulin (HCC) E11.9 250.00 AMB POC GLUCOSE BLOOD, BY GLUCOSE MONITORING DEVICE      AMB POC HEMOGLOBIN A1C   2. Hypercholesteremia E78.00 272.0 AMB POC LIPID PROFILE   3. Essential hypertension, benign I10 401.1    4. Spinal stenosis, lumbar region with neurogenic claudication M48.062 724.03    5.  Rotator cuff arthropathy of right shoulder M12.811 716.81 TRIAMCINOLONE ACETONIDE INJ      [place] THER/PROPH/DIAG INJECTION, SUBCUT/IM      LIDOCAINE INJECTION     Orders Placed This Encounter    AMB POC LIPID PROFILE    AMB POC GLUCOSE BLOOD, BY GLUCOSE MONITORING DEVICE    AMB POC HEMOGLOBIN A1C    TRIAMCINOLONE ACETONIDE INJ Order Specific Question:   Charge Quantity? Answer:   4    [place] THER/PROPH/DIAG INJECTION, SUBCUT/IM    LIDOCAINE INJECTION    triamcinolone acetonide (KENALOG) 40 mg/mL injection     Si mL by Intra artICUlar route once for 1 dose. Dispense:  1 mL     Refill:  0    lidocaine, PF, (XYLOCAINE) 20 mg/mL (2 %) injection     Si mL by Intra artICUlar route once for 1 dose. Indications: administration of local anesthetic drug     Dispense:  5 mL     Refill:  0     lose weight, follow low fat diet, follow low salt diet, continue present plan,Take 81mg aspirin daily    Indications:   Symptomatic relief of pain    Procedure:  After consent was obtained, using sterile technique the right shoulder joint was prepped using alcohol. Local anesthetic used: 1% lidocaine. . The joint was entered and Kenalog 40 mg was mixed with 1% lidocaine 3 ml  and injected into the joint and the needle withdrawn. The procedure was well tolerated. The patient is asked to continue to rest the joint for a few more days before resuming regular activities. It may be more painful for the first 1-2 days. Watch for fever, or increased swelling or persistent pain in the joint. Call or return to clinic prn if such symptoms occur or there is failure to improve as anticipated.       [facility]  OFFICE PROCEDURE PROGRESS NOTE        Chart reviewed for the following:   Ben Doss MD, have reviewed the History, Physical and updated the Allergic reactions for [address] performed immediately prior to start of procedure:   Ben Doss MD, have performed the following reviews on Era Ramus prior to the start of the procedure:            * Patient was identified by name and date of birth   * Agreement on procedure being performed was verified  * Risks and Benefits explained to the patient  * Procedure site verified and marked as necessary  * Patient was positioned for comfort       Time: 2:35pm      Date of procedure: 2019    Procedure performed by:  Gabbie Gallegos MD    Patient assisted by: nursing attendant    How tolerated by patient: tolerated the procedure well with no complications    Comments: none                Patient Instructions   MyChart Activation    Thank you for requesting access to [employer]. Please follow the instructions below to securely access and download your online medical record. [employer] allows you to send messages to your doctor, view your test results, renew your prescriptions, schedule appointments, and more. How Do I Sign Up? 1. In your internet browser, go to [email or web address]  2. Click on the First Time User? Click Here link in the Sign In box. You will be redirect to the New Member Sign Up page. 3. Enter your [employer] Access Code exactly as it appears below. You will not need to use this code after youve completed the sign-up process. If you do not sign up before the expiration date, you must request a new code. [employer] Access Code: Activation code not generated  Current [employer] Status: Active (This is the date your [employer] access code will )    4. Enter the last four digits of your Social Security Number (xxxx) and Date of Birth (mm/dd/yyyy) as indicated and click Submit. You will be taken to the next sign-up page. 5. Create a [employer] ID. This will be your [employer] login ID and cannot be changed, so think of one that is secure and easy to remember. 6. Create a [employer] password. You can change your password at any time. 7. Enter your Password Reset Question and Answer. This can be used at a later time if you forget your password. 8. Enter your e-mail address. You will receive e-mail notification when new information is available in [address] 9. Click Sign Up. You can now view and download portions of your medical record.   10. Click the Download Summary menu link to download a portable copy of your medical information. Additional Information    If you have questions, please visit the Frequently Asked Questions section of the [employer] website at [email or web address] Remember, [employer] is NOT to be used for urgent needs. For medical emergencies, dial 911. Follow-up and Dispositions    · Return in about 4 weeks (around 12/4/2019), or if symptoms worsen or fail to improve. I have reviewed with the patient details of the assessment and plan and all questions were answered. Relevent patient education was performed. The most recent lab findings were reviewed with the patient. An After Visit Summary was printed and given to the patient.

## 2019-11-06 NOTE — PATIENT INSTRUCTIONS
[employer] Activation Thank you for requesting access to [employer]. Please follow the instructions below to securely access and download your online medical record. [employer] allows you to send messages to your doctor, view your test results, renew your prescriptions, schedule appointments, and more. How Do I Sign Up? 1. In your internet browser, go to [email or web address] 
2. Click on the First Time User? Click Here link in the Sign In box. You will be redirect to the New Member Sign Up page. 3. Enter your [employer] Access Code exactly as it appears below. You will not need to use this code after youve completed the sign-up process. If you do not sign up before the expiration date, you must request a new code. [employer] Access Code: Activation code not generated Current [facility] Status: Active (This is the date your [employer] access code will ) 4. Enter the last four digits of your Social Security Number (xxxx) and Date of Birth (mm/dd/yyyy) as indicated and click Submit. You will be taken to the next sign-up page. 5. Create a [employer] ID. This will be your [employer] login ID and cannot be changed, so think of one that is secure and easy to remember. 6. Create a [employer] password. You can change your password at any time. 7. Enter your Password Reset Question and Answer. This can be used at a later time if you forget your password. 8. Enter your e-mail address. You will receive e-mail notification when new information is available in [address] 9. Click Sign Up. You can now view and download portions of your medical record. 10. Click the Download Summary menu link to download a portable copy of your medical information. Additional Information If you have questions, please visit the Frequently Asked Questions section of the [employer] website at [email or web address] Remember, [employer] is NOT to be used for urgent needs. For medical emergencies, dial 911.

## 2019-11-06 NOTE — PROGRESS NOTES
Chief Complaint   Patient presents with    LOW BACK PAIN    Hypertension    Shoulder Pain     3 most recent PHQ Screens 11/6/2019   PHQ Not Done Patient Decline   Little interest or pleasure in doing things -   Feeling down, depressed, irritable, or hopeless -   Total Score PHQ 2 -     1. Have you been to the ER, urgent care clinic since your last visit? Hospitalized since your last visit?no    2. Have you seen or consulted any other health care providers outside of the [address] [place] [address] since your last visit? Include any pap smears or colon screening.  no

## 2019-11-15 ENCOUNTER — TELEPHONE (OUTPATIENT)
Dept: INTERNAL MEDICINE CLINIC | Age: 64
End: 2019-11-15

## 2019-11-18 ENCOUNTER — TELEPHONE (OUTPATIENT)
Dept: INTERNAL MEDICINE CLINIC | Age: 64
End: 2019-11-18

## 2019-12-15 DIAGNOSIS — E78.00 HYPERCHOLESTEREMIA: ICD-10-CM

## 2019-12-15 DIAGNOSIS — E11.9 CONTROLLED TYPE 2 DIABETES MELLITUS WITHOUT COMPLICATION, WITHOUT LONG-TERM CURRENT USE OF INSULIN (HCC): ICD-10-CM

## 2019-12-15 DIAGNOSIS — I10 ESSENTIAL HYPERTENSION, BENIGN: ICD-10-CM

## 2019-12-16 DIAGNOSIS — E11.9 CONTROLLED TYPE 2 DIABETES MELLITUS WITHOUT COMPLICATION, WITHOUT LONG-TERM CURRENT USE OF INSULIN (HCC): ICD-10-CM

## 2019-12-16 RX ORDER — LOSARTAN POTASSIUM 25 MG/1
TABLET ORAL
Qty: 30 TAB | Refills: 2 | Status: SHIPPED | OUTPATIENT
Start: 2019-12-16 | End: 2020-03-24

## 2019-12-17 RX ORDER — PREGABALIN 150 MG/1
CAPSULE ORAL
Qty: 60 CAP | Refills: 3 | Status: SHIPPED | OUTPATIENT
Start: 2019-12-17 | End: 2020-05-03

## 2020-02-03 ENCOUNTER — OFFICE VISIT (OUTPATIENT)
Dept: INTERNAL MEDICINE CLINIC | Age: 65
End: 2020-02-03

## 2020-02-03 VITALS
BODY MASS INDEX: 32.28 KG/M2 | DIASTOLIC BLOOD PRESSURE: 66 MMHG | HEIGHT: 61 IN | WEIGHT: 171 LBS | SYSTOLIC BLOOD PRESSURE: 96 MMHG | OXYGEN SATURATION: 93 % | RESPIRATION RATE: 16 BRPM | TEMPERATURE: 98 F | HEART RATE: 94 BPM

## 2020-02-03 DIAGNOSIS — M12.811 ROTATOR CUFF ARTHROPATHY OF RIGHT SHOULDER: ICD-10-CM

## 2020-02-03 DIAGNOSIS — I10 ESSENTIAL HYPERTENSION, BENIGN: ICD-10-CM

## 2020-02-03 DIAGNOSIS — M48.062 SPINAL STENOSIS, LUMBAR REGION WITH NEUROGENIC CLAUDICATION: ICD-10-CM

## 2020-02-03 DIAGNOSIS — E78.00 HYPERCHOLESTEREMIA: ICD-10-CM

## 2020-02-03 DIAGNOSIS — E11.9 CONTROLLED TYPE 2 DIABETES MELLITUS WITHOUT COMPLICATION, WITHOUT LONG-TERM CURRENT USE OF INSULIN (HCC): Primary | ICD-10-CM

## 2020-02-03 LAB
CHOLEST SERPL-MCNC: 187 MG/DL
GLUCOSE POC: 224 MG/DL
HBA1C MFR BLD HPLC: 6.9 %
HDLC SERPL-MCNC: 56 MG/DL
LDL CHOLESTEROL POC: 113 MG/DL
NON-HDL GOAL (POC): 131
TCHOL/HDL RATIO (POC): 3.3
TRIGL SERPL-MCNC: 89 MG/DL

## 2020-02-03 NOTE — PROGRESS NOTES
Yulia Gale is a 59 y.o. female and presents with Diabetes; Cholesterol Problem; and Hypertension  . Subjective:  Shoulder Pain Review:  Patient complaints of right side shoulder pains continue. The symptoms began years ago Course of symptoms since onset has been gradually worsening. Pain is described as overall severity = moderate. Symptoms were incited by no known event. Patient denies N/A. Therapy to date includes OTC analgesics: effective. Hypertension Review:  The patient has essential hypertension  Diet and Lifestyle: generally follows a  low sodium diet, exercises sporadically  Home BP Monitoring: is not measured at home. Pertinent ROS: taking medications as instructed, no medication side effects noted, no TIA's, no chest pain on exertion, no dyspnea on exertion, no swelling of ankles. Dyslipidemia Review:  Patient presents for evaluation of lipids. Compliance with treatment thus far has been excellent. A repeat fasting lipid profile was done. The patient does not use medications that may worsen dyslipidemias (corticosteroids, progestins, anabolic steroids, diuretics, beta-blockers, amiodarone, cyclosporine, olanzapine). The patient exercises some    Diabetes Mellitus Review:  She has diabetes mellitus. Diabetic ROS - medication compliance: compliant all of the time, diabetic diet compliance: compliant all of the time, home glucose monitoring: is performed.    Known diabetic complications: none  Cardiovascular risk factors: family history, dyslipidemia, diabetes mellitus, obesity, hypertension  Current diabetic medications include oral agents/insulin   Eye exam current (within one year): no  Weight trend: stable  Prior visit with dietician: no  Current diet: \"healthy\" diet  in general  Current exercise: walking  Current monitoring regimen: home blood tests - daily  Home blood sugar records: trend: stable  Any episodes of hypoglycemia? no  Is She on ACE inhibitor or angiotensin II receptor blocker? Yes           Review of Systems  Constitutional: negative for fevers, chills, anorexia and weight loss  Eyes:   negative for visual disturbance and irritation  ENT:   negative for tinnitus,sore throat,nasal congestion,ear pains. hoarseness  Respiratory:  cough,   CV:   negative for chest pain, palpitations, lower extremity edema  GI:   negative for nausea, vomiting, diarrhea, abdominal pain,melena  Endo:               negative for polyuria,polydipsia,polyphagia,heat intolerance  Genitourinary: negative for frequency, dysuria and hematuria  Integument:  negative for rash and pruritus  Hematologic:  negative for easy bruising and gum/nose bleeding  Musculoskel: myalgias, arthralgias, back pain, muscle weakness, joint pain  Neurological:  negative for headaches, dizziness, vertigo, memory problems and gait   Behavl/Psych: negative for feelings of anxiety, depression, mood changes    Past Medical History:   Diagnosis Date    Acute cholecystitis 2/9/2011    Acute cystitis 2/9/2011    Cholecystitis 2/9/2011    Cholelithiasis 2/9/2011    Chronic Pain 2/9/2011    Essential hypertension, benign 2/9/2011    GERD (gastroesophageal reflux disease)     GERD, Gastro- Esophageal Reflux Diease (acid reflux) 2/9/2011    Herniated nucleus pulposus 2/9/2011    Hypercholesteremia 2/9/2011    Hypercholesteremia 2/9/2011    Hypertension     Neurogenic bladder, NOS 2/9/2011     Past Surgical History:   Procedure Laterality Date    HX GYN      HX HAMMER TOE REPAIR  6/21/2016    left foot      Social History     Socioeconomic History    Marital status:      Spouse name: Not on file    Number of children: Not on file    Years of education: Not on file    Highest education level: Not on file   Tobacco Use    Smoking status: Never Smoker    Smokeless tobacco: Never Used   Substance and Sexual Activity    Alcohol use: No    Drug use: No    Sexual activity: Yes     Partners: Male     Family History Problem Relation Age of Onset    Heart Disease Mother     Cancer Father      Current Outpatient Medications   Medication Sig Dispense Refill    pregabalin (LYRICA) 150 mg capsule TAKE 1 CAPSULE BY MOUTH TWICE A DAY 60 Cap 3    losartan (COZAAR) 25 mg tablet TAKE 1 TABLET BY MOUTH EVERY DAY 30 Tab 2    celecoxib (CELEBREX) 200 mg capsule TAKE 1 CAPSULE BY MOUTH EVERY DAY 90 Cap 3    naloxone 2 mg/actuation spry Use 1 spray intranasally, then discard. Repeat with new spray every 2 min as needed for opioid overdose symptoms, alternating nostrils. 2 Syringe 0    fenofibrate micronized (LOFIBRA) 134 mg capsule TAKE 1 CAPSULE BY MOUTH EVERY DAY 30 Cap 6    SITagliptin (JANUVIA) 100 mg tablet TAKE 1 TABLET BY MOUTH EVERY DAY 30 Tab 11    raNITIdine (ZANTAC) 300 mg tab Take 1 Tab by mouth nightly. 30 Tab 12    metFORMIN (GLUCOPHAGE) 1,000 mg tablet Take 1 Tab by mouth two (2) times a day. 60 Tab 12    meloxicam (MOBIC) 15 mg tablet Take 1 Tab by mouth daily. 30 Tab 12    cetirizine (ZYRTEC) 10 mg tablet Take 1 Tab by mouth daily. 30 Tab 3    irbesartan (AVAPRO) 150 mg tablet TAKE ONE TABLET EVERY NIGHT AT BEDTIME. 30 Tab 12    aspirin 81 mg tablet Take  by mouth.  pantoprazole (PROTONIX) 40 mg tablet TAKE ONE TABLET BY MOUTH TWICE A DAY (Patient not taking: Reported on 2/3/2020) 60 Tab 12    fluticasone (FLONASE) 50 mcg/actuation nasal spray 2 Sprays by Both Nostrils route daily. 1 Bottle 12    ipratropium (ATROVENT) 0.06 % nasal spray 2 Sprays by Both Nostrils route four (4) times daily. (Patient not taking: Reported on 2/3/2020) 15 mL 0    diphenoxylate-atropine (LOMOTIL) 2.5-0.025 mg per tablet Take 1 tablet by mouth four (4) times daily as needed for Diarrhea.  60 tablet 0     Allergies   Allergen Reactions    Diclofenac Itching       Objective:  Visit Vitals  BP 96/66   Pulse 94   Temp 98 °F (36.7 °C) (Oral)   Resp 16   Ht 5' 1\" (1.549 m)   Wt 171 lb (77.6 kg)   SpO2 93%   BMI 32.31 kg/m² Physical Exam:   General appearance - alert, well appearing, and in no distress  Mental status - alert, oriented to person, place, and time  EYE-LAURI, EOMI, corneas normal, no foreign bodies  ENT-ENT exam normal, no neck nodes or sinus tenderness  Nose - normal and patent, no erythema, discharge or polyps  Mouth - mucous membranes moist, pharynx normal without lesions  Neck - supple, no significant adenopathy   Chest - clear to auscultation, no wheezes, rales or rhonchi, symmetric air entry   Heart - normal rate, regular rhythm, normal S1, S2, no murmurs, rubs, clicks or gallops   Abdomen - soft, nontender, nondistended, no masses or organomegaly  Lymph- no adenopathy palpable  Ext-peripheral pulses normal, no pedal edema, no clubbing or cyanosis  Skin-Warm and dry. no hyperpigmentation, vitiligo, or suspicious lesions  Neuro -alert, oriented, normal speech, no focal findings or movement disorder noted  Neck-normal C-spine, no tenderness, full ROM without pain  Feet-no nail deformities or callus formation with good pulses noted      Results for orders placed or performed in visit on 02/03/20   AMB POC GLUCOSE BLOOD, BY GLUCOSE MONITORING DEVICE   Result Value Ref Range    Glucose  mg/dL   AMB POC HEMOGLOBIN A1C   Result Value Ref Range    Hemoglobin A1c (POC) 6.9 %   AMB POC LIPID PROFILE   Result Value Ref Range    Cholesterol (POC) 187     Triglycerides (POC) 89     HDL Cholesterol (POC) 56     LDL Cholesterol (POC) 113 MG/DL    Non-HDL Goal (POC) 131     TChol/HDL Ratio (POC) 3.3        Assessment/Plan:    ICD-10-CM ICD-9-CM    1. Controlled type 2 diabetes mellitus without complication, without long-term current use of insulin (HCC) E11.9 250.00 AMB POC GLUCOSE BLOOD, BY GLUCOSE MONITORING DEVICE      AMB POC HEMOGLOBIN A1C   2. Hypercholesteremia E78.00 272.0 AMB POC LIPID PROFILE   3.  Essential hypertension, benign I10 401.1    4. Spinal stenosis, lumbar region with neurogenic claudication M48.062 724.03    5. Rotator cuff arthropathy of right shoulder M12.811 716.81      Orders Placed This Encounter    AMB POC GLUCOSE BLOOD, BY GLUCOSE MONITORING DEVICE    AMB POC HEMOGLOBIN A1C    AMB POC LIPID PROFILE     lose weight, follow low fat diet, follow low salt diet, continue present plan,Take 81mg aspirin daily                Patient Instructions   MyChart Activation    Thank you for requesting access to [employer]. Please follow the instructions below to securely access and download your online medical record. [employer] allows you to send messages to your doctor, view your test results, renew your prescriptions, schedule appointments, and more. How Do I Sign Up? 1. In your internet browser, go to [email or web address]  2. Click on the First Time User? Click Here link in the Sign In box. You will be redirect to the New Member Sign Up page. 3. Enter your [employer] Access Code exactly as it appears below. You will not need to use this code after youve completed the sign-up process. If you do not sign up before the expiration date, you must request a new code. [employer] Access Code: Activation code not generated  Current [facility] Status: Active (This is the date your [employer] access code will )    4. Enter the last four digits of your Social Security Number (xxxx) and Date of Birth (mm/dd/yyyy) as indicated and click Submit. You will be taken to the next sign-up page. 5. Create a [facility] ID. This will be your [employer] login ID and cannot be changed, so think of one that is secure and easy to remember. 6. Create a [employer] password. You can change your password at any time. 7. Enter your Password Reset Question and Answer. This can be used at a later time if you forget your password. 8. Enter your e-mail address. You will receive e-mail notification when new information is available in [address] 9. Click Sign Up. You can now view and download portions of your medical record.   10. Click the Download Summary menu link to download a portable copy of your medical information. Additional Information    If you have questions, please visit the Frequently Asked Questions section of the [employer] website at [email or web address] Remember, [employer] is NOT to be used for urgent needs. For medical emergencies, dial 911. Follow-up and Dispositions    · Return in about 3 months (around 5/3/2020), or if symptoms worsen or fail to improve. I have reviewed with the patient details of the assessment and plan and all questions were answered. Relevent patient education was performed. The most recent lab findings were reviewed with the patient. An After Visit Summary was printed and given to the patient.

## 2020-02-03 NOTE — PROGRESS NOTES
1. Have you been to the ER, urgent care clinic since your last visit? Hospitalized since your last visit?no    2. Have you seen or consulted any other health care providers outside of the [address] [place] [address] since your last visit? Include any pap smears or colon screening. No    3 most recent PHQ Screens 11/6/2019   PHQ Not Done Patient Decline   Little interest or pleasure in doing things -   Feeling down, depressed, irritable, or hopeless -   Total Score PHQ 2 -     Chief Complaint   Patient presents with    Diabetes    Cholesterol Problem    Hypertension     Per Dr. Mishel Watson.,  verbal order given for needed amb poc labs.

## 2020-02-03 NOTE — PATIENT INSTRUCTIONS
[email or web address] Activation Thank you for requesting access to [employer]. Please follow the instructions below to securely access and download your online medical record. [employer] allows you to send messages to your doctor, view your test results, renew your prescriptions, schedule appointments, and more. How Do I Sign Up? 1. In your internet browser, go to [email or web address] 
2. Click on the First Time User? Click Here link in the Sign In box. You will be redirect to the New Member Sign Up page. 3. Enter your [employer] Access Code exactly as it appears below. You will not need to use this code after youve completed the sign-up process. If you do not sign up before the expiration date, you must request a new code. [employer] Access Code: Activation code not generated Current [employer] Status: Active (This is the date your [employer] access code will ) 4. Enter the last four digits of your Social Security Number (xxxx) and Date of Birth (mm/dd/yyyy) as indicated and click Submit. You will be taken to the next sign-up page. 5. Create a [employer] ID. This will be your [employer] login ID and cannot be changed, so think of one that is secure and easy to remember. 6. Create a [employer] password. You can change your password at any time. 7. Enter your Password Reset Question and Answer. This can be used at a later time if you forget your password. 8. Enter your e-mail address. You will receive e-mail notification when new information is available in [address] 9. Click Sign Up. You can now view and download portions of your medical record. 10. Click the Download Summary menu link to download a portable copy of your medical information. Additional Information If you have questions, please visit the Frequently Asked Questions section of the [employer] website at [email or web address] Remember, [employer] is NOT to be used for urgent needs. For medical emergencies, dial 911.

## 2020-02-18 ENCOUNTER — OFFICE VISIT (OUTPATIENT)
Dept: INTERNAL MEDICINE CLINIC | Age: 65
End: 2020-02-18

## 2020-02-18 VITALS
HEART RATE: 68 BPM | WEIGHT: 171 LBS | OXYGEN SATURATION: 98 % | DIASTOLIC BLOOD PRESSURE: 70 MMHG | BODY MASS INDEX: 32.28 KG/M2 | HEIGHT: 61 IN | SYSTOLIC BLOOD PRESSURE: 120 MMHG | TEMPERATURE: 97.9 F | RESPIRATION RATE: 18 BRPM

## 2020-02-18 DIAGNOSIS — I10 ESSENTIAL HYPERTENSION, BENIGN: ICD-10-CM

## 2020-02-18 DIAGNOSIS — E11.9 CONTROLLED TYPE 2 DIABETES MELLITUS WITHOUT COMPLICATION, WITHOUT LONG-TERM CURRENT USE OF INSULIN (HCC): Primary | ICD-10-CM

## 2020-02-18 DIAGNOSIS — M12.811 ROTATOR CUFF ARTHROPATHY OF RIGHT SHOULDER: ICD-10-CM

## 2020-02-18 RX ORDER — LIDOCAINE HYDROCHLORIDE 20 MG/ML
2 INJECTION, SOLUTION EPIDURAL; INFILTRATION; INTRACAUDAL; PERINEURAL ONCE
Qty: 2 ML | Refills: 0
Start: 2020-02-18 | End: 2020-02-18

## 2020-02-18 RX ORDER — TRIAMCINOLONE ACETONIDE 40 MG/ML
40 INJECTION, SUSPENSION INTRA-ARTICULAR; INTRAMUSCULAR ONCE
Qty: 1 ML | Refills: 0
Start: 2020-02-18 | End: 2020-02-18

## 2020-02-18 NOTE — PATIENT INSTRUCTIONS
[employer] Activation Thank you for requesting access to [employer] Please follow the instructions below to securely access and download your online medical record. [employer] allows you to send messages to your doctor, view your test results, renew your prescriptions, schedule appointments, and more. How Do I Sign Up? 1. In your internet browser, go to [email or web address] 
2. Click on the First Time User? Click Here link in the Sign In box. You will be redirect to the New Member Sign Up page. 3. Enter your [employer] Access Code exactly as it appears below. You will not need to use this code after youve completed the sign-up process. If you do not sign up before the expiration date, you must request a new code. [employer] Access Code: Activation code not generated Current [employer] Status: Active (This is the date your [employer] access code will ) 4. Enter the last four digits of your Social Security Number (xxxx) and Date of Birth (mm/dd/yyyy) as indicated and click Submit. You will be taken to the next sign-up page. 5. Create a [employer] ID. This will be your [employer] login ID and cannot be changed, so think of one that is secure and easy to remember. 6. Create a [employer] password. You can change your password at any time. 7. Enter your Password Reset Question and Answer. This can be used at a later time if you forget your password. 8. Enter your e-mail address. You will receive e-mail notification when new information is available in [address] 9. Click Sign Up. You can now view and download portions of your medical record. 10. Click the Download Summary menu link to download a portable copy of your medical information. Additional Information If you have questions, please visit the Frequently Asked Questions section of the [employer] website at [email or web address] Remember, [employer] is NOT to be used for urgent needs. For medical emergencies, dial 911.

## 2020-02-18 NOTE — PROGRESS NOTES
Maegan Shane is a 59 y.o. female and presents with Shoulder Pain  . Subjective:    Shoulder Pain Review:  Patient complains of right side shoulder pain. The symptoms began several weeks ago Course of symptoms since onset has been gradually worsening. Pain is described as overall severity = moderate. Symptoms were incited by no known event. Patient denies N/A. Therapy to date includes OTC analgesics: effective. Review of Systems  Constitutional: negative for fevers, chills, anorexia and weight loss  Eyes:   negative for visual disturbance and irritation  ENT:   negative for tinnitus,sore throat,nasal congestion,ear pains. hoarseness  Respiratory:  negative for cough, hemoptysis, dyspnea,wheezing  CV:   negative for chest pain, palpitations, lower extremity edema  GI:   negative for nausea, vomiting, diarrhea, abdominal pain,melena  Endo:               negative for polyuria,polydipsia,polyphagia,heat intolerance  Genitourinary: negative for frequency, dysuria and hematuria  Integument:  negative for rash and pruritus  Hematologic:  negative for easy bruising and gum/nose bleeding  Musculoskel: negative for myalgias, arthralgias, back pain, muscle weakness, joint pain  Neurological:  negative for headaches, dizziness, vertigo, memory problems and gait   Behavl/Psych: negative for feelings of anxiety, depression, mood changes    Past Medical History:   Diagnosis Date    Acute cholecystitis 2/9/2011    Acute cystitis 2/9/2011    Cholecystitis 2/9/2011    Cholelithiasis 2/9/2011    Chronic Pain 2/9/2011    Essential hypertension, benign 2/9/2011    GERD (gastroesophageal reflux disease)     GERD, Gastro- Esophageal Reflux Diease (acid reflux) 2/9/2011    Herniated nucleus pulposus 2/9/2011    Hypercholesteremia 2/9/2011    Hypercholesteremia 2/9/2011    Hypertension     Neurogenic bladder, NOS 2/9/2011     Past Surgical History:   Procedure Laterality Date    HX GYN      HX HAMMER TOE REPAIR 6/21/2016    left foot      Social History     Socioeconomic History    Marital status:      Spouse name: Not on file    Number of children: Not on file    Years of education: Not on file    Highest education level: Not on file   Tobacco Use    Smoking status: Never Smoker    Smokeless tobacco: Never Used   Substance and Sexual Activity    Alcohol use: No    Drug use: No    Sexual activity: Yes     Partners: Male     Family History   Problem Relation Age of Onset    Heart Disease Mother     Cancer Father      Current Outpatient Medications   Medication Sig Dispense Refill    pregabalin (LYRICA) 150 mg capsule TAKE 1 CAPSULE BY MOUTH TWICE A DAY 60 Cap 3    losartan (COZAAR) 25 mg tablet TAKE 1 TABLET BY MOUTH EVERY DAY 30 Tab 2    celecoxib (CELEBREX) 200 mg capsule TAKE 1 CAPSULE BY MOUTH EVERY DAY 90 Cap 3    naloxone 2 mg/actuation spry Use 1 spray intranasally, then discard. Repeat with new spray every 2 min as needed for opioid overdose symptoms, alternating nostrils. 2 Syringe 0    fenofibrate micronized (LOFIBRA) 134 mg capsule TAKE 1 CAPSULE BY MOUTH EVERY DAY 30 Cap 6    SITagliptin (JANUVIA) 100 mg tablet TAKE 1 TABLET BY MOUTH EVERY DAY 30 Tab 11    raNITIdine (ZANTAC) 300 mg tab Take 1 Tab by mouth nightly. 30 Tab 12    metFORMIN (GLUCOPHAGE) 1,000 mg tablet Take 1 Tab by mouth two (2) times a day. 60 Tab 12    meloxicam (MOBIC) 15 mg tablet Take 1 Tab by mouth daily. 30 Tab 12    fluticasone (FLONASE) 50 mcg/actuation nasal spray 2 Sprays by Both Nostrils route daily. 1 Bottle 12    cetirizine (ZYRTEC) 10 mg tablet Take 1 Tab by mouth daily. 30 Tab 3    irbesartan (AVAPRO) 150 mg tablet TAKE ONE TABLET EVERY NIGHT AT BEDTIME. 30 Tab 12    aspirin 81 mg tablet Take  by mouth.       pantoprazole (PROTONIX) 40 mg tablet TAKE ONE TABLET BY MOUTH TWICE A DAY (Patient not taking: Reported on 2/3/2020) 60 Tab 12    ipratropium (ATROVENT) 0.06 % nasal spray 2 Sprays by Both Nostrils route four (4) times daily. (Patient not taking: Reported on 2/3/2020) 15 mL 0    diphenoxylate-atropine (LOMOTIL) 2.5-0.025 mg per tablet Take 1 tablet by mouth four (4) times daily as needed for Diarrhea. 60 tablet 0     Allergies   Allergen Reactions    Diclofenac Itching       Objective: There were no vitals taken for this visit. Physical Exam:   General appearance - alert, well appearing, and in no distress  Mental status - alert, oriented to person, place, and time  EYE-LAURI, EOMI, corneas normal, no foreign bodies  ENT-ENT exam normal, no neck nodes or sinus tenderness  Nose - normal and patent, no erythema, discharge or polyps  Mouth - mucous membranes moist, pharynx normal without lesions  Neck - supple, no significant adenopathy   Chest - clear to auscultation, no wheezes, rales or rhonchi, symmetric air entry   Heart - normal rate, regular rhythm, normal S1, S2, no murmurs, rubs, clicks or gallops   Abdomen - soft, nontender, nondistended, no masses or organomegaly  Lymph- no adenopathy palpable  Ext-peripheral pulses normal, no pedal edema, no clubbing or cyanosis  Skin-Warm and dry.  no hyperpigmentation, vitiligo, or suspicious lesions  Neuro -alert, oriented, normal speech, no focal findings or movement disorder noted  Neck-normal C-spine, no tenderness, full ROM without pain  Feet-no nail deformities or callus formation with good pulses noted  RT.SHOULDER-positive impingement signs    Results for orders placed or performed in visit on 02/03/20   AMB POC GLUCOSE BLOOD, BY GLUCOSE MONITORING DEVICE   Result Value Ref Range    Glucose  mg/dL   AMB POC HEMOGLOBIN A1C   Result Value Ref Range    Hemoglobin A1c (POC) 6.9 %   AMB POC LIPID PROFILE   Result Value Ref Range    Cholesterol (POC) 187     Triglycerides (POC) 89     HDL Cholesterol (POC) 56     LDL Cholesterol (POC) 113 MG/DL    Non-HDL Goal (POC) 131     TChol/HDL Ratio (POC) 3.3        Assessment/Plan:  No diagnosis found. No orders of the defined types were placed in this encounter. lose weight, increase physical activity, follow low fat diet, follow low salt diet,Take 81mg aspirin daily  There are no Patient Instructions on file for this visit. Indications:   Symptomatic relief of pain    Procedure:  After consent was obtained, using sterile technique the right shoulder joint was prepped using alcohol. Local anesthetic used: 1% lidocaine. . The joint was entered and Kenalog 40 mg was mixed with 1% lidocaine 3 ml  and injected into the joint and the needle withdrawn. The procedure was well tolerated. The patient is asked to continue to rest the joint for a few more days before resuming regular activities. It may be more painful for the first 1-2 days. Watch for fever, or increased swelling or persistent pain in the joint. Call or return to clinic prn if such symptoms occur or there is failure to improve as anticipated.       [facility]  OFFICE PROCEDURE PROGRESS NOTE        Chart reviewed for the following:   Marifer Lizarraga MD, have reviewed the History, Physical and updated the Allergic reactions for [address] performed immediately prior to start of procedure:   I, Kyree eFnton MD, have performed the following reviews on [place] prior to the start of the procedure:            * Patient was identified by name and date of birth   * Agreement on procedure being performed was verified  * Risks and Benefits explained to the patient  * Procedure site verified and marked as necessary  * Patient was positioned for comfort       Time: 12;40PM      Date of procedure: 2/18/2020    Procedure performed by:  Kyree Fenton MD    Patient assisted by: nursing attendant    How tolerated by patient: tolerated the procedure well with no complications    Comments: none                      I have reviewed with the patient details of the assessment and plan and all questions were answered. Relevent patient education was performed. The most recent lab findings were reviewed with the patient. An After Visit Summary was printed and given to the patient.

## 2020-02-18 NOTE — PROGRESS NOTES
Chief Complaint   Patient presents with    Shoulder Pain     3 most recent PHQ Screens 2/18/2020   PHQ Not Done -   Little interest or pleasure in doing things Not at all   Feeling down, depressed, irritable, or hopeless Not at all   Total Score PHQ 2 0     1. Have you been to the ER, urgent care clinic since your last visit? Hospitalized since your last visit?no    2. Have you seen or consulted any other health care providers outside of the [address] [place] [address] since your last visit? Include any pap smears or colon screening.  no

## 2020-03-24 DIAGNOSIS — E11.9 CONTROLLED TYPE 2 DIABETES MELLITUS WITHOUT COMPLICATION, WITHOUT LONG-TERM CURRENT USE OF INSULIN (HCC): ICD-10-CM

## 2020-03-24 DIAGNOSIS — I10 ESSENTIAL HYPERTENSION, BENIGN: ICD-10-CM

## 2020-03-24 DIAGNOSIS — E78.00 HYPERCHOLESTEREMIA: ICD-10-CM

## 2020-03-24 RX ORDER — LOSARTAN POTASSIUM 25 MG/1
TABLET ORAL
Qty: 30 TAB | Refills: 2 | Status: SHIPPED | OUTPATIENT
Start: 2020-03-24 | End: 2020-07-02

## 2020-04-15 DIAGNOSIS — E11.42 TYPE 2 DIABETES MELLITUS WITH DIABETIC POLYNEUROPATHY, WITHOUT LONG-TERM CURRENT USE OF INSULIN (HCC): ICD-10-CM

## 2020-04-16 RX ORDER — METFORMIN HYDROCHLORIDE 1000 MG/1
TABLET ORAL
Qty: 60 TAB | Refills: 12 | Status: SHIPPED | OUTPATIENT
Start: 2020-04-16 | End: 2022-03-01 | Stop reason: SDUPTHER

## 2020-05-02 DIAGNOSIS — E11.9 CONTROLLED TYPE 2 DIABETES MELLITUS WITHOUT COMPLICATION, WITHOUT LONG-TERM CURRENT USE OF INSULIN (HCC): ICD-10-CM

## 2020-05-03 RX ORDER — PREGABALIN 150 MG/1
CAPSULE ORAL
Qty: 60 CAP | Refills: 3 | Status: SHIPPED | OUTPATIENT
Start: 2020-05-03 | End: 2020-11-30

## 2020-05-04 ENCOUNTER — VIRTUAL VISIT (OUTPATIENT)
Dept: INTERNAL MEDICINE CLINIC | Age: 65
End: 2020-05-04

## 2020-05-04 DIAGNOSIS — M48.062 SPINAL STENOSIS, LUMBAR REGION WITH NEUROGENIC CLAUDICATION: Primary | ICD-10-CM

## 2020-05-04 DIAGNOSIS — E11.9 CONTROLLED TYPE 2 DIABETES MELLITUS WITHOUT COMPLICATION, WITHOUT LONG-TERM CURRENT USE OF INSULIN (HCC): ICD-10-CM

## 2020-05-04 DIAGNOSIS — I10 ESSENTIAL HYPERTENSION, BENIGN: ICD-10-CM

## 2020-05-04 DIAGNOSIS — M53.9 MULTILEVEL DEGENERATIVE DISC DISEASE: ICD-10-CM

## 2020-05-04 RX ORDER — DULOXETIN HYDROCHLORIDE 60 MG/1
60 CAPSULE, DELAYED RELEASE ORAL DAILY
Qty: 30 CAP | Refills: 3 | Status: SHIPPED | OUTPATIENT
Start: 2020-05-04 | End: 2020-08-31

## 2020-05-04 RX ORDER — PANTOPRAZOLE SODIUM 40 MG/1
40 TABLET, DELAYED RELEASE ORAL DAILY
Qty: 30 TAB | Refills: 3 | Status: SHIPPED | OUTPATIENT
Start: 2020-05-04 | End: 2020-08-04

## 2020-05-04 NOTE — PATIENT INSTRUCTIONS
[employer] Activation Thank you for requesting access to [facility]. Please follow the instructions below to securely access and download your online medical record. [facility] allows you to send messages to your doctor, view your test results, renew your prescriptions, schedule appointments, and more. How Do I Sign Up? 1. In your internet browser, go to [email or web address] 
2. Click on the First Time User? Click Here link in the Sign In box. You will be redirect to the New Member Sign Up page. 3. Enter your [facility]-RID Access Code exactly as it appears below. You will not need to use this code after youve completed the sign-up process. If you do not sign up before the expiration date, you must request a new code. [facility]-RID Access Code: Activation code not generated Current O-RID Status: Active (This is the date your [facility]-RID access code will ) 4. Enter the last four digits of your Social Security Number (xxxx) and Date of Birth (mm/dd/yyyy) as indicated and click Submit. You will be taken to the next sign-up page. 5. Create a O-RID ID. This will be your [facility]-RID login ID and cannot be changed, so think of one that is secure and easy to remember. 6. Create a [facility]-RID password. You can change your password at any time. 7. Enter your Password Reset Question and Answer. This can be used at a later time if you forget your password. 8. Enter your e-mail address. You will receive e-mail notification when new information is available in [address] 9. Click Sign Up. You can now view and download portions of your medical record. 10. Click the Download Summary menu link to download a portable copy of your medical information. Additional Information If you have questions, please visit the Frequently Asked Questions section of the O-R[facility] website at [email or web address] Remember, [facility]-R[facility] is NOT to be used for urgent needs. For medical emergencies, dial 911.

## 2020-05-04 NOTE — PROGRESS NOTES
Chief Complaint   Patient presents with    Leg Pain     3 most recent PHQ Screens 5/4/2020   PHQ Not Done -   Little interest or pleasure in doing things Not at all   Feeling down, depressed, irritable, or hopeless Not at all   Total Score PHQ 2 0     1. Have you been to the ER, urgent care clinic since your last visit? Hospitalized since your last visit?no    2. Have you seen or consulted any other health care providers outside of the [address] [place] [address] since your last visit? Include any pap smears or colon screening.  no

## 2020-05-04 NOTE — PROGRESS NOTES
Natasha Lomeli is a 59 y.o. female being evaluated by a Virtual Visit (video visit) encounter to address concerns as mentioned above. A caregiver was present when appropriate. Due to this being a TeleHealth encounter (During DNLYW-01 public health emergency), evaluation of the following organ systems was limited: Vitals/Constitutional/EENT/Resp/CV/GI//MS/Neuro/Skin/Heme-Lymph-Imm. Pursuant to the emergency declaration under the [address] [place] [address] and the [employer] Act, this Virtual Visit was conducted with patient's (and/or legal guardian's) consent, to reduce the risk of exposure to COVID-19 and provide necessary medical care. Services were provided through a video synchronous discussion virtually to substitute for in-person encounter. --Deidre Smith MD on 5/4/2020 at 2:31 PM    An electronic signature was used to authenticate this note. Natasha Lomeli is a 59 y.o. female and presents with Leg Pain  . Subjective:    Back Pain Review:  Patient presents for evaluation of low back problems. Symptoms have been present for  weeks and include pain in lower back (dull, mild in character; 9/10 in severity). Initial inciting event: unknown. Symptoms are worst: at times. Alleviating factors identifiable by patient are lying flat, medication . Exacerbating factors identifiable by patient are bending forwards, bending backwards. Treatments so far initiated by patient: medication Previous lower back problems: reported. Previous workup:mri. She has pains shooting up and down her legs  She also reports numbness    Shoulder Pain Review:  Patient complaints of right side shoulder pains improved. The symptoms began years ago Course of symptoms since onset has been gradually better Pain is described as overall severity = moderate. Symptoms were incited by no known event. Patient denies N/A.  Therapy to date includes OTC analgesics: effective.     Hypertension Review:  The patient has essential hypertension  Diet and Lifestyle: generally follows a  low sodium diet, exercises sporadically  Home BP Monitoring: is not measured at home. Pertinent ROS: taking medications as instructed, no medication side effects noted, no TIA's, no chest pain on exertion, no dyspnea on exertion, no swelling of ankles.      Dyslipidemia Review:  Patient presents for evaluation of lipids. Compliance with treatment thus far has been excellent. A repeat fasting lipid profile was done. The patient does not use medications that may worsen dyslipidemias (corticosteroids, progestins, anabolic steroids, diuretics, beta-blockers, amiodarone, cyclosporine, olanzapine). The patient exercises some     Diabetes Mellitus Review:  She has diabetes mellitus. Diabetic ROS - medication compliance: compliant all of the time, diabetic diet compliance: compliant all of the time, home glucose monitoring: is performed. Known diabetic complications: none  Cardiovascular risk factors: family history, dyslipidemia, diabetes mellitus, obesity, hypertension  Current diabetic medications include oral agents/insulin   Eye exam current (within one year): no  Weight trend: stable  Prior visit with dietician: no  Current diet: \"healthy\" diet  in general  Current exercise: walking  Current monitoring regimen: home blood tests - daily  Home blood sugar records: trend: stable  Any episodes of hypoglycemia? no  Is She on ACE inhibitor or angiotensin II receptor blocker? Yes        Review of Systems  Constitutional: negative for fevers, chills, anorexia and weight loss  Eyes:   negative for visual disturbance and irritation  ENT:   negative for tinnitus,sore throat,nasal congestion,ear pains. hoarseness  Respiratory:  negative for cough, hemoptysis, dyspnea,wheezing  CV:   negative for chest pain, palpitations, lower extremity edema  GI:   negative for nausea, vomiting, diarrhea, abdominal pain,melena  Endo:               negative for polyuria,polydipsia,polyphagia,heat intolerance  Genitourinary: negative for frequency, dysuria and hematuria  Integument:  negative for rash and pruritus  Hematologic:  negative for easy bruising and gum/nose bleeding  Musculoskel: negative for myalgias, arthralgias, back pain, muscle weakness, joint pain  Neurological:  negative for headaches, dizziness, vertigo, memory problems and gait   Behavl/Psych: negative for feelings of anxiety, depression, mood changes    Past Medical History:   Diagnosis Date    Acute cholecystitis 2/9/2011    Acute cystitis 2/9/2011    Cholecystitis 2/9/2011    Cholelithiasis 2/9/2011    Chronic Pain 2/9/2011    Essential hypertension, benign 2/9/2011    GERD (gastroesophageal reflux disease)     GERD, Gastro- Esophageal Reflux Diease (acid reflux) 2/9/2011    Herniated nucleus pulposus 2/9/2011    Hypercholesteremia 2/9/2011    Hypercholesteremia 2/9/2011    Hypertension     Neurogenic bladder, NOS 2/9/2011     Past Surgical History:   Procedure Laterality Date    HX GYN      HX HAMMER TOE REPAIR  6/21/2016    left foot      Social History     Socioeconomic History    Marital status:      Spouse name: Not on file    Number of children: Not on file    Years of education: Not on file    Highest education level: Not on file   Tobacco Use    Smoking status: Never Smoker    Smokeless tobacco: Never Used   Substance and Sexual Activity    Alcohol use: No    Drug use: No    Sexual activity: Yes     Partners: Male     Family History   Problem Relation Age of Onset    Heart Disease Mother     Cancer Father      Current Outpatient Medications   Medication Sig Dispense Refill    pregabalin (LYRICA) 150 mg capsule TAKE 1 CAPSULE BY MOUTH TWICE A DAY 60 Cap 3    metFORMIN (GLUCOPHAGE) 1,000 mg tablet TAKE 1 TABLET BY MOUTH TWICE A DAY 60 Tab 12    losartan (COZAAR) 25 mg tablet TAKE 1 TABLET BY MOUTH EVERY DAY 30 Tab 2    celecoxib (CELEBREX) 200 mg capsule TAKE 1 CAPSULE BY MOUTH EVERY DAY 90 Cap 3    fenofibrate micronized (LOFIBRA) 134 mg capsule TAKE 1 CAPSULE BY MOUTH EVERY DAY 30 Cap 6    SITagliptin (JANUVIA) 100 mg tablet TAKE 1 TABLET BY MOUTH EVERY DAY 30 Tab 11    raNITIdine (ZANTAC) 300 mg tab Take 1 Tab by mouth nightly. 30 Tab 12    meloxicam (MOBIC) 15 mg tablet Take 1 Tab by mouth daily. 30 Tab 12    fluticasone (FLONASE) 50 mcg/actuation nasal spray 2 Sprays by Both Nostrils route daily. 1 Bottle 12    cetirizine (ZYRTEC) 10 mg tablet Take 1 Tab by mouth daily. 30 Tab 3    irbesartan (AVAPRO) 150 mg tablet TAKE ONE TABLET EVERY NIGHT AT BEDTIME. 30 Tab 12    aspirin 81 mg tablet Take  by mouth.  naloxone 2 mg/actuation spry Use 1 spray intranasally, then discard. Repeat with new spray every 2 min as needed for opioid overdose symptoms, alternating nostrils. 2 Syringe 0    pantoprazole (PROTONIX) 40 mg tablet TAKE ONE TABLET BY MOUTH TWICE A DAY (Patient not taking: Reported on 2/3/2020) 60 Tab 12    ipratropium (ATROVENT) 0.06 % nasal spray 2 Sprays by Both Nostrils route four (4) times daily. (Patient not taking: Reported on 2/3/2020) 15 mL 0    diphenoxylate-atropine (LOMOTIL) 2.5-0.025 mg per tablet Take 1 tablet by mouth four (4) times daily as needed for Diarrhea. 60 tablet 0     Allergies   Allergen Reactions    Diclofenac Itching       Objective: There were no vitals taken for this visit. Physical Exam:   General appearance - alert, well appearing, and in no distress  Mental status - alert, oriented to person, place, and time  EYE-LAURI, EOMI, corneas normal, no foreign bodies  ENT-ENT exam normal, no neck nodes or sinus tenderness  Nose - normal and patent, no erythema, discharge or polyps  Mouth - mucous membranes moist, pharynx normal without lesions  Skin-Warm and dry.  no hyperpigmentation, vitiligo, or suspicious lesions  Neuro -alert, oriented, normal speech, no focal findings or movement disorder noted  Neck-normal C-spine, no tenderness, full ROM without pain        Results for orders placed or performed in visit on 02/03/20   AMB POC GLUCOSE BLOOD, BY GLUCOSE MONITORING DEVICE   Result Value Ref Range    Glucose  mg/dL   AMB POC HEMOGLOBIN A1C   Result Value Ref Range    Hemoglobin A1c (POC) 6.9 %   AMB POC LIPID PROFILE   Result Value Ref Range    Cholesterol (POC) 187     Triglycerides (POC) 89     HDL Cholesterol (POC) 56     LDL Cholesterol (POC) 113 MG/DL    Non-HDL Goal (POC) 131     TChol/HDL Ratio (POC) 3.3        Assessment/Plan:    ICD-10-CM ICD-9-CM    1. Spinal stenosis, lumbar region with neurogenic claudication M48.062 724.03    2. Essential hypertension, benign I10 401.1    3. Controlled type 2 diabetes mellitus without complication, without long-term current use of insulin (HCC) E11.9 250.00    4. Multilevel degenerative disc disease M53.9 722.6      No orders of the defined types were placed in this encounter. call if any problems,Take 81mg aspirin daily  Patient Instructions   [employer] Activation    Thank you for requesting access to [facility]. Please follow the instructions below to securely access and download your online medical record. [facility] allows you to send messages to your doctor, view your test results, renew your prescriptions, schedule appointments, and more. How Do I Sign Up? 1. In your internet browser, go to [email or web address]  2. Click on the First Time User? Click Here link in the Sign In box. You will be redirect to the New Member Sign Up page. 3. Enter your [facility] Access Code exactly as it appears below. You will not need to use this code after youve completed the sign-up process. If you do not sign up before the expiration date, you must request a new code. [facility] Access Code:  Activation code not generated  Current [facility] Status: Active (This is the date your [facility] access code will )    4. Enter the last four digits of your Social Security Number (xxxx) and Date of Birth (mm/dd/yyyy) as indicated and click Submit. You will be taken to the next sign-up page. 5. Create a [facility] ID. This will be your [facility] login ID and cannot be changed, so think of one that is secure and easy to remember. 6. Create a [facility] password. You can change your password at any time. 7. Enter your Password Reset Question and Answer. This can be used at a later time if you forget your password. 8. Enter your e-mail address. You will receive e-mail notification when new information is available in [address] 9. Click Sign Up. You can now view and download portions of your medical record. 10. Click the Download Summary menu link to download a portable copy of your medical information. Additional Information    If you have questions, please visit the Frequently Asked Questions section of the [facility] website at [email or web address] Remember, [facility] is NOT to be used for urgent needs. For medical emergencies, dial 911. Follow-up and Dispositions    · Return in about 4 weeks (around 2020), or if symptoms worsen or fail to improve. I have reviewed with the patient details of the assessment and plan and all questions were answered. Relevent patient education was performed. The most recent lab findings were reviewed with the patient. An After Visit Summary was printed and given to the patient.

## 2020-07-01 DIAGNOSIS — E78.00 HYPERCHOLESTEREMIA: ICD-10-CM

## 2020-07-01 DIAGNOSIS — E11.9 CONTROLLED TYPE 2 DIABETES MELLITUS WITHOUT COMPLICATION, WITHOUT LONG-TERM CURRENT USE OF INSULIN (HCC): ICD-10-CM

## 2020-07-01 DIAGNOSIS — I10 ESSENTIAL HYPERTENSION, BENIGN: ICD-10-CM

## 2020-07-02 RX ORDER — LOSARTAN POTASSIUM 25 MG/1
TABLET ORAL
Qty: 30 TAB | Refills: 2 | Status: SHIPPED | OUTPATIENT
Start: 2020-07-02 | End: 2020-08-28

## 2020-07-02 RX ORDER — FENOFIBRATE 134 MG/1
CAPSULE ORAL
Qty: 30 CAP | Refills: 6 | Status: SHIPPED | OUTPATIENT
Start: 2020-07-02 | End: 2020-11-30

## 2020-07-23 RX ORDER — SUMATRIPTAN 100 MG/1
TABLET, FILM COATED ORAL
Qty: 8 TAB | Refills: 3 | Status: SHIPPED | OUTPATIENT
Start: 2020-07-23 | End: 2021-10-26 | Stop reason: ALTCHOICE

## 2020-08-04 ENCOUNTER — OFFICE VISIT (OUTPATIENT)
Dept: INTERNAL MEDICINE CLINIC | Age: 65
End: 2020-08-04
Payer: MEDICARE

## 2020-08-04 VITALS
OXYGEN SATURATION: 94 % | WEIGHT: 177 LBS | HEART RATE: 97 BPM | HEIGHT: 61 IN | DIASTOLIC BLOOD PRESSURE: 86 MMHG | BODY MASS INDEX: 33.42 KG/M2 | RESPIRATION RATE: 16 BRPM | SYSTOLIC BLOOD PRESSURE: 124 MMHG | TEMPERATURE: 98.8 F

## 2020-08-04 DIAGNOSIS — N95.9 MENOPAUSAL AND POSTMENOPAUSAL DISORDER: ICD-10-CM

## 2020-08-04 DIAGNOSIS — E78.00 HYPERCHOLESTEREMIA: ICD-10-CM

## 2020-08-04 DIAGNOSIS — E11.9 CONTROLLED TYPE 2 DIABETES MELLITUS WITHOUT COMPLICATION, WITHOUT LONG-TERM CURRENT USE OF INSULIN (HCC): Primary | ICD-10-CM

## 2020-08-04 DIAGNOSIS — I10 ESSENTIAL HYPERTENSION, BENIGN: ICD-10-CM

## 2020-08-04 LAB
CHOLEST SERPL-MCNC: 250 MG/DL
GLUCOSE POC: 233 MG/DL
HBA1C MFR BLD HPLC: 8.3 %
HDLC SERPL-MCNC: 60 MG/DL
LDL CHOLESTEROL POC: 161 MG/DL
NON-HDL CHOLESTEROL, 011976: 190
TCHOL/HDL RATIO (POC): 4.2
TRIGL SERPL-MCNC: 146 MG/DL

## 2020-08-04 PROCEDURE — 83036 HEMOGLOBIN GLYCOSYLATED A1C: CPT | Performed by: INTERNAL MEDICINE

## 2020-08-04 PROCEDURE — 80061 LIPID PANEL: CPT | Performed by: INTERNAL MEDICINE

## 2020-08-04 PROCEDURE — 3052F HG A1C>EQUAL 8.0%<EQUAL 9.0%: CPT | Performed by: INTERNAL MEDICINE

## 2020-08-04 PROCEDURE — 99214 OFFICE O/P EST MOD 30 MIN: CPT | Performed by: INTERNAL MEDICINE

## 2020-08-04 PROCEDURE — 82962 GLUCOSE BLOOD TEST: CPT | Performed by: INTERNAL MEDICINE

## 2020-08-04 RX ORDER — FLUCONAZOLE 100 MG/1
TABLET ORAL
Qty: 4 TAB | Refills: 1 | Status: SHIPPED | OUTPATIENT
Start: 2020-08-04 | End: 2021-10-26 | Stop reason: ALTCHOICE

## 2020-08-04 RX ORDER — CLOTRIMAZOLE AND BETAMETHASONE DIPROPIONATE 10; .64 MG/G; MG/G
CREAM TOPICAL 2 TIMES DAILY
Qty: 45 G | Refills: 3 | Status: SHIPPED | OUTPATIENT
Start: 2020-08-04

## 2020-08-04 RX ORDER — PANTOPRAZOLE SODIUM 40 MG/1
40 TABLET, DELAYED RELEASE ORAL DAILY
Qty: 30 TAB | Refills: 3 | Status: SHIPPED | OUTPATIENT
Start: 2020-08-04 | End: 2021-04-01 | Stop reason: SDUPTHER

## 2020-08-04 NOTE — PATIENT INSTRUCTIONS
[employer] Activation Thank you for requesting access to [facility]. Please follow the instructions below to securely access and download your online medical record. [facility] allows you to send messages to your doctor, view your test results, renew your prescriptions, schedule appointments, and more. How Do I Sign Up? 1. In your internet browser, go to [email or web address] 
2. Click on the First Time User? Click Here link in the Sign In box. You will be redirect to the New Member Sign Up page. 3. Enter your [facility] Access Code exactly as it appears below. You will not need to use this code after youve completed the sign-up process. If you do not sign up before the expiration date, you must request a new code. [facility] Access Code: Activation code not generated Current [facility] Status: Active (This is the date your [facility] access code will ) 4. Enter the last four digits of your Social Security Number (xxxx) and Date of Birth (mm/dd/yyyy) as indicated and click Submit. You will be taken to the next sign-up page. 5. Create a [facility] ID. This will be your [facility] login ID and cannot be changed, so think of one that is secure and easy to remember. 6. Create a [facility] password. You can change your password at any time. 7. Enter your Password Reset Question and Answer. This can be used at a later time if you forget your password. 8. Enter your e-mail address. You will receive e-mail notification when new information is available in [address] 9. Click Sign Up. You can now view and download portions of your medical record. 10. Click the Download Summary menu link to download a portable copy of your medical information. Additional Information If you have questions, please visit the Frequently Asked Questions section of the [facility] [employer] website at [email or web address] Remember, [facility] is NOT to be used for urgent needs. For medical emergencies, dial 911.

## 2020-08-04 NOTE — PROGRESS NOTES
Luiz Dawson is a 59 y.o. female and presents with Shoulder Pain; Diabetes; Hypertension; and Cholesterol Problem  . Subjective:  Shoulder Pain Review:  Patient complains of right side shoulder pain. The symptoms began several weeks ago Course of symptoms since onset has been gradually worsening. Pain is described as overall severity = moderate. Symptoms were incited by no known event. Patient denies N/A. Therapy to date includes OTC analgesics: effective. She has a rash in the inguinal region    Hypertension Review:  The patient has hypertension . Diet and Lifestyle: generally follows a low sodium diet, exercises sporadically  Home BP Monitoring: is not measured at home. Pertinent ROS: taking medications as instructed, no medication side effects noted, no TIA's, no chest pain on exertion, no dyspnea on exertion, no swelling of ankles. Dyslipidemia Review:  Patient presents for evaluation of lipids. Compliance with treatment thus far has been excellent. A repeat fasting lipid profile was not done. The patient does not use medications that may worsen dyslipidemias . The patient exercises sporadically. Diabetes Mellitus Review:  She has diabetes mellitus. Diabetic ROS - medication compliance: compliant all of the time, diabetic diet compliance: compliant all of the time, home glucose monitoring: is performed. Known diabetic complications: none  Cardiovascular risk factors: family history, dyslipidemia, diabetes mellitus, obesity, hypertension  Current diabetic medications include oral agents/insulin   Eye exam current (within one year): no  Weight trend: stable  Prior visit with dietician: no  Current diet: \"healthy\" diet  in general  Current exercise: walking  Current monitoring regimen: home blood tests - daily  Home blood sugar records: trend: stable  Any episodes of hypoglycemia? no  Lab review: orders written for new lab studies as appropriate; see orders.      GERD Review:   Patient has a history of gastroesophageal reflux with heartburn. Symptoms have been present for a few months. She denies dysphagia. She  has not lost weight. She denies melena, hematochezia, hematemesis, and coffee ground emesis. This has been associated with fullness after meals. She denies abdominal bloating and none. Medical therapy in the past has included proton pump inhibitor        Review of Systems  Constitutional: negative for fevers, chills, anorexia and weight loss  Eyes:   negative for visual disturbance and irritation  ENT:   negative for tinnitus,sore throat,nasal congestion,ear pains. hoarseness  Respiratory:  negative for cough, hemoptysis, dyspnea,wheezing  CV:   negative for chest pain, palpitations, lower extremity edema  GI:   negative for nausea, vomiting, diarrhea, abdominal pain,melena  Endo:               negative for polyuria,polydipsia,polyphagia,heat intolerance  Genitourinary: negative for frequency, dysuria and hematuria  Integument:  negative for rash and pruritus  Hematologic:  negative for easy bruising and gum/nose bleeding  Musculoskel: negative for myalgias, arthralgias, back pain, muscle weakness, joint pain  Neurological:  negative for headaches, dizziness, vertigo, memory problems and gait   Behavl/Psych: negative for feelings of anxiety, depression, mood changes    Past Medical History:   Diagnosis Date    Acute cholecystitis 2/9/2011    Acute cystitis 2/9/2011    Cholecystitis 2/9/2011    Cholelithiasis 2/9/2011    Chronic Pain 2/9/2011    Essential hypertension, benign 2/9/2011    GERD (gastroesophageal reflux disease)     GERD, Gastro- Esophageal Reflux Diease (acid reflux) 2/9/2011    Herniated nucleus pulposus 2/9/2011    Hypercholesteremia 2/9/2011    Hypercholesteremia 2/9/2011    Hypertension     Neurogenic bladder, NOS 2/9/2011     Past Surgical History:   Procedure Laterality Date    HX GYN      HX HAMMER TOE REPAIR  6/21/2016    left foot      Social History Socioeconomic History    Marital status:      Spouse name: Not on file    Number of children: Not on file    Years of education: Not on file    Highest education level: Not on file   Tobacco Use    Smoking status: Never Smoker    Smokeless tobacco: Never Used   Substance and Sexual Activity    Alcohol use: No    Drug use: No    Sexual activity: Yes     Partners: Male     Family History   Problem Relation Age of Onset    Heart Disease Mother     Cancer Father      Current Outpatient Medications   Medication Sig Dispense Refill    fenofibrate micronized (LOFIBRA) 134 mg capsule TAKE 1 CAPSULE BY MOUTH EVERY DAY 30 Cap 6    pantoprazole (PROTONIX) 40 mg tablet Take 1 Tab by mouth daily. 30 Tab 3    pregabalin (LYRICA) 150 mg capsule TAKE 1 CAPSULE BY MOUTH TWICE A DAY 60 Cap 3    metFORMIN (GLUCOPHAGE) 1,000 mg tablet TAKE 1 TABLET BY MOUTH TWICE A DAY 60 Tab 12    SITagliptin (JANUVIA) 100 mg tablet TAKE 1 TABLET BY MOUTH EVERY DAY 30 Tab 11    aspirin 81 mg tablet Take  by mouth.  SUMAtriptan (IMITREX) 100 mg tablet TAKE 1 TABLET BY MOUTH ONCE AS NEEDED FOR MIGRAINE FOR UP TO 1 DOSE (Patient not taking: Reported on 8/4/2020) 8 Tab 3    losartan (COZAAR) 25 mg tablet TAKE 1 TABLET BY MOUTH EVERY DAY 30 Tab 2    DULoxetine (CYMBALTA) 60 mg capsule Take 1 Cap by mouth daily. (Patient not taking: Reported on 8/4/2020) 30 Cap 3    celecoxib (CELEBREX) 200 mg capsule TAKE 1 CAPSULE BY MOUTH EVERY DAY (Patient not taking: Reported on 8/4/2020) 90 Cap 3    naloxone 2 mg/actuation spry Use 1 spray intranasally, then discard. Repeat with new spray every 2 min as needed for opioid overdose symptoms, alternating nostrils. (Patient not taking: Reported on 8/4/2020) 2 Syringe 0    pantoprazole (PROTONIX) 40 mg tablet TAKE ONE TABLET BY MOUTH TWICE A DAY (Patient not taking: Reported on 2/3/2020) 60 Tab 12    meloxicam (MOBIC) 15 mg tablet Take 1 Tab by mouth daily.  (Patient not taking: Reported on 8/4/2020) 30 Tab 12    fluticasone (FLONASE) 50 mcg/actuation nasal spray 2 Sprays by Both Nostrils route daily. (Patient not taking: Reported on 8/4/2020) 1 Bottle 12    cetirizine (ZYRTEC) 10 mg tablet Take 1 Tab by mouth daily. (Patient not taking: Reported on 8/4/2020) 30 Tab 3    irbesartan (AVAPRO) 150 mg tablet TAKE ONE TABLET EVERY NIGHT AT BEDTIME. 30 Tab 12    ipratropium (ATROVENT) 0.06 % nasal spray 2 Sprays by Both Nostrils route four (4) times daily. (Patient not taking: Reported on 2/3/2020) 15 mL 0    diphenoxylate-atropine (LOMOTIL) 2.5-0.025 mg per tablet Take 1 tablet by mouth four (4) times daily as needed for Diarrhea. 60 tablet 0     Allergies   Allergen Reactions    Diclofenac Itching       Objective:  Visit Vitals  /86   Pulse 97   Temp 98.8 °F (37.1 °C) (Oral)   Resp 16   Ht 5' 1\" (1.549 m)   Wt 177 lb (80.3 kg)   SpO2 94%   BMI 33.44 kg/m²     Physical Exam:   General appearance - alert, well appearing, and in no distress  Mental status - alert, oriented to person, place, and time  EYE-LAURI, EOMI, corneas normal, no foreign bodies  ENT-ENT exam normal, no neck nodes or sinus tenderness  Nose - normal and patent, no erythema, discharge or polyps  Mouth - mucous membranes moist, pharynx normal without lesions  Neck - supple, no significant adenopathy   Chest - clear to auscultation, no wheezes, rales or rhonchi, symmetric air entry   Heart - normal rate, regular rhythm, normal S1, S2, no murmurs, rubs, clicks or gallops   Abdomen - soft, nontender, nondistended, no masses or organomegaly  Lymph- no adenopathy palpable  Ext-peripheral pulses normal, no pedal edema, no clubbing or cyanosis  Skin-Warm and dry.  no hyperpigmentation, vitiligo, or suspicious lesions  Neuro -alert, oriented, normal speech, no focal findings or movement disorder noted  Neck-normal C-spine, no tenderness, full ROM without pain  Feet-no nail deformities or callus formation with good pulses noted  erythema inguinal region    Results for orders placed or performed in visit on 08/04/20   AMB POC LIPID PROFILE   Result Value Ref Range    Cholesterol (POC) 250     Triglycerides (POC) 146     HDL Cholesterol (POC) 60     Non-HDL Cholesterol 190     LDL Cholesterol (POC) 161 MG/DL    TChol/HDL Ratio (POC) 4.2    AMB POC GLUCOSE BLOOD, BY GLUCOSE MONITORING DEVICE   Result Value Ref Range    Glucose  mg/dL   AMB POC HEMOGLOBIN A1C   Result Value Ref Range    Hemoglobin A1c (POC) 8.3 %       Assessment/Plan:    ICD-10-CM ICD-9-CM    1. Controlled type 2 diabetes mellitus without complication, without long-term current use of insulin (HCC)  E11.9 250.00 AMB POC LIPID PROFILE      AMB POC GLUCOSE BLOOD, BY GLUCOSE MONITORING DEVICE      AMB POC HEMOGLOBIN A1C   2. Essential hypertension, benign  I10 401.1 AMB POC LIPID PROFILE      AMB POC GLUCOSE BLOOD, BY GLUCOSE MONITORING DEVICE      AMB POC HEMOGLOBIN A1C   3. Hypercholesteremia  E78.00 272.0 AMB POC LIPID PROFILE      AMB POC GLUCOSE BLOOD, BY GLUCOSE MONITORING DEVICE      AMB POC HEMOGLOBIN A1C     Orders Placed This Encounter    AMB POC LIPID PROFILE    AMB POC GLUCOSE BLOOD, BY GLUCOSE MONITORING DEVICE    AMB POC HEMOGLOBIN A1C     lose weight, follow low fat diet, follow low salt diet, continue present plan,Take 81mg aspirin daily  There are no Patient Instructions on file for this visit. I have reviewed with the patient details of the assessment and plan and all questions were answered. Relevent patient education was performed. The most recent lab findings were reviewed with the patient. An After Visit Summary was printed and given to the patient.

## 2020-08-04 NOTE — PROGRESS NOTES
1. Have you been to the ER, urgent care clinic since your last visit? Hospitalized since your last visit?no    2. Have you seen or consulted any other health care providers outside of the [address] [place] [address] since your last visit? Include any pap smears or colon screening.  No    3 most recent PHQ Screens 5/4/2020   PHQ Not Done -   Little interest or pleasure in doing things Not at all   Feeling down, depressed, irritable, or hopeless Not at all   Total Score PHQ 2 0     Chief Complaint   Patient presents with    Shoulder Pain

## 2020-08-06 ENCOUNTER — TELEPHONE (OUTPATIENT)
Dept: INTERNAL MEDICINE CLINIC | Age: 65
End: 2020-08-06

## 2020-08-28 DIAGNOSIS — E78.00 HYPERCHOLESTEREMIA: ICD-10-CM

## 2020-08-28 DIAGNOSIS — E11.9 CONTROLLED TYPE 2 DIABETES MELLITUS WITHOUT COMPLICATION, WITHOUT LONG-TERM CURRENT USE OF INSULIN (HCC): ICD-10-CM

## 2020-08-28 DIAGNOSIS — I10 ESSENTIAL HYPERTENSION, BENIGN: ICD-10-CM

## 2020-08-28 RX ORDER — LOSARTAN POTASSIUM 25 MG/1
TABLET ORAL
Qty: 30 TAB | Refills: 2 | Status: SHIPPED | OUTPATIENT
Start: 2020-08-28 | End: 2020-11-30

## 2020-08-31 DIAGNOSIS — G51.9 FACIAL NEUROPATHY: ICD-10-CM

## 2020-08-31 RX ORDER — DULOXETIN HYDROCHLORIDE 60 MG/1
CAPSULE, DELAYED RELEASE ORAL
Qty: 30 CAP | Refills: 3 | Status: SHIPPED | OUTPATIENT
Start: 2020-08-31 | End: 2021-10-26 | Stop reason: ALTCHOICE

## 2020-09-01 ENCOUNTER — OFFICE VISIT (OUTPATIENT)
Dept: INTERNAL MEDICINE CLINIC | Age: 65
End: 2020-09-01
Payer: MEDICARE

## 2020-09-01 VITALS
HEIGHT: 61 IN | HEART RATE: 81 BPM | BODY MASS INDEX: 33.23 KG/M2 | RESPIRATION RATE: 16 BRPM | DIASTOLIC BLOOD PRESSURE: 70 MMHG | OXYGEN SATURATION: 96 % | WEIGHT: 176 LBS | SYSTOLIC BLOOD PRESSURE: 128 MMHG | TEMPERATURE: 99.2 F

## 2020-09-01 DIAGNOSIS — M48.062 SPINAL STENOSIS, LUMBAR REGION WITH NEUROGENIC CLAUDICATION: ICD-10-CM

## 2020-09-01 DIAGNOSIS — Z12.11 SCREENING FOR COLON CANCER: ICD-10-CM

## 2020-09-01 DIAGNOSIS — I10 ESSENTIAL HYPERTENSION, BENIGN: ICD-10-CM

## 2020-09-01 DIAGNOSIS — N95.9 MENOPAUSAL AND POSTMENOPAUSAL DISORDER: ICD-10-CM

## 2020-09-01 DIAGNOSIS — M12.811 ROTATOR CUFF ARTHROPATHY OF RIGHT SHOULDER: ICD-10-CM

## 2020-09-01 DIAGNOSIS — Z12.39 SCREENING FOR BREAST CANCER: ICD-10-CM

## 2020-09-01 DIAGNOSIS — E78.00 HYPERCHOLESTEREMIA: ICD-10-CM

## 2020-09-01 DIAGNOSIS — E11.9 CONTROLLED TYPE 2 DIABETES MELLITUS WITHOUT COMPLICATION, WITHOUT LONG-TERM CURRENT USE OF INSULIN (HCC): Primary | ICD-10-CM

## 2020-09-01 LAB — GLUCOSE POC: 140 MG/DL

## 2020-09-01 PROCEDURE — 99214 OFFICE O/P EST MOD 30 MIN: CPT | Performed by: INTERNAL MEDICINE

## 2020-09-01 PROCEDURE — 82962 GLUCOSE BLOOD TEST: CPT | Performed by: INTERNAL MEDICINE

## 2020-09-01 PROCEDURE — 90000 TRIAMCINOLONE ACETONIDE INJ: CPT | Performed by: INTERNAL MEDICINE

## 2020-09-01 PROCEDURE — 20605 DRAIN/INJ JOINT/BURSA W/O US: CPT | Performed by: INTERNAL MEDICINE

## 2020-09-01 PROCEDURE — 3052F HG A1C>EQUAL 8.0%<EQUAL 9.0%: CPT | Performed by: INTERNAL MEDICINE

## 2020-09-01 RX ORDER — TRIAMCINOLONE ACETONIDE 40 MG/ML
40 INJECTION, SUSPENSION INTRA-ARTICULAR; INTRAMUSCULAR ONCE
Qty: 1 ML | Refills: 0
Start: 2020-09-01 | End: 2020-09-01

## 2020-09-01 RX ORDER — LIDOCAINE HYDROCHLORIDE 20 MG/ML
2 INJECTION, SOLUTION EPIDURAL; INFILTRATION; INTRACAUDAL; PERINEURAL ONCE
Qty: 2 ML | Refills: 0
Start: 2020-09-01 | End: 2020-09-01

## 2020-09-01 NOTE — PATIENT INSTRUCTIONS
[employer] Activation Thank you for requesting access to [employer]. Please follow the instructions below to securely access and download your online medical record. [employer] allows you to send messages to your doctor, view your test results, renew your prescriptions, schedule appointments, and more. How Do I Sign Up? 1. In your internet browser, go to [email or web address] 
2. Click on the First Time User? Click Here link in the Sign In box. You will be redirect to the New Member Sign Up page. 3. Enter your [employer] Access Code exactly as it appears below. You will not need to use this code after youve completed the sign-up process. If you do not sign up before the expiration date, you must request a new code. [employer] Access Code: Activation code not generated Current [employer] Status: Active (This is the date your [employer] access code will ) 4. Enter the last four digits of your Social Security Number (xxxx) and Date of Birth (mm/dd/yyyy) as indicated and click Submit. You will be taken to the next sign-up page. 5. Create a [employer] ID. This will be your [employer] login ID and cannot be changed, so think of one that is secure and easy to remember. 6. Create a [employer] password. You can change your password at any time. 7. Enter your Password Reset Question and Answer. This can be used at a later time if you forget your password. 8. Enter your e-mail address. You will receive e-mail notification when new information is available in [address] 9. Click Sign Up. You can now view and download portions of your medical record. 10. Click the Download Summary menu link to download a portable copy of your medical information. Additional Information If you have questions, please visit the Frequently Asked Questions section of the [employer] website at [email or web address] Remember, [employer] is NOT to be used for urgent needs. For medical emergencies, dial 911.

## 2020-09-01 NOTE — PROGRESS NOTES
Lamont Torres is a 72 y.o. female and presents with Shoulder Pain  . Subjective:  Shoulder Pain Review:  Patient complains of right side shoulder pain. The symptoms began several weeks ago Course of symptoms since onset has been gradually worsening. Pain is described as overall severity = moderate. Symptoms were incited by no known event. Patient denies N/A. Therapy to date includes OTC analgesics: effective. Hypertension Review:  The patient has hypertension . Diet and Lifestyle: generally follows a low sodium diet, exercises sporadically  Home BP Monitoring: is not measured at home. Pertinent ROS: taking medications as instructed, no medication side effects noted, no TIA's, no chest pain on exertion, no dyspnea on exertion, no swelling of ankles. Dyslipidemia Review:  Patient presents for evaluation of lipids. Compliance with treatment thus far has been excellent. A repeat fasting lipid profile was not done. The patient does not use medications that may worsen dyslipidemias . The patient exercises sporadically. Diabetes Mellitus Review:  She has diabetes mellitus. Diabetic ROS - medication compliance: compliant all of the time, diabetic diet compliance: compliant all of the time, home glucose monitoring: is performed. Known diabetic complications: none  Cardiovascular risk factors: family history, dyslipidemia, diabetes mellitus, obesity, hypertension  Current diabetic medications include oral agents/insulin   Eye exam current (within one year): no  Weight trend: stable  Prior visit with dietician: no  Current diet: \"healthy\" diet  in general  Current exercise: walking  Current monitoring regimen: home blood tests - daily  Home blood sugar records: trend: stable  Any episodes of hypoglycemia? no  Lab review: orders written for new lab studies as appropriate; see orders. GERD Review:   Patient has a history of gastroesophageal reflux with heartburn.  Symptoms have been present for a few months. She denies dysphagia. She  has not lost weight. She denies melena, hematochezia, hematemesis, and coffee ground emesis. This has been associated with fullness after meals. She denies abdominal bloating and none. Medical therapy in the past has included proton pump inhibitor    Health maintenance suggests the needs for a DEXA BONE DENSITY SCAN    Health maintenance suggests the needs for a test for occult blood    Health maintenance suggests the needs for a mammogram      Review of Systems  Constitutional: negative for fevers, chills, anorexia and weight loss  Eyes:   negative for visual disturbance and irritation  ENT:   negative for tinnitus,sore throat,nasal congestion,ear pains. hoarseness  Respiratory:  negative for cough, hemoptysis, dyspnea,wheezing  CV:   negative for chest pain, palpitations, lower extremity edema  GI:   negative for nausea, vomiting, diarrhea, abdominal pain,melena  Endo:               negative for polyuria,polydipsia,polyphagia,heat intolerance  Genitourinary: negative for frequency, dysuria and hematuria  Integument:  negative for rash and pruritus  Hematologic:  negative for easy bruising and gum/nose bleeding  Musculoskel: myalgias, arthralgias, back pain, muscle weakness, joint pain  Neurological:  negative for headaches, dizziness, vertigo, memory problems and gait   Behavl/Psych: feelings of anxiety, depression, mood changes    Past Medical History:   Diagnosis Date    Acute cholecystitis 2/9/2011    Acute cystitis 2/9/2011    Cholecystitis 2/9/2011    Cholelithiasis 2/9/2011    Chronic Pain 2/9/2011    Essential hypertension, benign 2/9/2011    GERD (gastroesophageal reflux disease)     GERD, Gastro- Esophageal Reflux Diease (acid reflux) 2/9/2011    Herniated nucleus pulposus 2/9/2011    Hypercholesteremia 2/9/2011    Hypercholesteremia 2/9/2011    Hypertension     Neurogenic bladder, NOS 2/9/2011     Past Surgical History:   Procedure Laterality Date    HX GYN      HX HAMMER TOE REPAIR  2016    left foot      Social History     Socioeconomic History    Marital status:      Spouse name: Not on file    Number of children: Not on file    Years of education: Not on file    Highest education level: Not on file   Tobacco Use    Smoking status: Never Smoker    Smokeless tobacco: Never Used   Substance and Sexual Activity    Alcohol use: No    Drug use: No    Sexual activity: Yes     Partners: Male     Family History   Problem Relation Age of Onset    Heart Disease Mother     Cancer Father      Current Outpatient Medications   Medication Sig Dispense Refill    SITagliptin (Januvia) 100 mg tablet TAKE 1 TABLET BY MOUTH EVERY DAY 30 Tab 11    losartan (COZAAR) 25 mg tablet TAKE 1 TABLET BY MOUTH EVERY DAY 30 Tab 2    clotrimazole-betamethasone (LOTRISONE) topical cream Apply  to affected area two (2) times a day. 45 g 3    pantoprazole (PROTONIX) 40 mg tablet Take 1 Tab by mouth daily. 30 Tab 3    SUMAtriptan (IMITREX) 100 mg tablet TAKE 1 TABLET BY MOUTH ONCE AS NEEDED FOR MIGRAINE FOR UP TO 1 DOSE 8 Tab 3    fenofibrate micronized (LOFIBRA) 134 mg capsule TAKE 1 CAPSULE BY MOUTH EVERY DAY 30 Cap 6    pregabalin (LYRICA) 150 mg capsule TAKE 1 CAPSULE BY MOUTH TWICE A DAY 60 Cap 3    metFORMIN (GLUCOPHAGE) 1,000 mg tablet TAKE 1 TABLET BY MOUTH TWICE A DAY 60 Tab 12    celecoxib (CELEBREX) 200 mg capsule TAKE 1 CAPSULE BY MOUTH EVERY DAY 90 Cap 3    aspirin 81 mg tablet Take  by mouth.  DULoxetine (CYMBALTA) 60 mg capsule TAKE 1 CAPSULE BY MOUTH EVERY DAY 30 Cap 3    fluconazole (DIFLUCAN) 100 mg tablet Si tab daily for 4 week 4 Tab 1    naloxone 2 mg/actuation spry Use 1 spray intranasally, then discard. Repeat with new spray every 2 min as needed for opioid overdose symptoms, alternating nostrils. (Patient not taking: Reported on 2020) 2 Syringe 0    meloxicam (MOBIC) 15 mg tablet Take 1 Tab by mouth daily.  (Patient not taking: Reported on 8/4/2020) 30 Tab 12    fluticasone (FLONASE) 50 mcg/actuation nasal spray 2 Sprays by Both Nostrils route daily. (Patient not taking: Reported on 8/4/2020) 1 Bottle 12    cetirizine (ZYRTEC) 10 mg tablet Take 1 Tab by mouth daily. 30 Tab 3    irbesartan (AVAPRO) 150 mg tablet TAKE ONE TABLET EVERY NIGHT AT BEDTIME. 30 Tab 12    ipratropium (ATROVENT) 0.06 % nasal spray 2 Sprays by Both Nostrils route four (4) times daily. (Patient not taking: Reported on 2/3/2020) 15 mL 0    diphenoxylate-atropine (LOMOTIL) 2.5-0.025 mg per tablet Take 1 tablet by mouth four (4) times daily as needed for Diarrhea. 60 tablet 0     Allergies   Allergen Reactions    Diclofenac Itching       Objective:  Visit Vitals  /70 (BP 1 Location: Left arm, BP Patient Position: Sitting)   Pulse 81   Temp 99.2 °F (37.3 °C) (Oral)   Resp 16   Ht 5' 1\" (1.549 m)   Wt 176 lb (79.8 kg)   SpO2 96%   BMI 33.25 kg/m²     Physical Exam:   General appearance - alert, well appearing, and in no distress  Mental status - alert, oriented to person, place, and time  EYE-LAURI, EOMI, corneas normal, no foreign bodies  ENT-ENT exam normal, no neck nodes or sinus tenderness  Nose - normal and patent, no erythema, discharge or polyps  Mouth - mucous membranes moist, pharynx normal without lesions  Neck - supple, no significant adenopathy   Chest - clear to auscultation, no wheezes, rales or rhonchi, symmetric air entry   Heart - normal rate, regular rhythm, normal S1, S2, no murmurs, rubs, clicks or gallops   Abdomen - soft, nontender, nondistended, no masses or organomegaly  Lymph- no adenopathy palpable  Ext-peripheral pulses normal, no pedal edema, no clubbing or cyanosis  Skin-Warm and dry.  no hyperpigmentation, vitiligo, or suspicious lesions  Neuro -alert, oriented, normal speech, no focal findings or movement disorder noted  Neck-normal C-spine, no tenderness, full ROM without pain  Feet-no nail deformities or callus formation with good pulses noted  erythema inguinal region    Results for orders placed or performed in visit on 09/01/20   AMB POC GLUCOSE BLOOD, BY GLUCOSE MONITORING DEVICE   Result Value Ref Range    Glucose  mg/dL       Assessment/Plan:    ICD-10-CM ICD-9-CM    1. Controlled type 2 diabetes mellitus without complication, without long-term current use of insulin (HCC)  E11.9 250.00 AMB POC GLUCOSE BLOOD, BY GLUCOSE MONITORING DEVICE   2. Screening for breast cancer  Z12.39 V76.10 CHRISTINE MAMMO BI SCREENING INCL CAD   3. Rotator cuff arthropathy of right shoulder  M12.811 716.81 UT DRAIN/INJECT INTERMEDIATE JOINT/BURSA   4. Essential hypertension, benign  K94 514.6 METABOLIC PANEL, COMPREHENSIVE      CBC W/O DIFF   5. Hypercholesteremia  E78.00 272.0    6. Spinal stenosis, lumbar region with neurogenic claudication  M48.062 724.03    7. Screening for colon cancer  Z12.11 V76.51 OCCULT BLOOD IMMUNOASSAY,DIAGNOSTIC   8. Menopausal and postmenopausal disorder  N95.9 627.9 DEXA BONE DENSITY STUDY AXIAL     Orders Placed This Encounter    CHRISTINE MAMMO BI SCREENING INCL CAD     Standing Status:   Future     Standing Expiration Date:   10/1/2020     Order Specific Question:   Reason for Exam     Answer:   breast cancer screening    DEXA BONE DENSITY STUDY AXIAL     Standing Status:   Future     Standing Expiration Date:   10/1/2020    OCCULT BLOOD IMMUNOASSAY,DIAGNOSTIC     Standing Status:   Future     Standing Expiration Date:   9/6/3338    METABOLIC PANEL, COMPREHENSIVE     Standing Status:   Future     Standing Expiration Date:   9/1/2021    CBC W/O DIFF     Standing Status:   Future     Standing Expiration Date:   9/1/2021    AMB POC GLUCOSE BLOOD, BY GLUCOSE MONITORING DEVICE    UT DRAIN/INJECT INTERMEDIATE JOINT/BURSA     lose weight, follow low fat diet, follow low salt diet, continue present plan,Take 81mg aspirin daily  Patient Instructions   [facility] Activation    Thank you for requesting access to [facility]. Please follow the instructions below to securely access and download your online medical record. [facility] allows you to send messages to your doctor, view your test results, renew your prescriptions, schedule appointments, and more. How Do I Sign Up? 1. In your internet browser, go to [email or web address]  2. Click on the First Time User? Click Here link in the Sign In box. You will be redirect to the New Member Sign Up page. 3. Enter your [facility] Access Code exactly as it appears below. You will not need to use this code after youve completed the sign-up process. If you do not sign up before the expiration date, you must request a new code. [facility] Access Code: Activation code not generated  Current [facility] Status: Active (This is the date your [facility] access code will )    4. Enter the last four digits of your Social Security Number (xxxx) and Date of Birth (mm/dd/yyyy) as indicated and click Submit. You will be taken to the next sign-up page. 5. Create a [facility] ID. This will be your [facility] login ID and cannot be changed, so think of one that is secure and easy to remember. 6. Create a [facility] password. You can change your password at any time. 7. Enter your Password Reset Question and Answer. This can be used at a later time if you forget your password. 8. Enter your e-mail address. You will receive e-mail notification when new information is available in [address] 9. Click Sign Up. You can now view and download portions of your medical record. 10. Click the Download Summary menu link to download a portable copy of your medical information. Additional Information    If you have questions, please visit the Frequently Asked Questions section of the [facility] website at [email or web address] Remember, [facility] is NOT to be used for urgent needs. For medical emergencies, dial 911.              Indications:   Symptomatic relief of pain    Procedure:  After consent was obtained, using sterile technique the right shoulder joint was prepped using alcohol. Local anesthetic used: 1% lidocaine. . The joint was entered and Kenalog 40 mg was mixed with 1% lidocaine 3 ml  and injected into the joint and the needle withdrawn. The procedure was well tolerated. The patient is asked to continue to rest the joint for a few more days before resuming regular activities. It may be more painful for the first 1-2 days. Watch for fever, or increased swelling or persistent pain in the joint. Call or return to clinic prn if such symptoms occur or there is failure to improve as anticipated. [facility]  OFFICE PROCEDURE PROGRESS NOTE        Chart reviewed for the following:   Pavel Kwok MD, have reviewed the History, Physical and updated the Allergic reactions for [address] performed immediately prior to start of procedure:   Pavel Kwok MD, have performed the following reviews on Dureleno Shearing prior to the start of the procedure:            * Patient was identified by name and date of birth   * Agreement on procedure being performed was verified  * Risks and Benefits explained to the patient  * Procedure site verified and marked as necessary  * Patient was positioned for comfort       Time: 3:22pm      Date of procedure: 9/1/2020    Procedure performed by:  Adela Lombardi MD    Patient assisted by: nursing attendant    How tolerated by patient: tolerated the procedure well with no complications    Comments: none                Follow-up and Dispositions    · Return in about 3 months (around 12/1/2020), or if symptoms worsen or fail to improve. I have reviewed with the patient details of the assessment and plan and all questions were answered. Relevent patient education was performed. The most recent lab findings were reviewed with the patient.     An After Visit Summary was printed and given to the patient.

## 2020-09-01 NOTE — PROGRESS NOTES
Chief Complaint   Patient presents with    Shoulder Pain     1. Have you been to the ER, urgent care clinic since your last visit? Hospitalized since your last visit? No    2. Have you seen or consulted any other health care providers outside of the [address] [place] [address] since your last visit? Include any pap smears or colon screening.  No

## 2020-09-02 LAB
ALBUMIN SERPL-MCNC: 4.6 G/DL (ref 3.8–4.8)
ALBUMIN/GLOB SERPL: 1.9 {RATIO} (ref 1.2–2.2)
ALP SERPL-CCNC: 101 IU/L (ref 39–117)
ALT SERPL-CCNC: 50 IU/L (ref 0–32)
AST SERPL-CCNC: 40 IU/L (ref 0–40)
BILIRUB SERPL-MCNC: 0.3 MG/DL (ref 0–1.2)
BUN SERPL-MCNC: 15 MG/DL (ref 8–27)
BUN/CREAT SERPL: 16 (ref 12–28)
CALCIUM SERPL-MCNC: 10.3 MG/DL (ref 8.7–10.3)
CHLORIDE SERPL-SCNC: 102 MMOL/L (ref 96–106)
CO2 SERPL-SCNC: 25 MMOL/L (ref 20–29)
CREAT SERPL-MCNC: 0.94 MG/DL (ref 0.57–1)
ERYTHROCYTE [DISTWIDTH] IN BLOOD BY AUTOMATED COUNT: 13.5 % (ref 11.7–15.4)
GLOBULIN SER CALC-MCNC: 2.4 G/DL (ref 1.5–4.5)
GLUCOSE SERPL-MCNC: 136 MG/DL (ref 65–99)
HCT VFR BLD AUTO: 40.2 % (ref 34–46.6)
HGB BLD-MCNC: 12.9 G/DL (ref 11.1–15.9)
MCH RBC QN AUTO: 27.4 PG (ref 26.6–33)
MCHC RBC AUTO-ENTMCNC: 32.1 G/DL (ref 31.5–35.7)
MCV RBC AUTO: 86 FL (ref 79–97)
PLATELET # BLD AUTO: 432 X10E3/UL (ref 150–450)
POTASSIUM SERPL-SCNC: 5.2 MMOL/L (ref 3.5–5.2)
PROT SERPL-MCNC: 7 G/DL (ref 6–8.5)
RBC # BLD AUTO: 4.7 X10E6/UL (ref 3.77–5.28)
SODIUM SERPL-SCNC: 138 MMOL/L (ref 134–144)
WBC # BLD AUTO: 5 X10E3/UL (ref 3.4–10.8)

## 2020-09-04 NOTE — PROGRESS NOTES
Verbal order, per DR Betty Suggs. SIGNED  change expiration date on bone density test from 10/1/2020. New date will  6 months from today.

## 2020-09-12 LAB — HEMOCCULT STL QL IA: NEGATIVE

## 2020-09-22 ENCOUNTER — HOSPITAL ENCOUNTER (OUTPATIENT)
Dept: BONE DENSITY | Age: 65
Discharge: HOME | End: 2020-09-22
Attending: INTERNAL MEDICINE
Payer: MEDICARE

## 2020-09-22 DIAGNOSIS — N95.9 MENOPAUSAL AND POSTMENOPAUSAL DISORDER: ICD-10-CM

## 2020-09-22 PROCEDURE — 77080 DXA BONE DENSITY AXIAL: CPT

## 2020-10-12 ENCOUNTER — HOSPITAL ENCOUNTER (OUTPATIENT)
Dept: MAMMOGRAPHY | Age: 65
Discharge: HOME | End: 2020-10-12
Attending: INTERNAL MEDICINE
Payer: MEDICARE

## 2020-10-12 DIAGNOSIS — Z12.31 VISIT FOR SCREENING MAMMOGRAM: ICD-10-CM

## 2020-10-12 PROCEDURE — 77067 SCR MAMMO BI INCL CAD: CPT

## 2020-10-13 ENCOUNTER — TELEPHONE (OUTPATIENT)
Dept: INTERNAL MEDICINE CLINIC | Age: 65
End: 2020-10-13

## 2020-11-09 RX ORDER — CELECOXIB 200 MG/1
CAPSULE ORAL
Qty: 30 CAP | Refills: 11 | Status: SHIPPED | OUTPATIENT
Start: 2020-11-09 | End: 2021-07-26 | Stop reason: SDUPTHER

## 2020-11-28 DIAGNOSIS — E11.9 CONTROLLED TYPE 2 DIABETES MELLITUS WITHOUT COMPLICATION, WITHOUT LONG-TERM CURRENT USE OF INSULIN (HCC): ICD-10-CM

## 2020-11-28 DIAGNOSIS — E78.00 HYPERCHOLESTEREMIA: ICD-10-CM

## 2020-11-28 DIAGNOSIS — I10 ESSENTIAL HYPERTENSION, BENIGN: ICD-10-CM

## 2020-11-30 RX ORDER — PREGABALIN 150 MG/1
CAPSULE ORAL
Qty: 60 CAP | Refills: 0 | Status: SHIPPED | OUTPATIENT
Start: 2020-11-30 | End: 2021-02-03 | Stop reason: SDUPTHER

## 2020-11-30 RX ORDER — FENOFIBRATE 134 MG/1
CAPSULE ORAL
Qty: 90 CAP | Refills: 2 | Status: SHIPPED | OUTPATIENT
Start: 2020-11-30 | End: 2021-07-26 | Stop reason: SDUPTHER

## 2020-11-30 RX ORDER — LOSARTAN POTASSIUM 25 MG/1
TABLET ORAL
Qty: 90 TAB | Refills: 0 | Status: SHIPPED | OUTPATIENT
Start: 2020-11-30 | End: 2021-04-01 | Stop reason: SDUPTHER

## 2020-12-01 ENCOUNTER — VIRTUAL VISIT (OUTPATIENT)
Dept: INTERNAL MEDICINE CLINIC | Age: 65
End: 2020-12-01
Payer: MEDICARE

## 2020-12-01 DIAGNOSIS — I10 ESSENTIAL HYPERTENSION, BENIGN: ICD-10-CM

## 2020-12-01 DIAGNOSIS — E11.9 CONTROLLED TYPE 2 DIABETES MELLITUS WITHOUT COMPLICATION, WITHOUT LONG-TERM CURRENT USE OF INSULIN (HCC): ICD-10-CM

## 2020-12-01 DIAGNOSIS — E78.00 HYPERCHOLESTEREMIA: Primary | ICD-10-CM

## 2020-12-01 PROCEDURE — 1090F PRES/ABSN URINE INCON ASSESS: CPT | Performed by: INTERNAL MEDICINE

## 2020-12-01 PROCEDURE — G8428 CUR MEDS NOT DOCUMENT: HCPCS | Performed by: INTERNAL MEDICINE

## 2020-12-01 PROCEDURE — 2022F DILAT RTA XM EVC RTNOPTHY: CPT | Performed by: INTERNAL MEDICINE

## 2020-12-01 PROCEDURE — G9899 SCRN MAM PERF RSLTS DOC: HCPCS | Performed by: INTERNAL MEDICINE

## 2020-12-01 PROCEDURE — G8432 DEP SCR NOT DOC, RNG: HCPCS | Performed by: INTERNAL MEDICINE

## 2020-12-01 PROCEDURE — G8399 PT W/DXA RESULTS DOCUMENT: HCPCS | Performed by: INTERNAL MEDICINE

## 2020-12-01 PROCEDURE — G8756 NO BP MEASURE DOC: HCPCS | Performed by: INTERNAL MEDICINE

## 2020-12-01 PROCEDURE — 1101F PT FALLS ASSESS-DOCD LE1/YR: CPT | Performed by: INTERNAL MEDICINE

## 2020-12-01 PROCEDURE — 3052F HG A1C>EQUAL 8.0%<EQUAL 9.0%: CPT | Performed by: INTERNAL MEDICINE

## 2020-12-01 PROCEDURE — 3017F COLORECTAL CA SCREEN DOC REV: CPT | Performed by: INTERNAL MEDICINE

## 2020-12-01 PROCEDURE — 99214 OFFICE O/P EST MOD 30 MIN: CPT | Performed by: INTERNAL MEDICINE

## 2020-12-01 NOTE — PROGRESS NOTES
Soumya Rizo is a 72 y.o. female being evaluated by a Virtual Visit (video visit) encounter to address concerns as mentioned above. A caregiver was present when appropriate. Due to this being a TeleHealth encounter (During WGJIU-53 public health emergency), evaluation of the following organ systems was limited: Vitals/Constitutional/EENT/Resp/CV/GI//MS/Neuro/Skin/Heme-Lymph-Imm. Pursuant to the emergency declaration under the [address] [place] [address] and the [employer] Act, this Virtual Visit was conducted with patient's (and/or legal guardian's) consent, to reduce the risk of exposure to COVID-19 and provide necessary medical care. Services were provided through a video synchronous discussion virtually to substitute for in-person encounter. --Dakota Franco MD on 12/1/2020 at 3:13 PM    An electronic signature was used to authenticate this note. Soumya Rizo is a 72 y.o. female and presents with Diabetes; Cholesterol Problem; and Hypertension  . Subjective:    Hypertension Review:  The patient has hypertension . Diet and Lifestyle: generally follows a low sodium diet, exercises sporadically  Home BP Monitoring: is not measured at home. Pertinent ROS: taking medications as instructed, no medication side effects noted, no TIA's, no chest pain on exertion, no dyspnea on exertion, no swelling of ankles. Dyslipidemia Review:  Patient presents for evaluation of lipids. Compliance with treatment thus far has been excellent. A repeat fasting lipid profile was not done. The patient does not use medications that may worsen dyslipidemias . The patient exercises sporadically. Diabetes Mellitus Review:  She has diabetes mellitus. Diabetic ROS - medication compliance: compliant all of the time, diabetic diet compliance: compliant all of the time, home glucose monitoring: is performed.    Known diabetic complications: none  Cardiovascular risk factors: family history, dyslipidemia, diabetes mellitus, obesity, hypertension  Current diabetic medications include oral agents/insulin   Eye exam current (within one year): no  Weight trend: stable  Prior visit with dietician: no  Current diet: \"healthy\" diet  in general  Current exercise: walking  Current monitoring regimen: home blood tests - daily  Home blood sugar records: trend: stable  Any episodes of hypoglycemia? no  Lab review: orders written for new lab studies as appropriate; see orders. She states she is to have back surgery soon    Review of Systems  Constitutional: negative for fevers, chills, anorexia and weight loss  Eyes:   negative for visual disturbance and irritation  ENT:   negative for tinnitus,sore throat,nasal congestion,ear pains. hoarseness  Respiratory:  negative for cough, hemoptysis, dyspnea,wheezing  CV:   negative for chest pain, palpitations, lower extremity edema  GI:   negative for nausea, vomiting, diarrhea, abdominal pain,melena  Endo:               negative for polyuria,polydipsia,polyphagia,heat intolerance  Genitourinary: negative for frequency, dysuria and hematuria  Integument:  negative for rash and pruritus  Hematologic:  negative for easy bruising and gum/nose bleeding  Musculoskel: myalgias, arthralgias, back pain,  joint pain  Neurological:  negative for headaches, dizziness, vertigo, memory problems and gait   Behavl/Psych: negative for feelings of anxiety, depression, mood changes    Past Medical History:   Diagnosis Date    Acute cholecystitis 2/9/2011    Acute cystitis 2/9/2011    Cholecystitis 2/9/2011    Cholelithiasis 2/9/2011    Chronic Pain 2/9/2011    Essential hypertension, benign 2/9/2011    GERD (gastroesophageal reflux disease)     GERD, Gastro- Esophageal Reflux Diease (acid reflux) 2/9/2011    Herniated nucleus pulposus 2/9/2011    Hypercholesteremia 2/9/2011    Hypercholesteremia 2/9/2011  Hypertension     Neurogenic bladder, NOS 2011     Past Surgical History:   Procedure Laterality Date    HX GYN      HX HAMMER TOE REPAIR  2016    left foot      Social History     Socioeconomic History    Marital status:      Spouse name: Not on file    Number of children: Not on file    Years of education: Not on file    Highest education level: Not on file   Tobacco Use    Smoking status: Never Smoker    Smokeless tobacco: Never Used   Substance and Sexual Activity    Alcohol use: No    Drug use: No    Sexual activity: Yes     Partners: Male     Family History   Problem Relation Age of Onset    Heart Disease Mother     Cancer Father      Current Outpatient Medications   Medication Sig Dispense Refill    losartan (COZAAR) 25 mg tablet TAKE 1 TABLET BY MOUTH EVERY DAY 90 Tab 0    fenofibrate micronized (LOFIBRA) 134 mg capsule TAKE 1 CAPSULE BY MOUTH EVERY DAY 90 Cap 2    pregabalin (LYRICA) 150 mg capsule TAKE 1 CAPSULE BY MOUTH TWICE A DAY 60 Cap 0    celecoxib (CELEBREX) 200 mg capsule TAKE 1 CAPSULE BY MOUTH EVERY DAY 30 Cap 11    SITagliptin (Januvia) 100 mg tablet TAKE 1 TABLET BY MOUTH EVERY DAY 30 Tab 11    clotrimazole-betamethasone (LOTRISONE) topical cream Apply  to affected area two (2) times a day. 45 g 3    pantoprazole (PROTONIX) 40 mg tablet Take 1 Tab by mouth daily. 30 Tab 3    metFORMIN (GLUCOPHAGE) 1,000 mg tablet TAKE 1 TABLET BY MOUTH TWICE A DAY 60 Tab 12    cetirizine (ZYRTEC) 10 mg tablet Take 1 Tab by mouth daily. 30 Tab 3    irbesartan (AVAPRO) 150 mg tablet TAKE ONE TABLET EVERY NIGHT AT BEDTIME. 30 Tab 12    aspirin 81 mg tablet Take  by mouth.       DULoxetine (CYMBALTA) 60 mg capsule TAKE 1 CAPSULE BY MOUTH EVERY DAY (Patient not taking: Reported on 2020) 30 Cap 3    fluconazole (DIFLUCAN) 100 mg tablet Si tab daily for 4 week 4 Tab 1    SUMAtriptan (IMITREX) 100 mg tablet TAKE 1 TABLET BY MOUTH ONCE AS NEEDED FOR MIGRAINE FOR UP TO 1 DOSE (Patient not taking: Reported on 12/1/2020) 8 Tab 3    naloxone 2 mg/actuation spry Use 1 spray intranasally, then discard. Repeat with new spray every 2 min as needed for opioid overdose symptoms, alternating nostrils. (Patient not taking: Reported on 8/4/2020) 2 Syringe 0    meloxicam (MOBIC) 15 mg tablet Take 1 Tab by mouth daily. (Patient not taking: Reported on 8/4/2020) 30 Tab 12    fluticasone (FLONASE) 50 mcg/actuation nasal spray 2 Sprays by Both Nostrils route daily. (Patient not taking: Reported on 8/4/2020) 1 Bottle 12    ipratropium (ATROVENT) 0.06 % nasal spray 2 Sprays by Both Nostrils route four (4) times daily. (Patient not taking: Reported on 2/3/2020) 15 mL 0    diphenoxylate-atropine (LOMOTIL) 2.5-0.025 mg per tablet Take 1 tablet by mouth four (4) times daily as needed for Diarrhea. 60 tablet 0     Allergies   Allergen Reactions    Diclofenac Itching       Objective: There were no vitals taken for this visit. Physical Exam:   General appearance - alert, well appearing, and in no distress  Mental status - alert, oriented to person, place, and time  EYE-LAURI, EOMI, corneas normal, no foreign bodies  ENT-ENT exam normal, no neck nodes or sinus tenderness  Nose - normal and patent, no erythema, discharge or polyps  Mouth - mucous membranes moist, pharynx normal without lesions  Skin-Warm and dry. no hyperpigmentation, vitiligo, or suspicious lesions  Neuro -alert, oriented, normal speech, no focal findings or movement disorder noted  Neck-normal C-spine, no tenderness, full ROM without pain        Results for orders placed or performed in visit on 09/04/20   OCCULT BLOOD IMMUNOASSAY,DIAGNOSTIC   Result Value Ref Range    Occult blood fecal, by IA Negative Negative       Assessment/Plan:    ICD-10-CM ICD-9-CM    1. Hypercholesteremia  E78.00 272.0    2. Essential hypertension, benign  I10 401.1    3.  Controlled type 2 diabetes mellitus without complication, without long-term current use of insulin (HCC)  E11.9 250.00      No orders of the defined types were placed in this encounter. lose weight, increase physical activity, call if any problems,Take 81mg aspirin daily  Patient Instructions   MyChart Activation    Thank you for requesting access to [employer]. Please follow the instructions below to securely access and download your online medical record. [employer] allows you to send messages to your doctor, view your test results, renew your prescriptions, schedule appointments, and more. How Do I Sign Up? 1. In your internet browser, go to [email or web address]  2. Click on the First Time User? Click Here link in the Sign In box. You will be redirect to the New Member Sign Up page. 3. Enter your [employer] Access Code exactly as it appears below. You will not need to use this code after youve completed the sign-up process. If you do not sign up before the expiration date, you must request a new code. [employer] Access Code: Activation code not generated  Current [employer] Status: Active (This is the date your [employer] access code will )    4. Enter the last four digits of your Social Security Number (xxxx) and Date of Birth (mm/dd/yyyy) as indicated and click Submit. You will be taken to the next sign-up page. 5. Create a [employer] ID. This will be your [employer] login ID and cannot be changed, so think of one that is secure and easy to remember. 6. Create a [employer] password. You can change your password at any time. 7. Enter your Password Reset Question and Answer. This can be used at a later time if you forget your password. 8. Enter your e-mail address. You will receive e-mail notification when new information is available in [address] 9. Click Sign Up. You can now view and download portions of your medical record. 10. Click the Download Summary menu link to download a portable copy of your medical information.     Additional Information    If you have questions, please visit the Frequently Asked Questions section of the MyChart website at [email or web address] Remember, [employer] is NOT to be used for urgent needs. For medical emergencies, dial 911. Follow-up and Dispositions    · Return in about 3 months (around 3/1/2021), or if symptoms worsen or fail to improve. I have reviewed with the patient details of the assessment and plan and all questions were answered. Relevent patient education was performed. The most recent lab findings were reviewed with the patient. An After Visit Summary was printed and given to the patient.

## 2020-12-01 NOTE — PROGRESS NOTES
1. Have you been to the ER, urgent care clinic since your last visit? Hospitalized since your last visit?no    2. Have you seen or consulted any other health care providers outside of the [address] [place] [address] since your last visit? Include any pap smears or colon screening.  No    3 most recent PHQ Screens 5/4/2020   PHQ Not Done -   Little interest or pleasure in doing things Not at all   Feeling down, depressed, irritable, or hopeless Not at all   Total Score PHQ 2 0     Chief Complaint   Patient presents with    Diabetes    Cholesterol Problem    Hypertension

## 2020-12-01 NOTE — PATIENT INSTRUCTIONS
[employer] Activation Thank you for requesting access to [employer]. Please follow the instructions below to securely access and download your online medical record. [employer] allows you to send messages to your doctor, view your test results, renew your prescriptions, schedule appointments, and more. How Do I Sign Up? 1. In your internet browser, go to [email or web address] 
2. Click on the First Time User? Click Here link in the Sign In box. You will be redirect to the New Member Sign Up page. 3. Enter your [employer] Access Code exactly as it appears below. You will not need to use this code after youve completed the sign-up process. If you do not sign up before the expiration date, you must request a new code. [employer] Access Code: Activation code not generated Current [employer] Status: Active (This is the date your [employer] access code will ) 4. Enter the last four digits of your Social Security Number (xxxx) and Date of Birth (mm/dd/yyyy) as indicated and click Submit. You will be taken to the next sign-up page. 5. Create a [employer] ID. This will be your [employer] login ID and cannot be changed, so think of one that is secure and easy to remember. 6. Create a [employer] password. You can change your password at any time. 7. Enter your Password Reset Question and Answer. This can be used at a later time if you forget your password. 8. Enter your e-mail address. You will receive e-mail notification when new information is available in [address] 9. Click Sign Up. You can now view and download portions of your medical record. 10. Click the Download Summary menu link to download a portable copy of your medical information. Additional Information If you have questions, please visit the Frequently Asked Questions section of the [employer] website at [email or web address] Remember, [employer] is NOT to be used for urgent needs. For medical emergencies, dial 911.

## 2021-02-03 DIAGNOSIS — E11.9 CONTROLLED TYPE 2 DIABETES MELLITUS WITHOUT COMPLICATION, WITHOUT LONG-TERM CURRENT USE OF INSULIN (HCC): ICD-10-CM

## 2021-02-04 RX ORDER — PREGABALIN 150 MG/1
CAPSULE ORAL
Qty: 60 CAP | Refills: 0 | Status: SHIPPED | OUTPATIENT
Start: 2021-02-04 | End: 2021-04-05

## 2021-04-01 DIAGNOSIS — I10 ESSENTIAL HYPERTENSION, BENIGN: ICD-10-CM

## 2021-04-01 DIAGNOSIS — E78.00 HYPERCHOLESTEREMIA: ICD-10-CM

## 2021-04-01 DIAGNOSIS — E11.9 CONTROLLED TYPE 2 DIABETES MELLITUS WITHOUT COMPLICATION, WITHOUT LONG-TERM CURRENT USE OF INSULIN (HCC): ICD-10-CM

## 2021-04-01 RX ORDER — LOSARTAN POTASSIUM 25 MG/1
TABLET ORAL
Qty: 90 TAB | Refills: 0 | Status: SHIPPED | OUTPATIENT
Start: 2021-04-01 | End: 2021-07-14

## 2021-04-01 RX ORDER — PANTOPRAZOLE SODIUM 40 MG/1
TABLET, DELAYED RELEASE ORAL
Qty: 90 TAB | Refills: 1 | Status: SHIPPED | OUTPATIENT
Start: 2021-04-01 | End: 2021-07-26 | Stop reason: SDUPTHER

## 2021-04-05 DIAGNOSIS — E11.9 CONTROLLED TYPE 2 DIABETES MELLITUS WITHOUT COMPLICATION, WITHOUT LONG-TERM CURRENT USE OF INSULIN (HCC): ICD-10-CM

## 2021-04-05 RX ORDER — PREGABALIN 150 MG/1
CAPSULE ORAL
Qty: 60 CAP | Refills: 0 | Status: SHIPPED | OUTPATIENT
Start: 2021-04-05 | End: 2021-05-10

## 2021-04-07 ENCOUNTER — OFFICE VISIT (OUTPATIENT)
Dept: INTERNAL MEDICINE CLINIC | Age: 66
End: 2021-04-07
Payer: MEDICARE

## 2021-04-07 VITALS
RESPIRATION RATE: 14 BRPM | HEIGHT: 61 IN | TEMPERATURE: 98.5 F | WEIGHT: 171 LBS | BODY MASS INDEX: 32.28 KG/M2 | SYSTOLIC BLOOD PRESSURE: 126 MMHG | HEART RATE: 81 BPM | OXYGEN SATURATION: 98 % | DIASTOLIC BLOOD PRESSURE: 89 MMHG

## 2021-04-07 DIAGNOSIS — E11.9 CONTROLLED TYPE 2 DIABETES MELLITUS WITHOUT COMPLICATION, WITHOUT LONG-TERM CURRENT USE OF INSULIN (HCC): Primary | ICD-10-CM

## 2021-04-07 DIAGNOSIS — E78.00 HYPERCHOLESTEREMIA: ICD-10-CM

## 2021-04-07 DIAGNOSIS — I10 ESSENTIAL HYPERTENSION, BENIGN: ICD-10-CM

## 2021-04-07 LAB
CHOLEST SERPL-MCNC: 217 MG/DL
GLUCOSE POC: 165 MG/DL
HBA1C MFR BLD HPLC: 8 %
HDLC SERPL-MCNC: 50 MG/DL
LDL CHOLESTEROL POC: 144 MG/DL
NON-HDL CHOLESTEROL, 011976: 167
TCHOL/HDL RATIO (POC): 4.4
TRIGL SERPL-MCNC: 116 MG/DL

## 2021-04-07 PROCEDURE — G8417 CALC BMI ABV UP PARAM F/U: HCPCS | Performed by: INTERNAL MEDICINE

## 2021-04-07 PROCEDURE — G8427 DOCREV CUR MEDS BY ELIG CLIN: HCPCS | Performed by: INTERNAL MEDICINE

## 2021-04-07 PROCEDURE — 99214 OFFICE O/P EST MOD 30 MIN: CPT | Performed by: INTERNAL MEDICINE

## 2021-04-07 PROCEDURE — G8510 SCR DEP NEG, NO PLAN REQD: HCPCS | Performed by: INTERNAL MEDICINE

## 2021-04-07 PROCEDURE — 3046F HEMOGLOBIN A1C LEVEL >9.0%: CPT | Performed by: INTERNAL MEDICINE

## 2021-04-07 PROCEDURE — G8399 PT W/DXA RESULTS DOCUMENT: HCPCS | Performed by: INTERNAL MEDICINE

## 2021-04-07 PROCEDURE — 1090F PRES/ABSN URINE INCON ASSESS: CPT | Performed by: INTERNAL MEDICINE

## 2021-04-07 PROCEDURE — G8536 NO DOC ELDER MAL SCRN: HCPCS | Performed by: INTERNAL MEDICINE

## 2021-04-07 PROCEDURE — 1101F PT FALLS ASSESS-DOCD LE1/YR: CPT | Performed by: INTERNAL MEDICINE

## 2021-04-07 PROCEDURE — 2022F DILAT RTA XM EVC RTNOPTHY: CPT | Performed by: INTERNAL MEDICINE

## 2021-04-07 PROCEDURE — 80061 LIPID PANEL: CPT | Performed by: INTERNAL MEDICINE

## 2021-04-07 PROCEDURE — 82962 GLUCOSE BLOOD TEST: CPT | Performed by: INTERNAL MEDICINE

## 2021-04-07 PROCEDURE — G8754 DIAS BP LESS 90: HCPCS | Performed by: INTERNAL MEDICINE

## 2021-04-07 PROCEDURE — 3017F COLORECTAL CA SCREEN DOC REV: CPT | Performed by: INTERNAL MEDICINE

## 2021-04-07 PROCEDURE — G9899 SCRN MAM PERF RSLTS DOC: HCPCS | Performed by: INTERNAL MEDICINE

## 2021-04-07 PROCEDURE — 83036 HEMOGLOBIN GLYCOSYLATED A1C: CPT | Performed by: INTERNAL MEDICINE

## 2021-04-07 PROCEDURE — G8752 SYS BP LESS 140: HCPCS | Performed by: INTERNAL MEDICINE

## 2021-04-07 RX ORDER — FLUTICASONE PROPIONATE 50 MCG
2 SPRAY, SUSPENSION (ML) NASAL DAILY
Qty: 1 BOTTLE | Refills: 12 | Status: SHIPPED | OUTPATIENT
Start: 2021-04-07 | End: 2021-07-26 | Stop reason: SDUPTHER

## 2021-04-07 NOTE — PROGRESS NOTES
Norris Castaneda is a 72 y.o. female and presents with Diabetes, Cholesterol Problem, and Hypertension  . Subjective: Allergic Rhinitis  Patient presents for evaluation of allergic symptoms. Symptoms include nasal congestion, rhinorrhea, sneezing, eye itching, watery eyes. Precipitants haved included possible pollen. Hypertension Review:  The patient has hypertension . Diet and Lifestyle: generally follows a low sodium diet, exercises sporadically  Home BP Monitoring: is not measured at home. Pertinent ROS: taking medications as instructed, no medication side effects noted, no TIA's, no chest pain on exertion, no dyspnea on exertion, no swelling of ankles. Dyslipidemia Review:  Patient presents for evaluation of lipids. Compliance with treatment thus far has been excellent. A repeat fasting lipid profile was not done. The patient does not use medications that may worsen dyslipidemias . The patient exercises sporadically. Diabetes Mellitus Review:  She has diabetes mellitus. Diabetic ROS - medication compliance: compliant all of the time, diabetic diet compliance: compliant all of the time, home glucose monitoring: is performed. Known diabetic complications: none  Cardiovascular risk factors: family history, dyslipidemia, diabetes mellitus, obesity, hypertension  Current diabetic medications include oral agents/insulin   Eye exam current (within one year): no  Weight trend: stable  Prior visit with dietician: no  Current diet: \"healthy\" diet  in general  Current exercise: walking  Current monitoring regimen: home blood tests - daily  Home blood sugar records: trend: stable  Any episodes of hypoglycemia? no  Lab review: orders written for new lab studies as appropriate; see orders. GERD Review:   Patient has a history of gastroesophageal reflux with heartburn. Symptoms have been present for a few months. She denies dysphagia. She  has not lost weight.   She denies melena, hematochezia, hematemesis, and coffee ground emesis. This has been associated with fullness after meals. She denies abdominal bloating and none. Medical therapy in the past has included proton pump inhibitor    She is s/p lower back surgery          Doc Elizabeth is a 72 y.o. female and presents for annual Medicare Wellness Visit. Problem List: Reviewed with patient and discussed risk factors.     Patient Active Problem List   Diagnosis Code    Hypercholesteremia E78.00    Herniated nucleus pulposus [id number]    Neurogenic bladder, NOS N31.9    Cholelithiasis K80.20    Essential hypertension, benign I10    GERD, Gastro- Esophageal Reflux Diease (acid reflux) K21.9       Current medical providers:  Patient Care Team:  Moises Pinedo., MD as PCP - General (Internal Medicine)  Moises Pinedo., MD as PCP - [facility] Provider    PSH: Reviewed with patient  Past Surgical History:   Procedure Laterality Date    HX GYN      HX HAMMER TOE REPAIR  6/21/2016    left foot         SH: Reviewed with patient  Social History     Tobacco Use    Smoking status: Never Smoker    Smokeless tobacco: Never Used   Substance Use Topics    Alcohol use: No    Drug use: No       FH: Reviewed with patient  Family History   Problem Relation Age of Onset    Heart Disease Mother     Cancer Father        Medications/Allergies: Reviewed with patient  Current Outpatient Medications on File Prior to Visit   Medication Sig Dispense Refill    pregabalin (LYRICA) 150 mg capsule TAKE 1 CAPSULE BY MOUTH TWICE A DAY 60 Cap 0    losartan (COZAAR) 25 mg tablet TAKE 1 TABLET BY MOUTH EVERY DAY 90 Tab 0    pantoprazole (PROTONIX) 40 mg tablet TAKE 1 TABLET BY MOUTH EVERY DAY 90 Tab 1    fenofibrate micronized (LOFIBRA) 134 mg capsule TAKE 1 CAPSULE BY MOUTH EVERY DAY 90 Cap 2    celecoxib (CELEBREX) 200 mg capsule TAKE 1 CAPSULE BY MOUTH EVERY DAY 30 Cap 11    SITagliptin (Januvia) 100 mg tablet TAKE 1 TABLET BY MOUTH EVERY DAY 30 Tab 11    fluconazole (DIFLUCAN) 100 mg tablet Si tab daily for 4 week 4 Tab 1    clotrimazole-betamethasone (LOTRISONE) topical cream Apply  to affected area two (2) times a day. 45 g 3    metFORMIN (GLUCOPHAGE) 1,000 mg tablet TAKE 1 TABLET BY MOUTH TWICE A DAY 60 Tab 12    cetirizine (ZYRTEC) 10 mg tablet Take 1 Tab by mouth daily. 30 Tab 3    irbesartan (AVAPRO) 150 mg tablet TAKE ONE TABLET EVERY NIGHT AT BEDTIME. 30 Tab 12    aspirin 81 mg tablet Take  by mouth.  [DISCONTINUED] losartan (COZAAR) 25 mg tablet TAKE 1 TABLET BY MOUTH EVERY DAY 90 Tab 0    DULoxetine (CYMBALTA) 60 mg capsule TAKE 1 CAPSULE BY MOUTH EVERY DAY (Patient not taking: Reported on 2020) 30 Cap 3    [DISCONTINUED] pantoprazole (PROTONIX) 40 mg tablet Take 1 Tab by mouth daily. 30 Tab 3    SUMAtriptan (IMITREX) 100 mg tablet TAKE 1 TABLET BY MOUTH ONCE AS NEEDED FOR MIGRAINE FOR UP TO 1 DOSE (Patient not taking: Reported on 2020) 8 Tab 3    naloxone 2 mg/actuation spry Use 1 spray intranasally, then discard. Repeat with new spray every 2 min as needed for opioid overdose symptoms, alternating nostrils. (Patient not taking: Reported on 2020) 2 Syringe 0    meloxicam (MOBIC) 15 mg tablet Take 1 Tab by mouth daily. (Patient not taking: Reported on 2020) 30 Tab 12    fluticasone (FLONASE) 50 mcg/actuation nasal spray 2 Sprays by Both Nostrils route daily. (Patient not taking: Reported on 2020) 1 Bottle 12    ipratropium (ATROVENT) 0.06 % nasal spray 2 Sprays by Both Nostrils route four (4) times daily. (Patient not taking: Reported on 2/3/2020) 15 mL 0    diphenoxylate-atropine (LOMOTIL) 2.5-0.025 mg per tablet Take 1 tablet by mouth four (4) times daily as needed for Diarrhea. 60 tablet 0     No current facility-administered medications on file prior to visit.        Allergies   Allergen Reactions    Diclofenac Itching       Objective:  Visit Vitals  /89   Pulse 81   Temp 98.5 °F (36.9 °C) (Oral)   Resp 14   Ht 5' 1\" (1.549 m)   Wt 171 lb (77.6 kg)   SpO2 98%   BMI 32.31 kg/m²    Body mass index is 32.31 kg/m². Assessment of cognitive impairment: Alert and oriented x 3    Depression Screen:   3 most recent PHQ Screens 4/7/2021   PHQ Not Done -   Little interest or pleasure in doing things Not at all   Feeling down, depressed, irritable, or hopeless Not at all   Total Score PHQ 2 0     Depression Review:  Patient is seen for screen of depression,denies anhedonia, weight gain, insomnia, hypersomnia, psychomotor agitation, psychomotor retardation, fatigue, feelings of worthlessness/guilt, difficulty concentrating, hopelessness, impaired memory and recurrent thoughts of death Treatment includes no medication   She denies recurrent thoughts of death and suicidal thoughts without plan. Fall Risk Assessment:    Fall Risk Assessment, last 12 mths 4/7/2021   Able to walk? Yes   Fall in past 12 months? 0   Do you feel unsteady? 0   Are you worried about falling 0       Functional Ability:   Does the patient exhibit a steady gait? yes   How long did it take the patient to get up and walk from a sitting position? seconds   Is the patient self reliant?  (ie can do own laundry, meals, household chores)  yes     Does the patient handle his/her own medications? yes     Does the patient handle his/her own money? yes     Is the patients home safe (ie good lighting, handrails on stairs and bath, etc.)? yes     Did you notice or did patient express any hearing difficulties? no     Did you notice or did patient express any vision difficulties?   no     Were distance and reading eye charts used? no       Advance Care Planning:   Patient was offered the opportunity to discuss advance care planning:  yes     Does patient have an Advance Directive:  no   If no, did you provide information on Caring Connections?   yes       Plan:      Orders Placed This Encounter    CBC W/O DIFF    METABOLIC PANEL, COMPREHENSIVE    AMB POC LIPID PROFILE    AMB POC HEMOGLOBIN A1C    AMB POC GLUCOSE BLOOD, BY GLUCOSE MONITORING DEVICE       Health Maintenance   Topic Date Due    Eye Exam Retinal or Dilated  Never done    DTaP/Tdap/Td series (1 - Tdap) Never done    Shingrix Vaccine Age 50> (1 of 2) Never done    MICROALBUMIN Q1  10/10/2018    Pneumococcal 65+ years (1 of 1 - PPSV23) Never done    A1C test (Diabetic or Prediabetic)  08/04/2021    Lipid Screen  08/04/2021    Flu Vaccine (Season Ended) 09/01/2021    Foot Exam Q1  09/01/2021    Colorectal Cancer Screening Combo  09/04/2021    Medicare Yearly Exam  04/08/2022    Breast Cancer Screen Mammogram  10/12/2022    Hepatitis C Screening  Completed    Bone Densitometry (Dexa) Screening  Completed    COVID-19 Vaccine  Completed       *Patient verbalized understanding and agreement with the plan. A copy of the After Visit Summary with personalized health plan was given to the patient today. Review of Systems  Constitutional: negative for fevers, chills, anorexia and weight loss  Eyes:   negative for visual disturbance and irritation  ENT:   negative for tinnitus,sore throat,nasal congestion,ear pains. hoarseness  Respiratory:  negative for cough, hemoptysis, dyspnea,wheezing  CV:   negative for chest pain, palpitations, lower extremity edema  GI:   negative for nausea, vomiting, diarrhea, abdominal pain,melena  Endo:               negative for polyuria,polydipsia,polyphagia,heat intolerance  Genitourinary: negative for frequency, dysuria and hematuria  Integument:  negative for rash and pruritus  Hematologic:  negative for easy bruising and gum/nose bleeding  Musculoskel: negative for myalgias, arthralgias, back pain, muscle weakness, joint pain  Neurological:  negative for headaches, dizziness, vertigo, memory problems and gait   Behavl/Psych: negative for feelings of anxiety, depression, mood changes    Past Medical History:   Diagnosis Date    Acute cholecystitis 2/9/2011    Acute cystitis 2011    Cholecystitis 2011    Cholelithiasis 2011    Chronic Pain 2011    Essential hypertension, benign 2011    GERD (gastroesophageal reflux disease)     GERD, Gastro- Esophageal Reflux Diease (acid reflux) 2011    Herniated nucleus pulposus 2011    Hypercholesteremia 2011    Hypercholesteremia 2011    Hypertension     Neurogenic bladder, NOS 2011     Past Surgical History:   Procedure Laterality Date    HX GYN      HX HAMMER TOE REPAIR  2016    left foot      Social History     Socioeconomic History    Marital status:      Spouse name: Not on file    Number of children: Not on file    Years of education: Not on file    Highest education level: Not on file   Tobacco Use    Smoking status: Never Smoker    Smokeless tobacco: Never Used   Substance and Sexual Activity    Alcohol use: No    Drug use: No    Sexual activity: Yes     Partners: Male     Family History   Problem Relation Age of Onset    Heart Disease Mother     Cancer Father      Current Outpatient Medications   Medication Sig Dispense Refill    pregabalin (LYRICA) 150 mg capsule TAKE 1 CAPSULE BY MOUTH TWICE A DAY 60 Cap 0    losartan (COZAAR) 25 mg tablet TAKE 1 TABLET BY MOUTH EVERY DAY 90 Tab 0    pantoprazole (PROTONIX) 40 mg tablet TAKE 1 TABLET BY MOUTH EVERY DAY 90 Tab 1    fenofibrate micronized (LOFIBRA) 134 mg capsule TAKE 1 CAPSULE BY MOUTH EVERY DAY 90 Cap 2    celecoxib (CELEBREX) 200 mg capsule TAKE 1 CAPSULE BY MOUTH EVERY DAY 30 Cap 11    SITagliptin (Januvia) 100 mg tablet TAKE 1 TABLET BY MOUTH EVERY DAY 30 Tab 11    fluconazole (DIFLUCAN) 100 mg tablet Si tab daily for 4 week 4 Tab 1    clotrimazole-betamethasone (LOTRISONE) topical cream Apply  to affected area two (2) times a day.  45 g 3    metFORMIN (GLUCOPHAGE) 1,000 mg tablet TAKE 1 TABLET BY MOUTH TWICE A DAY 60 Tab 12    cetirizine (ZYRTEC) 10 mg tablet Take 1 Tab by mouth daily. 30 Tab 3    irbesartan (AVAPRO) 150 mg tablet TAKE ONE TABLET EVERY NIGHT AT BEDTIME. 30 Tab 12    aspirin 81 mg tablet Take  by mouth.  DULoxetine (CYMBALTA) 60 mg capsule TAKE 1 CAPSULE BY MOUTH EVERY DAY (Patient not taking: Reported on 12/1/2020) 30 Cap 3    SUMAtriptan (IMITREX) 100 mg tablet TAKE 1 TABLET BY MOUTH ONCE AS NEEDED FOR MIGRAINE FOR UP TO 1 DOSE (Patient not taking: Reported on 12/1/2020) 8 Tab 3    naloxone 2 mg/actuation spry Use 1 spray intranasally, then discard. Repeat with new spray every 2 min as needed for opioid overdose symptoms, alternating nostrils. (Patient not taking: Reported on 8/4/2020) 2 Syringe 0    meloxicam (MOBIC) 15 mg tablet Take 1 Tab by mouth daily. (Patient not taking: Reported on 8/4/2020) 30 Tab 12    fluticasone (FLONASE) 50 mcg/actuation nasal spray 2 Sprays by Both Nostrils route daily. (Patient not taking: Reported on 8/4/2020) 1 Bottle 12    ipratropium (ATROVENT) 0.06 % nasal spray 2 Sprays by Both Nostrils route four (4) times daily. (Patient not taking: Reported on 2/3/2020) 15 mL 0    diphenoxylate-atropine (LOMOTIL) 2.5-0.025 mg per tablet Take 1 tablet by mouth four (4) times daily as needed for Diarrhea.  60 tablet 0     Allergies   Allergen Reactions    Diclofenac Itching       Objective:  Visit Vitals  /89   Pulse 81   Temp 98.5 °F (36.9 °C) (Oral)   Resp 14   Ht 5' 1\" (1.549 m)   Wt 171 lb (77.6 kg)   SpO2 98%   BMI 32.31 kg/m²     Physical Exam:   General appearance - alert, well appearing, and in no distress  Mental status - alert, oriented to person, place, and time  EYE-LAURI, EOMI, corneas normal, no foreign bodies  ENT-ENT exam normal, no neck nodes or sinus tenderness  Nose - normal and patent, no erythema, discharge or polyps  Mouth - mucous membranes moist, pharynx normal without lesions  Neck - supple, no significant adenopathy   Chest - clear to auscultation, no wheezes, rales or rhonchi, symmetric air entry   Heart - normal rate, regular rhythm, normal S1, S2, no murmurs, rubs, clicks or gallops   Abdomen - soft, nontender, nondistended, no masses or organomegaly  Lymph- no adenopathy palpable  Ext-peripheral pulses normal, no pedal edema, no clubbing or cyanosis  Skin-Warm and dry. no hyperpigmentation, vitiligo, or suspicious lesions  Neuro -alert, oriented, normal speech, no focal findings or movement disorder noted  Neck-normal C-spine, no tenderness, full ROM without pain  Feet-no nail deformities or callus formation with good pulses noted      Results for orders placed or performed in visit on 09/04/20   OCCULT BLOOD IMMUNOASSAY,DIAGNOSTIC   Result Value Ref Range    Occult blood fecal, by IA Negative Negative       Assessment/Plan:    ICD-10-CM ICD-9-CM    1. Controlled type 2 diabetes mellitus without complication, without long-term current use of insulin (HCC)  E11.9 250.00 AMB POC LIPID PROFILE      AMB POC HEMOGLOBIN A1C      AMB POC GLUCOSE BLOOD, BY GLUCOSE MONITORING DEVICE   2. Hypercholesteremia  E78.00 272.0 AMB POC LIPID PROFILE      AMB POC HEMOGLOBIN A1C      AMB POC GLUCOSE BLOOD, BY GLUCOSE MONITORING DEVICE   3.  Essential hypertension, benign  I10 401.1 AMB POC LIPID PROFILE      AMB POC HEMOGLOBIN A1C      AMB POC GLUCOSE BLOOD, BY GLUCOSE MONITORING DEVICE      CBC W/O DIFF      METABOLIC PANEL, COMPREHENSIVE      CBC W/O DIFF      METABOLIC PANEL, COMPREHENSIVE     Orders Placed This Encounter    CBC W/O DIFF     Standing Status:   Future     Number of Occurrences:   1     Standing Expiration Date:   4/3/6150    METABOLIC PANEL, COMPREHENSIVE     Standing Status:   Future     Number of Occurrences:   1     Standing Expiration Date:   4/7/2022    AMB POC LIPID PROFILE    AMB POC HEMOGLOBIN A1C    AMB POC GLUCOSE BLOOD, BY GLUCOSE MONITORING DEVICE     lose weight, increase physical activity, follow low fat diet, follow low salt diet, continue present plan, routine labs ordered, call if any problems  Patient Instructions   MyChart Activation    Thank you for requesting access to [facility]. Please follow the instructions below to securely access and download your online medical record. [facility] allows you to send messages to your doctor, view your test results, renew your prescriptions, schedule appointments, and more. How Do I Sign Up? 1. In your internet browser, go to [email or web address]  2. Click on the First Time User? Click Here link in the Sign In box. You will be redirect to the New Member Sign Up page. 3. Enter your Anystream Access Code exactly as it appears below. You will not need to use this code after youve completed the sign-up process. If you do not sign up before the expiration date, you must request a new code. [facility]stream Access Code: Activation code not generated  Current Anystream Status: Active (This is the date your Anystream access code will )    4. Enter the last four digits of your Social Security Number (xxxx) and Date of Birth (mm/dd/yyyy) as indicated and click Submit. You will be taken to the next sign-up page. 5. Create a Anystream ID. This will be your Anystream login ID and cannot be changed, so think of one that is secure and easy to remember. 6. Create a Anystream password. You can change your password at any time. 7. Enter your Password Reset Question and Answer. This can be used at a later time if you forget your password. 8. Enter your e-mail address. You will receive e-mail notification when new information is available in [address] 9. Click Sign Up. You can now view and download portions of your medical record. 10. Click the Download Summary menu link to download a portable copy of your medical information. Additional Information    If you have questions, please visit the Frequently Asked Questions section of the [employer] website at [email or web address] Remember, [facility] is NOT to be used for urgent needs.  For medical emergencies, dial 911. Follow-up and Dispositions    · Return in about 3 months (around 7/7/2021), or if symptoms worsen or fail to improve. I have reviewed with the patient details of the assessment and plan and all questions were answered. Relevent patient education was performed    An After Visit Summary was printed and given to the patient.

## 2021-04-07 NOTE — PROGRESS NOTES
1. Have you been to the ER, urgent care clinic since your last visit? Hospitalized since your last visit?no    2. Have you seen or consulted any other health care providers outside of the [address] [place] [address] since your last visit? Include any pap smears or colon screening. No    Chief Complaint   Patient presents with    Diabetes    Cholesterol Problem    Hypertension     Per Dr. Kalli Khan.,  verbal order given for needed amb poc labs.   3 most recent PHQ Screens 4/7/2021   PHQ Not Done -   Little interest or pleasure in doing things Not at all   Feeling down, depressed, irritable, or hopeless Not at all   Total Score PHQ 2 0

## 2021-04-08 LAB
ALBUMIN SERPL-MCNC: 4.8 G/DL (ref 3.8–4.8)
ALBUMIN/GLOB SERPL: 2 {RATIO} (ref 1.2–2.2)
ALP SERPL-CCNC: 94 IU/L (ref 39–117)
ALT SERPL-CCNC: 43 IU/L (ref 0–32)
AST SERPL-CCNC: 29 IU/L (ref 0–40)
BILIRUB SERPL-MCNC: 0.4 MG/DL (ref 0–1.2)
BUN SERPL-MCNC: 13 MG/DL (ref 8–27)
BUN/CREAT SERPL: 15 (ref 12–28)
CALCIUM SERPL-MCNC: 10.2 MG/DL (ref 8.7–10.3)
CHLORIDE SERPL-SCNC: 104 MMOL/L (ref 96–106)
CO2 SERPL-SCNC: 23 MMOL/L (ref 20–29)
CREAT SERPL-MCNC: 0.87 MG/DL (ref 0.57–1)
ERYTHROCYTE [DISTWIDTH] IN BLOOD BY AUTOMATED COUNT: 14.1 % (ref 11.7–15.4)
GLOBULIN SER CALC-MCNC: 2.4 G/DL (ref 1.5–4.5)
GLUCOSE SERPL-MCNC: 162 MG/DL (ref 65–99)
HCT VFR BLD AUTO: 39.5 % (ref 34–46.6)
HGB BLD-MCNC: 12.9 G/DL (ref 11.1–15.9)
MCH RBC QN AUTO: 27.4 PG (ref 26.6–33)
MCHC RBC AUTO-ENTMCNC: 32.7 G/DL (ref 31.5–35.7)
MCV RBC AUTO: 84 FL (ref 79–97)
PLATELET # BLD AUTO: 506 X10E3/UL (ref 150–450)
POTASSIUM SERPL-SCNC: 5.2 MMOL/L (ref 3.5–5.2)
PROT SERPL-MCNC: 7.2 G/DL (ref 6–8.5)
RBC # BLD AUTO: 4.7 X10E6/UL (ref 3.77–5.28)
SODIUM SERPL-SCNC: 141 MMOL/L (ref 134–144)
WBC # BLD AUTO: 7.8 X10E3/UL (ref 3.4–10.8)

## 2021-05-08 DIAGNOSIS — E11.9 CONTROLLED TYPE 2 DIABETES MELLITUS WITHOUT COMPLICATION, WITHOUT LONG-TERM CURRENT USE OF INSULIN (HCC): ICD-10-CM

## 2021-05-10 RX ORDER — PREGABALIN 150 MG/1
CAPSULE ORAL
Qty: 60 CAP | Refills: 0 | Status: SHIPPED | OUTPATIENT
Start: 2021-05-10 | End: 2021-06-08

## 2021-07-13 DIAGNOSIS — E78.00 HYPERCHOLESTEREMIA: ICD-10-CM

## 2021-07-13 DIAGNOSIS — E11.9 CONTROLLED TYPE 2 DIABETES MELLITUS WITHOUT COMPLICATION, WITHOUT LONG-TERM CURRENT USE OF INSULIN (HCC): ICD-10-CM

## 2021-07-13 DIAGNOSIS — I10 ESSENTIAL HYPERTENSION, BENIGN: ICD-10-CM

## 2021-07-14 RX ORDER — LOSARTAN POTASSIUM 25 MG/1
TABLET ORAL
Qty: 90 TABLET | Refills: 0 | Status: SHIPPED | OUTPATIENT
Start: 2021-07-14 | End: 2021-07-26 | Stop reason: SDUPTHER

## 2021-07-26 ENCOUNTER — OFFICE VISIT (OUTPATIENT)
Dept: INTERNAL MEDICINE CLINIC | Age: 66
End: 2021-07-26
Payer: MEDICARE

## 2021-07-26 VITALS
RESPIRATION RATE: 16 BRPM | SYSTOLIC BLOOD PRESSURE: 120 MMHG | WEIGHT: 167 LBS | HEIGHT: 61 IN | BODY MASS INDEX: 31.53 KG/M2 | TEMPERATURE: 98.4 F | HEART RATE: 87 BPM | DIASTOLIC BLOOD PRESSURE: 84 MMHG | OXYGEN SATURATION: 96 %

## 2021-07-26 DIAGNOSIS — E11.9 CONTROLLED TYPE 2 DIABETES MELLITUS WITHOUT COMPLICATION, WITHOUT LONG-TERM CURRENT USE OF INSULIN (HCC): ICD-10-CM

## 2021-07-26 DIAGNOSIS — E78.00 HYPERCHOLESTEREMIA: Primary | ICD-10-CM

## 2021-07-26 DIAGNOSIS — G51.9 FACIAL NEUROPATHY: ICD-10-CM

## 2021-07-26 DIAGNOSIS — I10 ESSENTIAL HYPERTENSION, BENIGN: ICD-10-CM

## 2021-07-26 LAB
GLUCOSE POC: 188 MG/DL
HBA1C MFR BLD HPLC: 7.5 %

## 2021-07-26 PROCEDURE — G8417 CALC BMI ABV UP PARAM F/U: HCPCS | Performed by: INTERNAL MEDICINE

## 2021-07-26 PROCEDURE — G8752 SYS BP LESS 140: HCPCS | Performed by: INTERNAL MEDICINE

## 2021-07-26 PROCEDURE — 1090F PRES/ABSN URINE INCON ASSESS: CPT | Performed by: INTERNAL MEDICINE

## 2021-07-26 PROCEDURE — 83036 HEMOGLOBIN GLYCOSYLATED A1C: CPT | Performed by: INTERNAL MEDICINE

## 2021-07-26 PROCEDURE — G8427 DOCREV CUR MEDS BY ELIG CLIN: HCPCS | Performed by: INTERNAL MEDICINE

## 2021-07-26 PROCEDURE — G8536 NO DOC ELDER MAL SCRN: HCPCS | Performed by: INTERNAL MEDICINE

## 2021-07-26 PROCEDURE — 3017F COLORECTAL CA SCREEN DOC REV: CPT | Performed by: INTERNAL MEDICINE

## 2021-07-26 PROCEDURE — G8432 DEP SCR NOT DOC, RNG: HCPCS | Performed by: INTERNAL MEDICINE

## 2021-07-26 PROCEDURE — G8399 PT W/DXA RESULTS DOCUMENT: HCPCS | Performed by: INTERNAL MEDICINE

## 2021-07-26 PROCEDURE — 1101F PT FALLS ASSESS-DOCD LE1/YR: CPT | Performed by: INTERNAL MEDICINE

## 2021-07-26 PROCEDURE — 2022F DILAT RTA XM EVC RTNOPTHY: CPT | Performed by: INTERNAL MEDICINE

## 2021-07-26 PROCEDURE — G9899 SCRN MAM PERF RSLTS DOC: HCPCS | Performed by: INTERNAL MEDICINE

## 2021-07-26 PROCEDURE — 82962 GLUCOSE BLOOD TEST: CPT | Performed by: INTERNAL MEDICINE

## 2021-07-26 PROCEDURE — 99214 OFFICE O/P EST MOD 30 MIN: CPT | Performed by: INTERNAL MEDICINE

## 2021-07-26 PROCEDURE — G8754 DIAS BP LESS 90: HCPCS | Performed by: INTERNAL MEDICINE

## 2021-07-26 RX ORDER — FLUTICASONE PROPIONATE 50 MCG
2 SPRAY, SUSPENSION (ML) NASAL DAILY
Qty: 1 BOTTLE | Refills: 12 | Status: SHIPPED | OUTPATIENT
Start: 2021-07-26 | End: 2022-08-30

## 2021-07-26 RX ORDER — LOSARTAN POTASSIUM 25 MG/1
TABLET ORAL
Qty: 90 TABLET | Refills: 0 | Status: SHIPPED | OUTPATIENT
Start: 2021-07-26 | End: 2021-10-17

## 2021-07-26 RX ORDER — BACLOFEN 10 MG/1
10 TABLET ORAL 2 TIMES DAILY
Qty: 60 TABLET | Refills: 12 | Status: SHIPPED | OUTPATIENT
Start: 2021-07-26 | End: 2022-03-01 | Stop reason: SDUPTHER

## 2021-07-26 RX ORDER — PREGABALIN 150 MG/1
CAPSULE ORAL
Qty: 60 CAPSULE | Refills: 3 | Status: SHIPPED | OUTPATIENT
Start: 2021-07-26 | End: 2022-03-01 | Stop reason: SDUPTHER

## 2021-07-26 RX ORDER — PANTOPRAZOLE SODIUM 40 MG/1
TABLET, DELAYED RELEASE ORAL
Qty: 90 TABLET | Refills: 1 | Status: SHIPPED | OUTPATIENT
Start: 2021-07-26 | End: 2022-03-01 | Stop reason: SDUPTHER

## 2021-07-26 RX ORDER — FLUCONAZOLE 150 MG/1
150 TABLET ORAL DAILY
Qty: 1 TABLET | Refills: 2 | Status: SHIPPED | OUTPATIENT
Start: 2021-07-26 | End: 2021-07-27

## 2021-07-26 RX ORDER — FENOFIBRATE 134 MG/1
CAPSULE ORAL
Qty: 90 CAPSULE | Refills: 2 | Status: SHIPPED | OUTPATIENT
Start: 2021-07-26 | End: 2022-04-20

## 2021-07-26 RX ORDER — CELECOXIB 200 MG/1
CAPSULE ORAL
Qty: 30 CAPSULE | Refills: 11 | Status: SHIPPED | OUTPATIENT
Start: 2021-07-26 | End: 2022-09-10

## 2021-07-26 NOTE — PROGRESS NOTES
1. Have you been to the ER, urgent care clinic since your last visit? Hospitalized since your last visit?no    2. Have you seen or consulted any other health care providers outside of the [address] [place] [address] since your last visit? Include any pap smears or colon screening. No    Chief Complaint   Patient presents with    Diabetes    Cholesterol Problem    Hypertension    GERD     Per Dr. Kirsten Buck.,  verbal order given for needed amb poc labs.     3 most recent PHQ Screens 4/7/2021   PHQ Not Done -   Little interest or pleasure in doing things Not at all   Feeling down, depressed, irritable, or hopeless Not at all   Total Score PHQ 2 0

## 2021-07-26 NOTE — PROGRESS NOTES
Neeta Glover is a 72 y.o. female and presents with Diabetes, Cholesterol Problem, Hypertension, and GERD  . Subjective:  Leg cramping  Review:  Patient has arthralgias and myalgias, primarily affecting the legs. Symptoms onset: problem is recent. she has had progessive cramping at night  Rheumatological ROS: using NSAID medication regularly, daily. Response to treatment plan: waxing and waning. Lisa Frye Hypertension Review:  The patient has hypertension . Diet and Lifestyle: generally follows a low sodium diet, exercises sporadically  Home BP Monitoring: is not measured at home. Pertinent ROS: taking medications as instructed, no medication side effects noted, no TIA's, no chest pain on exertion, no dyspnea on exertion, no swelling of ankles. Dyslipidemia Review:  Patient presents for evaluation of lipids. Compliance with treatment thus far has been excellent. A repeat fasting lipid profile was not done. The patient does not use medications that may worsen dyslipidemias . The patient exercises sporadically. Diabetes Mellitus Review:  She has diabetes mellitus. Diabetic ROS - medication compliance: compliant all of the time, diabetic diet compliance: compliant all of the time, home glucose monitoring: is performed. Known diabetic complications: none  Cardiovascular risk factors: family history, dyslipidemia, diabetes mellitus, obesity, hypertension  Current diabetic medications include oral agents/insulin   Eye exam current (within one year): no  Weight trend: stable  Prior visit with dietician: no  Current diet: \"healthy\" diet  in general  Current exercise: walking  Current monitoring regimen: home blood tests - daily  Home blood sugar records: trend: stable  Any episodes of hypoglycemia? no  Lab review: orders written for new lab studies as appropriate; see orders. GERD Review:   Patient has a history of gastroesophageal reflux with heartburn. Symptoms have been present for a few months. She denies dysphagia. She  has not lost weight. She denies melena, hematochezia, hematemesis, and coffee ground emesis. This has been associated with fullness after meals. She denies abdominal bloating and none. Medical therapy in the past has included proton pump inhibitor    She is s/p lower back surgery          Sonya Madrigal is a 72 y.o. female and presents for annual Medicare Wellness Visit. Problem List: Reviewed with patient and discussed risk factors. Patient Active Problem List   Diagnosis Code    Hypercholesteremia E78.00    Herniated nucleus pulposus [id number]    Neurogenic bladder, NOS N31.9    Cholelithiasis K80.20    Essential hypertension, benign I10    GERD, Gastro- Esophageal Reflux Diease (acid reflux) K21.9       Current medical providers:  Patient Care Team:  Justin Zazueta MD as PCP - General (Internal Medicine)  Justin [facility]azu[facility] as PCP [facility]    PSH: Reviewed with patient  Past Surgical History:   Procedure Laterality Date    HX GYN      HX HAMMER TOE REPAIR  6/21/2016    left foot         SH: Reviewed with patient  Social History     Tobacco Use    Smoking status: Never Smoker    Smokeless tobacco: Never Used   Substance Use Topics    Alcohol use: No    Drug use: No       FH: Reviewed with patient  Family History   Problem Relation Age of Onset    Heart Disease Mother     Cancer Father        Medications/Allergies: Reviewed with patient  Current Outpatient Medications on File Prior to Visit   Medication Sig Dispense Refill    losartan (COZAAR) 25 mg tablet TAKE 1 TABLET BY MOUTH EVERY DAY 90 Tablet 0    pregabalin (LYRICA) 150 mg capsule TAKE 1 CAPSULE BY MOUTH TWICE A DAY 60 Capsule 3    fluticasone propionate (FLONASE) 50 mcg/actuation nasal spray 2 Sprays by Both Nostrils route daily.  1 Bottle 12    pantoprazole (PROTONIX) 40 mg tablet TAKE 1 TABLET BY MOUTH EVERY DAY 90 Tab 1    fenofibrate micronized (LOFIBRA) 134 mg capsule TAKE 1 CAPSULE BY MOUTH EVERY DAY 90 Cap 2    celecoxib (CELEBREX) 200 mg capsule TAKE 1 CAPSULE BY MOUTH EVERY DAY 30 Cap 11    SITagliptin (Januvia) 100 mg tablet TAKE 1 TABLET BY MOUTH EVERY DAY 30 Tab 11    clotrimazole-betamethasone (LOTRISONE) topical cream Apply  to affected area two (2) times a day. 45 g 3    metFORMIN (GLUCOPHAGE) 1,000 mg tablet TAKE 1 TABLET BY MOUTH TWICE A DAY 60 Tab 12    cetirizine (ZYRTEC) 10 mg tablet Take 1 Tab by mouth daily. 30 Tab 3    irbesartan (AVAPRO) 150 mg tablet TAKE ONE TABLET EVERY NIGHT AT BEDTIME. 30 Tab 12    aspirin 81 mg tablet Take  by mouth.  [DISCONTINUED] losartan (COZAAR) 25 mg tablet TAKE 1 TABLET BY MOUTH EVERY DAY 90 Tab 0    DULoxetine (CYMBALTA) 60 mg capsule TAKE 1 CAPSULE BY MOUTH EVERY DAY (Patient not taking: Reported on 2020) 30 Cap 3    fluconazole (DIFLUCAN) 100 mg tablet Si tab daily for 4 week (Patient not taking: Reported on 2021) 4 Tab 1    [DISCONTINUED] pantoprazole (PROTONIX) 40 mg tablet Take 1 Tab by mouth daily. 30 Tab 3    SUMAtriptan (IMITREX) 100 mg tablet TAKE 1 TABLET BY MOUTH ONCE AS NEEDED FOR MIGRAINE FOR UP TO 1 DOSE (Patient not taking: Reported on 2020) 8 Tab 3    naloxone 2 mg/actuation spry Use 1 spray intranasally, then discard. Repeat with new spray every 2 min as needed for opioid overdose symptoms, alternating nostrils. (Patient not taking: Reported on 2020) 2 Syringe 0    meloxicam (MOBIC) 15 mg tablet Take 1 Tab by mouth daily. (Patient not taking: Reported on 2020) 30 Tab 12    fluticasone (FLONASE) 50 mcg/actuation nasal spray 2 Sprays by Both Nostrils route daily. (Patient not taking: Reported on 2020) 1 Bottle 12    ipratropium (ATROVENT) 0.06 % nasal spray 2 Sprays by Both Nostrils route four (4) times daily.  (Patient not taking: Reported on 2/3/2020) 15 mL 0    diphenoxylate-atropine (LOMOTIL) 2.5-0.025 mg per tablet Take 1 tablet by mouth four (4) times daily as needed for Diarrhea. (Patient not taking: Reported on 7/26/2021) 60 tablet 0     No current facility-administered medications on file prior to visit. Allergies   Allergen Reactions    Diclofenac Itching       Objective:  Visit Vitals  /84   Pulse 87   Temp 98.4 °F (36.9 °C) (Oral)   Resp 16   Ht 5' 1\" (1.549 m)   Wt 167 lb (75.8 kg)   SpO2 96%   BMI 31.55 kg/m²    Body mass index is 31.55 kg/m². Assessment of cognitive impairment: Alert and oriented x 3    Depression Screen:   3 most recent PHQ Screens 4/7/2021   PHQ Not Done -   Little interest or pleasure in doing things Not at all   Feeling down, depressed, irritable, or hopeless Not at all   Total Score PHQ 2 0     Depression Review:  Patient is seen for screen of depression,denies anhedonia, weight gain, insomnia, hypersomnia, psychomotor agitation, psychomotor retardation, fatigue, feelings of worthlessness/guilt, difficulty concentrating, hopelessness, impaired memory and recurrent thoughts of death Treatment includes no medication   She denies recurrent thoughts of death and suicidal thoughts without plan. Fall Risk Assessment:    Fall Risk Assessment, last 12 mths 7/26/2021   Able to walk? Yes   Fall in past 12 months? 0   Do you feel unsteady? 0   Are you worried about falling 0       Functional Ability:   Does the patient exhibit a steady gait? yes   How long did it take the patient to get up and walk from a sitting position? seconds   Is the patient self reliant?  (ie can do own laundry, meals, household chores)  yes     Does the patient handle his/her own medications? yes     Does the patient handle his/her own money? yes     Is the patients home safe (ie good lighting, handrails on stairs and bath, etc.)? yes     Did you notice or did patient express any hearing difficulties? no     Did you notice or did patient express any vision difficulties?   no     Were distance and reading eye charts used?   no       Advance Care Planning: Patient was offered the opportunity to discuss advance care planning:  yes     Does patient have an Advance Directive:  no   If no, did you provide information on Caring Connections? yes       Plan:      Orders Placed This Encounter    AMB POC HEMOGLOBIN A1C    AMB POC GLUCOSE BLOOD, BY GLUCOSE MONITORING DEVICE       Health Maintenance   Topic Date Due    Eye Exam Retinal or Dilated  Never done    DTaP/Tdap/Td series (1 - Tdap) Never done    Shingrix Vaccine Age 50> (1 of 2) Never done    MICROALBUMIN Q1  10/10/2018    Pneumococcal 65+ years (1 of 1 - PPSV23) Never done    Flu Vaccine (1) 09/01/2021    Foot Exam Q1  09/01/2021    Colorectal Cancer Screening Combo  09/04/2021    Lipid Screen  04/07/2022    Medicare Yearly Exam  04/08/2022    A1C test (Diabetic or Prediabetic)  07/26/2022    Breast Cancer Screen Mammogram  10/12/2022    Hepatitis C Screening  Completed    Bone Densitometry (Dexa) Screening  Completed    COVID-19 Vaccine  Completed       *Patient verbalized understanding and agreement with the plan. A copy of the After Visit Summary with personalized health plan was given to the patient today. Review of Systems  Constitutional: negative for fevers, chills, anorexia and weight loss  Eyes:   negative for visual disturbance and irritation  ENT:   negative for tinnitus,sore throat,nasal congestion,ear pains. hoarseness  Respiratory:  negative for cough, hemoptysis, dyspnea,wheezing  CV:   negative for chest pain, palpitations, lower extremity edema  GI:   negative for nausea, vomiting, diarrhea, abdominal pain,melena  Endo:               negative for polyuria,polydipsia,polyphagia,heat intolerance  Genitourinary: negative for frequency, dysuria and hematuria  Integument:  negative for rash and pruritus  Hematologic:  negative for easy bruising and gum/nose bleeding  Musculoskel: negative for myalgias, arthralgias, back pain, muscle weakness, joint pain  Neurological:  negative for headaches, dizziness, vertigo, memory problems and gait   Behavl/Psych: negative for feelings of anxiety, depression, mood changes    Past Medical History:   Diagnosis Date    Acute cholecystitis 2/9/2011    Acute cystitis 2/9/2011    Cholecystitis 2/9/2011    Cholelithiasis 2/9/2011    Chronic Pain 2/9/2011    Essential hypertension, benign 2/9/2011    GERD (gastroesophageal reflux disease)     GERD, Gastro- Esophageal Reflux Diease (acid reflux) 2/9/2011    Herniated nucleus pulposus 2/9/2011    Hypercholesteremia 2/9/2011    Hypercholesteremia 2/9/2011    Hypertension     Neurogenic bladder, NOS 2/9/2011     Past Surgical History:   Procedure Laterality Date    HX GYN      HX HAMMER TOE REPAIR  6/21/2016    left foot      Social History     Socioeconomic History    Marital status:      Spouse name: Not on file    Number of children: Not on file    Years of education: Not on file    Highest education level: Not on file   Tobacco Use    Smoking status: Never Smoker    Smokeless tobacco: Never Used   Substance and Sexual Activity    Alcohol use: No    Drug use: No    Sexual activity: Yes     Partners: Male     Social Determinants of Health     Financial Resource Strain:     Difficulty of Paying Living Expenses:    Food Insecurity:     Worried About Running Out of Food in the Last Year:     Ran Out of Food in the Last Year:    Transportation Needs:     Lack of Transportation (Medical):      Lack of Transportation (Non-Medical):    Physical Activity:     Days of Exercise per Week:     Minutes of Exercise per Session:    Stress:     Feeling of Stress :    Social Connections:     Frequency of Communication with Friends and Family:     Frequency of Social Gatherings with Friends and Family:     Attends Sikh Services:     Active Member of Clubs or Organizations:     Attends Club or Organization Meetings:     Marital Status:      Family History   Problem Relation Age of Onset    Heart Disease Mother     Cancer Father      Current Outpatient Medications   Medication Sig Dispense Refill    losartan (COZAAR) 25 mg tablet TAKE 1 TABLET BY MOUTH EVERY DAY 90 Tablet 0    pregabalin (LYRICA) 150 mg capsule TAKE 1 CAPSULE BY MOUTH TWICE A DAY 60 Capsule 3    fluticasone propionate (FLONASE) 50 mcg/actuation nasal spray 2 Sprays by Both Nostrils route daily. 1 Bottle 12    pantoprazole (PROTONIX) 40 mg tablet TAKE 1 TABLET BY MOUTH EVERY DAY 90 Tab 1    fenofibrate micronized (LOFIBRA) 134 mg capsule TAKE 1 CAPSULE BY MOUTH EVERY DAY 90 Cap 2    celecoxib (CELEBREX) 200 mg capsule TAKE 1 CAPSULE BY MOUTH EVERY DAY 30 Cap 11    SITagliptin (Januvia) 100 mg tablet TAKE 1 TABLET BY MOUTH EVERY DAY 30 Tab 11    clotrimazole-betamethasone (LOTRISONE) topical cream Apply  to affected area two (2) times a day. 45 g 3    metFORMIN (GLUCOPHAGE) 1,000 mg tablet TAKE 1 TABLET BY MOUTH TWICE A DAY 60 Tab 12    cetirizine (ZYRTEC) 10 mg tablet Take 1 Tab by mouth daily. 30 Tab 3    irbesartan (AVAPRO) 150 mg tablet TAKE ONE TABLET EVERY NIGHT AT BEDTIME. 30 Tab 12    aspirin 81 mg tablet Take  by mouth.  DULoxetine (CYMBALTA) 60 mg capsule TAKE 1 CAPSULE BY MOUTH EVERY DAY (Patient not taking: Reported on 2020) 30 Cap 3    fluconazole (DIFLUCAN) 100 mg tablet Si tab daily for 4 week (Patient not taking: Reported on 2021) 4 Tab 1    SUMAtriptan (IMITREX) 100 mg tablet TAKE 1 TABLET BY MOUTH ONCE AS NEEDED FOR MIGRAINE FOR UP TO 1 DOSE (Patient not taking: Reported on 2020) 8 Tab 3    naloxone 2 mg/actuation spry Use 1 spray intranasally, then discard. Repeat with new spray every 2 min as needed for opioid overdose symptoms, alternating nostrils. (Patient not taking: Reported on 2020) 2 Syringe 0    meloxicam (MOBIC) 15 mg tablet Take 1 Tab by mouth daily.  (Patient not taking: Reported on 2020) 30 Tab 12    fluticasone (FLONASE) 50 mcg/actuation nasal spray 2 Sprays by Both Nostrils route daily. (Patient not taking: Reported on 8/4/2020) 1 Bottle 12    ipratropium (ATROVENT) 0.06 % nasal spray 2 Sprays by Both Nostrils route four (4) times daily. (Patient not taking: Reported on 2/3/2020) 15 mL 0    diphenoxylate-atropine (LOMOTIL) 2.5-0.025 mg per tablet Take 1 tablet by mouth four (4) times daily as needed for Diarrhea. (Patient not taking: Reported on 7/26/2021) 60 tablet 0     Allergies   Allergen Reactions    Diclofenac Itching       Objective:  Visit Vitals  /84   Pulse 87   Temp 98.4 °F (36.9 °C) (Oral)   Resp 16   Ht 5' 1\" (1.549 m)   Wt 167 lb (75.8 kg)   SpO2 96%   BMI 31.55 kg/m²     Physical Exam:   General appearance - alert, well appearing, and in no distress  Mental status - alert, oriented to person, place, and time  EYE-LAURI, EOMI, corneas normal, no foreign bodies  ENT-ENT exam normal, no neck nodes or sinus tenderness  Nose - normal and patent, no erythema, discharge or polyps  Mouth - mucous membranes moist, pharynx normal without lesions  Neck - supple, no significant adenopathy   Chest - clear to auscultation, no wheezes, rales or rhonchi, symmetric air entry   Heart - normal rate, regular rhythm, normal S1, S2, no murmurs, rubs, clicks or gallops   Abdomen - soft, nontender, nondistended, no masses or organomegaly  Lymph- no adenopathy palpable  Ext-peripheral pulses normal, no pedal edema, no clubbing or cyanosis  Skin-Warm and dry.  no hyperpigmentation, vitiligo, or suspicious lesions  Neuro -alert, oriented, normal speech, no focal findings or movement disorder noted  Neck-normal C-spine, no tenderness, full ROM without pain  Feet-no nail deformities or callus formation with good pulses noted      Results for orders placed or performed in visit on 07/26/21   AMB POC HEMOGLOBIN A1C   Result Value Ref Range    Hemoglobin A1c (POC) 7.5 %   AMB POC GLUCOSE BLOOD, BY GLUCOSE MONITORING DEVICE   Result Value Ref Range    Glucose POC 188 MG/DL       Assessment/Plan:    ICD-10-CM ICD-9-CM    1. Hypercholesteremia  E78.00 272.0 AMB POC HEMOGLOBIN A1C      AMB POC GLUCOSE BLOOD, BY GLUCOSE MONITORING DEVICE      losartan (COZAAR) 25 mg tablet   2. Essential hypertension, benign  I10 401.1 AMB POC HEMOGLOBIN A1C      AMB POC GLUCOSE BLOOD, BY GLUCOSE MONITORING DEVICE      losartan (COZAAR) 25 mg tablet   3. Controlled type 2 diabetes mellitus without complication, without long-term current use of insulin (HCC)  E11.9 250.00 AMB POC HEMOGLOBIN A1C      AMB POC GLUCOSE BLOOD, BY GLUCOSE MONITORING DEVICE      losartan (COZAAR) 25 mg tablet      pregabalin (LYRICA) 150 mg capsule   4. Facial neuropathy  G51.9 351.9 SITagliptin (Januvia) 100 mg tablet     Orders Placed This Encounter    AMB POC HEMOGLOBIN A1C    AMB POC GLUCOSE BLOOD, BY GLUCOSE MONITORING DEVICE     lose weight, increase physical activity, follow low fat diet, follow low salt diet, continue present plan, routine labs ordered, call if any problems  There are no Patient Instructions on file for this visit. I have reviewed with the patient details of the assessment and plan and all questions were answered. Relevent patient education was performed    An After Visit Summary was printed and given to the patient.

## 2021-07-26 NOTE — PATIENT INSTRUCTIONS
[employer]    Thank you for requesting access to [employer]. Please follow the instructions below to securely access and download your online medical record. [employer] allows you to send messages to your doctor, view your test results, renew your prescriptions, schedule appointments, and more. How Do I Sign Up? 1. In your internet browser, go to [email or web address]  2. Click on the First Time User? Click Here link in the Sign In box. You will be redirect to the New Member Sign Up page. 3. Enter your [employer] Access Code exactly as it appears below. You will not need to use this code after youve completed the sign-up process. If you do not sign up before the expiration date, you must request a new code. [employer] Access Code: Activation code not generated  Current [employer] Status: Active (This is the date your [employer] access code will )    4. Enter the last four digits of your Social Security Number (xxxx) and Date of Birth (mm/dd/yyyy) as indicated and click Submit. You will be taken to the next sign-up page. 5. Create a [employer] ID. This will be your [employer] login ID and cannot be changed, so think of one that is secure and easy to remember. 6. Create a [employer] password. You can change your password at any time. 7. Enter your Password Reset Question and Answer. This can be used at a later time if you forget your password. 8. Enter your e-mail address. You will receive e-mail notification when new information is available in [address] 9. Click Sign Up. You can now view and download portions of your medical record. 10. Click the Download Summary menu link to download a portable copy of your medical information. Additional Information    If you have questions, please visit the Frequently Asked Questions section of the [employer] website at [email or web address] Remember, [employer] is NOT to be used for urgent needs. For medical emergencies, dial 911.

## 2021-07-27 LAB
ALBUMIN SERPL-MCNC: 4.7 G/DL (ref 3.8–4.8)
ALBUMIN/GLOB SERPL: 1.8 {RATIO} (ref 1.2–2.2)
ALP SERPL-CCNC: 90 IU/L (ref 48–121)
ALT SERPL-CCNC: 40 IU/L (ref 0–32)
AST SERPL-CCNC: 32 IU/L (ref 0–40)
BILIRUB SERPL-MCNC: 0.4 MG/DL (ref 0–1.2)
BUN SERPL-MCNC: 17 MG/DL (ref 8–27)
BUN/CREAT SERPL: 18 (ref 12–28)
CALCIUM SERPL-MCNC: 10.3 MG/DL (ref 8.7–10.3)
CHLORIDE SERPL-SCNC: 100 MMOL/L (ref 96–106)
CO2 SERPL-SCNC: 23 MMOL/L (ref 20–29)
CREAT SERPL-MCNC: 0.96 MG/DL (ref 0.57–1)
ERYTHROCYTE [DISTWIDTH] IN BLOOD BY AUTOMATED COUNT: 14.2 % (ref 11.7–15.4)
GLOBULIN SER CALC-MCNC: 2.6 G/DL (ref 1.5–4.5)
GLUCOSE SERPL-MCNC: 156 MG/DL (ref 65–99)
HCT VFR BLD AUTO: 38.9 % (ref 34–46.6)
HGB BLD-MCNC: 13 G/DL (ref 11.1–15.9)
MCH RBC QN AUTO: 27.8 PG (ref 26.6–33)
MCHC RBC AUTO-ENTMCNC: 33.4 G/DL (ref 31.5–35.7)
MCV RBC AUTO: 83 FL (ref 79–97)
PLATELET # BLD AUTO: 474 X10E3/UL (ref 150–450)
POTASSIUM SERPL-SCNC: 5 MMOL/L (ref 3.5–5.2)
PROT SERPL-MCNC: 7.3 G/DL (ref 6–8.5)
RBC # BLD AUTO: 4.67 X10E6/UL (ref 3.77–5.28)
SODIUM SERPL-SCNC: 138 MMOL/L (ref 134–144)
WBC # BLD AUTO: 5.8 X10E3/UL (ref 3.4–10.8)

## 2021-10-17 DIAGNOSIS — E78.00 HYPERCHOLESTEREMIA: ICD-10-CM

## 2021-10-17 DIAGNOSIS — E11.9 CONTROLLED TYPE 2 DIABETES MELLITUS WITHOUT COMPLICATION, WITHOUT LONG-TERM CURRENT USE OF INSULIN (HCC): ICD-10-CM

## 2021-10-17 DIAGNOSIS — I10 ESSENTIAL HYPERTENSION, BENIGN: ICD-10-CM

## 2021-10-17 RX ORDER — LOSARTAN POTASSIUM 25 MG/1
TABLET ORAL
Qty: 90 TABLET | Refills: 0 | Status: SHIPPED | OUTPATIENT
Start: 2021-10-17 | End: 2022-02-20

## 2021-10-26 ENCOUNTER — OFFICE VISIT (OUTPATIENT)
Dept: INTERNAL MEDICINE CLINIC | Age: 66
End: 2021-10-26
Payer: MEDICARE

## 2021-10-26 VITALS
DIASTOLIC BLOOD PRESSURE: 90 MMHG | HEART RATE: 92 BPM | BODY MASS INDEX: 31.53 KG/M2 | OXYGEN SATURATION: 97 % | RESPIRATION RATE: 16 BRPM | SYSTOLIC BLOOD PRESSURE: 132 MMHG | HEIGHT: 61 IN | WEIGHT: 167 LBS | TEMPERATURE: 98 F

## 2021-10-26 DIAGNOSIS — I10 ESSENTIAL HYPERTENSION, BENIGN: ICD-10-CM

## 2021-10-26 DIAGNOSIS — F33.0 MAJOR DEPRESSIVE DISORDER, RECURRENT, MILD (HCC): ICD-10-CM

## 2021-10-26 DIAGNOSIS — E11.9 CONTROLLED TYPE 2 DIABETES MELLITUS WITHOUT COMPLICATION, WITHOUT LONG-TERM CURRENT USE OF INSULIN (HCC): ICD-10-CM

## 2021-10-26 DIAGNOSIS — E78.00 HYPERCHOLESTEREMIA: Primary | ICD-10-CM

## 2021-10-26 DIAGNOSIS — F33.1 MAJOR DEPRESSIVE DISORDER, RECURRENT, MODERATE (HCC): ICD-10-CM

## 2021-10-26 PROBLEM — F33.9 MAJOR DEPRESSIVE DISORDER, RECURRENT, UNSPECIFIED (HCC): Status: ACTIVE | Noted: 2021-10-26

## 2021-10-26 LAB
CHOLEST SERPL-MCNC: 208 MG/DL
GLUCOSE POC: 165 MG/DL
HBA1C MFR BLD HPLC: 7.2 %
HDLC SERPL-MCNC: 50 MG/DL
LDL CHOLESTEROL POC: 136 MG/DL
NON-HDL CHOLESTEROL, 011976: 159
TCHOL/HDL RATIO (POC): 4.2
TRIGL SERPL-MCNC: 114 MG/DL

## 2021-10-26 PROCEDURE — G8752 SYS BP LESS 140: HCPCS | Performed by: INTERNAL MEDICINE

## 2021-10-26 PROCEDURE — G9899 SCRN MAM PERF RSLTS DOC: HCPCS | Performed by: INTERNAL MEDICINE

## 2021-10-26 PROCEDURE — 3017F COLORECTAL CA SCREEN DOC REV: CPT | Performed by: INTERNAL MEDICINE

## 2021-10-26 PROCEDURE — G8536 NO DOC ELDER MAL SCRN: HCPCS | Performed by: INTERNAL MEDICINE

## 2021-10-26 PROCEDURE — 99214 OFFICE O/P EST MOD 30 MIN: CPT | Performed by: INTERNAL MEDICINE

## 2021-10-26 PROCEDURE — G8427 DOCREV CUR MEDS BY ELIG CLIN: HCPCS | Performed by: INTERNAL MEDICINE

## 2021-10-26 PROCEDURE — G8510 SCR DEP NEG, NO PLAN REQD: HCPCS | Performed by: INTERNAL MEDICINE

## 2021-10-26 PROCEDURE — 1101F PT FALLS ASSESS-DOCD LE1/YR: CPT | Performed by: INTERNAL MEDICINE

## 2021-10-26 PROCEDURE — 2022F DILAT RTA XM EVC RTNOPTHY: CPT | Performed by: INTERNAL MEDICINE

## 2021-10-26 PROCEDURE — G8399 PT W/DXA RESULTS DOCUMENT: HCPCS | Performed by: INTERNAL MEDICINE

## 2021-10-26 PROCEDURE — 1090F PRES/ABSN URINE INCON ASSESS: CPT | Performed by: INTERNAL MEDICINE

## 2021-10-26 PROCEDURE — G8417 CALC BMI ABV UP PARAM F/U: HCPCS | Performed by: INTERNAL MEDICINE

## 2021-10-26 PROCEDURE — G8755 DIAS BP > OR = 90: HCPCS | Performed by: INTERNAL MEDICINE

## 2021-10-26 PROCEDURE — 83036 HEMOGLOBIN GLYCOSYLATED A1C: CPT | Performed by: INTERNAL MEDICINE

## 2021-10-26 PROCEDURE — 80061 LIPID PANEL: CPT | Performed by: INTERNAL MEDICINE

## 2021-10-26 PROCEDURE — 82962 GLUCOSE BLOOD TEST: CPT | Performed by: INTERNAL MEDICINE

## 2021-10-26 RX ORDER — PAROXETINE 10 MG/1
10 TABLET, FILM COATED ORAL DAILY
Qty: 30 TABLET | Refills: 3 | Status: SHIPPED | OUTPATIENT
Start: 2021-10-26 | End: 2022-03-12

## 2021-10-26 NOTE — PROGRESS NOTES
Anthony Velasquez is a 77 y.o. female and presents with Diabetes, Cholesterol Problem, Hypertension, GERD, and Diabetic Foot Exam  .    Hypertension Review:  The patient has hypertension . Diet and Lifestyle: generally follows a low sodium diet, exercises sporadically  Home BP Monitoring: is not measured at home. Pertinent ROS: taking medications as instructed, no medication side effects noted, no TIA's, no chest pain on exertion, no dyspnea on exertion, no swelling of ankles. Dyslipidemia Review:  Patient presents for evaluation of lipids. Compliance with treatment thus far has been excellent. A repeat fasting lipid profile was not done. The patient does not use medications that may worsen dyslipidemias . The patient exercises sporadically. Diabetes Mellitus Review:  She has diabetes mellitus. Diabetic ROS - medication compliance: compliant all of the time, diabetic diet compliance: compliant all of the time, home glucose monitoring: is performed. Known diabetic complications: none  Cardiovascular risk factors: family history, dyslipidemia, diabetes mellitus, obesity, hypertension  Current diabetic medications include oral agents/insulin   Eye exam current (within one year): no  Weight trend: stable  Prior visit with dietician: no  Current diet: \"healthy\" diet  in general  Current exercise: walking  Current monitoring regimen: home blood tests - daily  Home blood sugar records: trend: stable  Any episodes of hypoglycemia? no  Lab review: orders written for new lab studies as appropriate; see orders. GERD Review:   Patient has a history of gastroesophageal reflux with heartburn. Symptoms have been present for a few months. She denies dysphagia. She  has not lost weight. She denies melena, hematochezia, hematemesis, and coffee ground emesis. This has been associated with fullness after meals. She denies abdominal bloating and none.   Medical therapy in the past has included proton pump inhibitor    Depression Review:  Patient is seen for followup of depression. Ongoing depressed mood, psychomotor agitation, psychomotor retardation, feelings of worthlessness/guilt, difficulty concentrating and hopelessness Treatment includes no medication and no other therapies. She states her sister passed. She denies recurrent thoughts of death and suicidal thoughts without plan. She experiences the following side effects from the treatment: none. The patient is not followed by psychiatry. Review of Systems  Constitutional: negative for fevers, chills, anorexia and weight loss  Eyes:   negative for visual disturbance and irritation  ENT:   negative for tinnitus,sore throat,nasal congestion,ear pains. hoarseness  Respiratory:  negative for cough, hemoptysis, dyspnea,wheezing  CV:   negative for chest pain, palpitations, lower extremity edema  GI:   negative for nausea, vomiting, diarrhea, abdominal pain,melena  Endo:               negative for polyuria,polydipsia,polyphagia,heat intolerance  Genitourinary: negative for frequency, dysuria and hematuria  Integument:  negative for rash and pruritus  Hematologic:  negative for easy bruising and gum/nose bleeding  Musculoskel: negative for myalgias, arthralgias, back pain, muscle weakness, joint pain  Neurological:  negative for headaches, dizziness, vertigo, memory problems and gait   Behavl/Psych:  feelings of anxiety, depression, mood changes    Past Medical History:   Diagnosis Date    Acute cholecystitis 2/9/2011    Acute cystitis 2/9/2011    Cholecystitis 2/9/2011    Cholelithiasis 2/9/2011    Chronic Pain 2/9/2011    Essential hypertension, benign 2/9/2011    GERD (gastroesophageal reflux disease)     GERD, Gastro- Esophageal Reflux Diease (acid reflux) 2/9/2011    Herniated nucleus pulposus 2/9/2011    Hypercholesteremia 2/9/2011    Hypercholesteremia 2/9/2011    Hypertension     Neurogenic bladder, NOS 2/9/2011     Past Surgical History: Procedure Laterality Date    HX GYN      HX HAMMER TOE REPAIR  6/21/2016    left foot      Social History     Socioeconomic History    Marital status:      Spouse name: Not on file    Number of children: Not on file    Years of education: Not on file    Highest education level: Not on file   Tobacco Use    Smoking status: Never Smoker    Smokeless tobacco: Never Used   Substance and Sexual Activity    Alcohol use: No    Drug use: No    Sexual activity: Yes     Partners: Male     Social Determinants of Health     Financial Resource Strain:     Difficulty of Paying Living Expenses:    Food Insecurity:     Worried About Running Out of Food in the Last Year:     [address] in the Last Year:    Transportation Needs:     Lack of Transportation (Medical):  Lack of Transportation (Non-Medical):    Physical Activity:     Days of Exercise per Week:     Minutes of Exercise per Session:    Stress:     Feeling of Stress :    Social Connections:     Frequency of Communication with Friends and Family:     Frequency of Social Gatherings with Friends and Family:     Attends Hinduism Services:     Active Member of Clubs or Organizations:     Attends Club or Organization Meetings:     Marital Status:      Family History   Problem Relation Age of Onset    Heart Disease Mother     Cancer Father      Current Outpatient Medications   Medication Sig Dispense Refill    pregabalin (LYRICA) 150 mg capsule TAKE 1 CAPSULE BY MOUTH TWICE A DAY 60 Capsule 3    fluticasone propionate (FLONASE) 50 mcg/actuation nasal spray 2 Sprays by Both Nostrils route daily.  1 Bottle 12    pantoprazole (PROTONIX) 40 mg tablet TAKE 1 TABLET BY MOUTH EVERY DAY 90 Tablet 1    fenofibrate micronized (LOFIBRA) 134 mg capsule TAKE 1 CAPSULE BY MOUTH EVERY DAY 90 Capsule 2    celecoxib (CELEBREX) 200 mg capsule TAKE 1 CAPSULE BY MOUTH EVERY DAY 30 Capsule 11    SITagliptin (Januvia) 100 mg tablet TAKE 1 TABLET BY MOUTH EVERY DAY 30 Tablet 11    baclofen (LIORESAL) 10 mg tablet Take 1 Tablet by mouth two (2) times a day. 60 Tablet 12    clotrimazole-betamethasone (LOTRISONE) topical cream Apply  to affected area two (2) times a day. 45 g 3    metFORMIN (GLUCOPHAGE) 1,000 mg tablet TAKE 1 TABLET BY MOUTH TWICE A DAY 60 Tab 12    cetirizine (ZYRTEC) 10 mg tablet Take 1 Tab by mouth daily. 30 Tab 3    irbesartan (AVAPRO) 150 mg tablet TAKE ONE TABLET EVERY NIGHT AT BEDTIME. 30 Tab 12    aspirin 81 mg tablet Take  by mouth.  losartan (COZAAR) 25 mg tablet TAKE 1 TABLET BY MOUTH EVERY DAY 90 Tablet 0    DULoxetine (CYMBALTA) 60 mg capsule TAKE 1 CAPSULE BY MOUTH EVERY DAY (Patient not taking: Reported on 12/1/2020) 30 Cap 3    SUMAtriptan (IMITREX) 100 mg tablet TAKE 1 TABLET BY MOUTH ONCE AS NEEDED FOR MIGRAINE FOR UP TO 1 DOSE (Patient not taking: Reported on 12/1/2020) 8 Tab 3    naloxone 2 mg/actuation spry Use 1 spray intranasally, then discard. Repeat with new spray every 2 min as needed for opioid overdose symptoms, alternating nostrils. (Patient not taking: Reported on 8/4/2020) 2 Syringe 0    diphenoxylate-atropine (LOMOTIL) 2.5-0.025 mg per tablet Take 1 tablet by mouth four (4) times daily as needed for Diarrhea.  (Patient not taking: Reported on 7/26/2021) 60 tablet 0     Allergies   Allergen Reactions    Diclofenac Itching       Objective:  Visit Vitals  BP (!) 132/90   Pulse 92   Temp 98 °F (36.7 °C) (Oral)   Resp 16   Ht 5' 1\" (1.549 m)   Wt 167 lb (75.8 kg)   SpO2 97%   BMI 31.55 kg/m²     Physical Exam:   General appearance - alert, well appearing, and in no distress  Mental status - alert, oriented to person, place, and time  EYE-LAURI, EOMI, corneas normal, no foreign bodies  ENT-ENT exam normal, no neck nodes or sinus tenderness  Nose - normal and patent, no erythema, discharge or polyps  Mouth - mucous membranes moist, pharynx normal without lesions  Neck - supple, no significant adenopathy Chest - clear to auscultation, no wheezes, rales or rhonchi, symmetric air entry   Heart - normal rate, regular rhythm, normal S1, S2, no murmurs, rubs, clicks or gallops   Abdomen - soft, nontender, nondistended, no masses or organomegaly  Lymph- no adenopathy palpable  Ext-peripheral pulses normal, no pedal edema, no clubbing or cyanosis  Skin-Warm and dry. no hyperpigmentation, vitiligo, or suspicious lesions  Neuro -alert, oriented, normal speech, no focal findings or movement disorder noted  Neck-normal C-spine, no tenderness, full ROM without pain  Feet-no nail deformities or callus formation with good pulses noted      Results for orders placed or performed in visit on 07/26/21   CBC W/O DIFF   Result Value Ref Range    WBC 5.8 3.4 - 10.8 x10E3/uL    RBC 4.67 3.77 - 5.28 x10E6/uL    HGB 13.0 11.1 - 15.9 g/dL    HCT 38.9 34.0 - 46.6 %    MCV 83 79 - 97 fL    MCH 27.8 26.6 - 33.0 pg    MCHC 33.4 31.5 - 35.7 g/dL    RDW 14.2 11.7 - 15.4 %    PLATELET 498 (H) 876 - 450 N70M5/AK   METABOLIC PANEL, COMPREHENSIVE   Result Value Ref Range    Glucose 156 (H) 65 - 99 mg/dL    BUN 17 8 - 27 mg/dL    Creatinine 0.96 0.57 - 1.00 mg/dL    GFR est non-AA 62 >59 mL/min/1.73    GFR est AA 72 >59 mL/min/1.73    BUN/Creatinine ratio 18 12 - 28    Sodium 138 134 - 144 mmol/L    Potassium 5.0 3.5 - 5.2 mmol/L    Chloride 100 96 - 106 mmol/L    CO2 23 20 - 29 mmol/L    Calcium 10.3 8.7 - 10.3 mg/dL    Protein, total 7.3 6.0 - 8.5 g/dL    Albumin 4.7 3.8 - 4.8 g/dL    GLOBULIN, TOTAL 2.6 1.5 - 4.5 g/dL    A-G Ratio 1.8 1.2 - 2.2    Bilirubin, total 0.4 0.0 - 1.2 mg/dL    Alk. phosphatase 90 48 - 121 IU/L    AST (SGOT) 32 0 - 40 IU/L    ALT (SGPT) 40 (H) 0 - 32 IU/L   AMB POC HEMOGLOBIN A1C   Result Value Ref Range    Hemoglobin A1c (POC) 7.5 %   AMB POC GLUCOSE BLOOD, BY GLUCOSE MONITORING DEVICE   Result Value Ref Range    Glucose  MG/DL       Assessment/Plan:    ICD-10-CM ICD-9-CM    1.  Hypercholesteremia  E78.00 272.0 AMB POC LIPID PROFILE      AMB POC GLUCOSE BLOOD, BY GLUCOSE MONITORING DEVICE      AMB POC HEMOGLOBIN A1C   2. Essential hypertension, benign  I10 401.1 AMB POC LIPID PROFILE      AMB POC GLUCOSE BLOOD, BY GLUCOSE MONITORING DEVICE      AMB POC HEMOGLOBIN A1C   3. Controlled type 2 diabetes mellitus without complication, without long-term current use of insulin (HCC)  E11.9 250.00 AMB POC LIPID PROFILE      AMB POC GLUCOSE BLOOD, BY GLUCOSE MONITORING DEVICE      AMB POC HEMOGLOBIN A1C     Orders Placed This Encounter    AMB POC LIPID PROFILE    AMB POC GLUCOSE BLOOD, BY GLUCOSE MONITORING DEVICE    AMB POC HEMOGLOBIN A1C     lose weight, increase physical activity, follow low fat diet, follow low salt diet, continue present plan, routine labs ordered, call if any problems  There are no Patient Instructions on file for this visit. I have reviewed with the patient details of the assessment and plan and all questions were answered. Relevent patient education was performed    An After Visit Summary was printed and given to the patient.

## 2021-10-26 NOTE — PATIENT INSTRUCTIONS
[employer] Activation    Thank you for requesting access to invi. Please follow the instructions below to securely access and download your online medical record. invi allows you to send messages to your doctor, view your test results, renew your prescriptions, schedule appointments, and more. How Do I Sign Up? 1. In your internet browser, go to [email or web address])  2. Click on the First Time User? Click Here link in the Sign In box. You will be redirect to the New Member Sign Up page. 3. Enter your invi Access Code exactly as it appears below. You will not need to use this code after youve completed the sign-up process. If you do not sign up before the expiration date, you must request a new code. invi Access Code: Activation code not generated  Current invi Status: Active (This is the date your invi access code will )    4. Enter the last four digits of your Social Security Number (xxxx) and Date of Birth (mm/dd/yyyy) as indicated and click Submit. You will be taken to the next sign-up page. 5. Create a invi ID. This will be your invi login ID and cannot be changed, so think of one that is secure and easy to remember. 6. Create a invi password. You can change your password at any time. 7. Enter your Password Reset Question and Answer. This can be used at a later time if you forget your password. 8. Enter your e-mail address. You will receive e-mail notification when new information is available in [address] 9. Click Sign Up. You can now view and download portions of your medical record. 10. Click the Download Summary menu link to download a portable copy of your medical information. Additional Information    If you have questions, please visit the Frequently Asked Questions section of the invi website at [email or web address] Remember, invi is NOT to be used for urgent needs. For medical emergencies, dial 911.

## 2022-02-17 NOTE — PATIENT INSTRUCTIONS
[employer] Activation Thank you for requesting access to archify. Please follow the instructions below to securely access and download your online medical record. archify allows you to send messages to your doctor, view your test results, renew your prescriptions, schedule appointments, and more. How Do I Sign Up? 1. In your internet browser, go to [email or web address] 
2. Click on the First Time User? Click Here link in the Sign In box. You will be redirect to the New Member Sign Up page. 3. Enter your archify Access Code exactly as it appears below. You will not need to use this code after youve completed the sign-up process. If you do not sign up before the expiration date, you must request a new code. archify Access Code: Activation code not generated Current archify Status: Active (This is the date your archify access code will ) 4. Enter the last four digits of your Social Security Number (xxxx) and Date of Birth (mm/dd/yyyy) as indicated and click Submit. You will be taken to the next sign-up page. 5. Create a archify ID. This will be your archify login ID and cannot be changed, so think of one that is secure and easy to remember. 6. Create a archify password. You can change your password at any time. 7. Enter your Password Reset Question and Answer. This can be used at a later time if you forget your password. 8. Enter your e-mail address. You will receive e-mail notification when new information is available in [address] 9. Click Sign Up. You can now view and download portions of your medical record. 10. Click the Download Summary menu link to download a portable copy of your medical information. Additional Information If you have questions, please visit the Frequently Asked Questions section of the archify website at [email or web address] Remember, archify is NOT to be used for urgent needs. For medical emergencies, dial 911. [Follow-Up Visit] : a follow-up visit for [Morbid Obesity (BMI>40)] : morbid obesity (bmi>40) [FreeTextEntry2] : Patient presents for Preoperative Bariatric visit

## 2022-02-20 DIAGNOSIS — I10 ESSENTIAL HYPERTENSION, BENIGN: ICD-10-CM

## 2022-02-20 DIAGNOSIS — E78.00 HYPERCHOLESTEREMIA: ICD-10-CM

## 2022-02-20 DIAGNOSIS — E11.9 CONTROLLED TYPE 2 DIABETES MELLITUS WITHOUT COMPLICATION, WITHOUT LONG-TERM CURRENT USE OF INSULIN (HCC): ICD-10-CM

## 2022-02-20 RX ORDER — LOSARTAN POTASSIUM 25 MG/1
TABLET ORAL
Qty: 90 TABLET | Refills: 0 | Status: SHIPPED | OUTPATIENT
Start: 2022-02-20 | End: 2022-03-01 | Stop reason: SDUPTHER

## 2022-03-01 ENCOUNTER — OFFICE VISIT (OUTPATIENT)
Dept: INTERNAL MEDICINE CLINIC | Age: 67
End: 2022-03-01
Payer: MEDICARE

## 2022-03-01 VITALS
RESPIRATION RATE: 17 BRPM | SYSTOLIC BLOOD PRESSURE: 130 MMHG | WEIGHT: 160 LBS | OXYGEN SATURATION: 97 % | HEIGHT: 61 IN | TEMPERATURE: 98.5 F | BODY MASS INDEX: 30.21 KG/M2 | DIASTOLIC BLOOD PRESSURE: 80 MMHG | HEART RATE: 88 BPM

## 2022-03-01 DIAGNOSIS — E11.42 TYPE 2 DIABETES MELLITUS WITH DIABETIC POLYNEUROPATHY, WITHOUT LONG-TERM CURRENT USE OF INSULIN (HCC): ICD-10-CM

## 2022-03-01 DIAGNOSIS — I10 ESSENTIAL HYPERTENSION, BENIGN: ICD-10-CM

## 2022-03-01 DIAGNOSIS — E11.9 CONTROLLED TYPE 2 DIABETES MELLITUS WITHOUT COMPLICATION, WITHOUT LONG-TERM CURRENT USE OF INSULIN (HCC): ICD-10-CM

## 2022-03-01 DIAGNOSIS — E78.00 HYPERCHOLESTEREMIA: ICD-10-CM

## 2022-03-01 LAB
CHOLEST SERPL-MCNC: 245 MG/DL
GLUCOSE POC: 166 MG/DL
HBA1C MFR BLD HPLC: 7.2 %
HDLC SERPL-MCNC: 49 MG/DL
LDL CHOLESTEROL POC: 149 MG/DL
NON-HDL GOAL (POC): 196
TCHOL/HDL RATIO (POC): 5
TRIGL SERPL-MCNC: 234 MG/DL

## 2022-03-01 PROCEDURE — 1101F PT FALLS ASSESS-DOCD LE1/YR: CPT | Performed by: INTERNAL MEDICINE

## 2022-03-01 PROCEDURE — G9717 DOC PT DX DEP/BP F/U NT REQ: HCPCS | Performed by: INTERNAL MEDICINE

## 2022-03-01 PROCEDURE — G9899 SCRN MAM PERF RSLTS DOC: HCPCS | Performed by: INTERNAL MEDICINE

## 2022-03-01 PROCEDURE — 3017F COLORECTAL CA SCREEN DOC REV: CPT | Performed by: INTERNAL MEDICINE

## 2022-03-01 PROCEDURE — 82962 GLUCOSE BLOOD TEST: CPT | Performed by: INTERNAL MEDICINE

## 2022-03-01 PROCEDURE — 99214 OFFICE O/P EST MOD 30 MIN: CPT | Performed by: INTERNAL MEDICINE

## 2022-03-01 PROCEDURE — G8754 DIAS BP LESS 90: HCPCS | Performed by: INTERNAL MEDICINE

## 2022-03-01 PROCEDURE — G8752 SYS BP LESS 140: HCPCS | Performed by: INTERNAL MEDICINE

## 2022-03-01 PROCEDURE — G8399 PT W/DXA RESULTS DOCUMENT: HCPCS | Performed by: INTERNAL MEDICINE

## 2022-03-01 PROCEDURE — 2022F DILAT RTA XM EVC RTNOPTHY: CPT | Performed by: INTERNAL MEDICINE

## 2022-03-01 PROCEDURE — G8536 NO DOC ELDER MAL SCRN: HCPCS | Performed by: INTERNAL MEDICINE

## 2022-03-01 PROCEDURE — 80061 LIPID PANEL: CPT | Performed by: INTERNAL MEDICINE

## 2022-03-01 PROCEDURE — G8427 DOCREV CUR MEDS BY ELIG CLIN: HCPCS | Performed by: INTERNAL MEDICINE

## 2022-03-01 PROCEDURE — 3046F HEMOGLOBIN A1C LEVEL >9.0%: CPT | Performed by: INTERNAL MEDICINE

## 2022-03-01 PROCEDURE — 83036 HEMOGLOBIN GLYCOSYLATED A1C: CPT | Performed by: INTERNAL MEDICINE

## 2022-03-01 PROCEDURE — G8417 CALC BMI ABV UP PARAM F/U: HCPCS | Performed by: INTERNAL MEDICINE

## 2022-03-01 PROCEDURE — 1090F PRES/ABSN URINE INCON ASSESS: CPT | Performed by: INTERNAL MEDICINE

## 2022-03-01 RX ORDER — PANTOPRAZOLE SODIUM 40 MG/1
TABLET, DELAYED RELEASE ORAL
Qty: 90 TABLET | Refills: 1 | Status: SHIPPED | OUTPATIENT
Start: 2022-03-01

## 2022-03-01 RX ORDER — PREGABALIN 300 MG/1
CAPSULE ORAL
Qty: 60 CAPSULE | Refills: 0 | Status: SHIPPED | OUTPATIENT
Start: 2022-03-01 | End: 2022-04-19

## 2022-03-01 RX ORDER — LOSARTAN POTASSIUM 25 MG/1
25 TABLET ORAL DAILY
Qty: 90 TABLET | Refills: 0 | Status: SHIPPED | OUTPATIENT
Start: 2022-03-01 | End: 2022-08-01 | Stop reason: SINTOL

## 2022-03-01 RX ORDER — METFORMIN HYDROCHLORIDE 1000 MG/1
1000 TABLET ORAL 2 TIMES DAILY
Qty: 60 TABLET | Refills: 12 | Status: SHIPPED | OUTPATIENT
Start: 2022-03-01

## 2022-03-01 RX ORDER — BACLOFEN 10 MG/1
10 TABLET ORAL 2 TIMES DAILY
Qty: 60 TABLET | Refills: 12 | Status: SHIPPED | OUTPATIENT
Start: 2022-03-01

## 2022-03-01 NOTE — PROGRESS NOTES
John Flores is a 77 y.o. female and presents with Diabetes, Foot Pain, and Cough  . She has constant night time cramping in her feet and a coldness    She been coughing frequent    Hypertension Review:  The patient has hypertension . Diet and Lifestyle: generally follows a low sodium diet, exercises sporadically  Home BP Monitoring: is not measured at home. Pertinent ROS: taking medications as instructed, no medication side effects noted, no TIA's, no chest pain on exertion, no dyspnea on exertion, no swelling of ankles. Dyslipidemia Review:  Patient presents for evaluation of lipids. Compliance with treatment thus far has been excellent. A repeat fasting lipid profile was not done. The patient does not use medications that may worsen dyslipidemias . The patient exercises sporadically. Diabetes Mellitus Review:  She has diabetes mellitus. Diabetic ROS - medication compliance: compliant all of the time, diabetic diet compliance: compliant all of the time, home glucose monitoring: is performed. Known diabetic complications: none  Cardiovascular risk factors: family history, dyslipidemia, diabetes mellitus, obesity, hypertension  Current diabetic medications include oral agents/insulin   Eye exam current (within one year): no  Weight trend: stable  Prior visit with dietician: no  Current diet: \"healthy\" diet  in general  Current exercise: walking  Current monitoring regimen: home blood tests - daily  Home blood sugar records: trend: stable  Any episodes of hypoglycemia? no  Lab review: orders written for new lab studies as appropriate; see orders. GERD Review:   Patient has a history of gastroesophageal reflux with heartburn. Symptoms have been present for a few months. She denies dysphagia. She  has not lost weight. She denies melena, hematochezia, hematemesis, and coffee ground emesis. This has been associated with fullness after meals. She denies abdominal bloating and none.   Medical therapy in the past has included proton pump inhibitor    Depression Review:  Patient is seen for followup of depression. Ongoing depressed mood, psychomotor agitation, psychomotor retardation, feelings of worthlessness/guilt, difficulty concentrating and hopelessness Treatment includes no medication and no other therapies. She states her sister passed. She denies recurrent thoughts of death and suicidal thoughts without plan. She experiences the following side effects from the treatment: none. The patient is not followed by psychiatry. Review of Systems  Constitutional: negative for fevers, chills, anorexia and weight loss  Eyes:   negative for visual disturbance and irritation  ENT:   negative for tinnitus,sore throat,nasal congestion,ear pains. hoarseness  Respiratory:  negative for cough, hemoptysis, dyspnea,wheezing  CV:   negative for chest pain, palpitations, lower extremity edema  GI:   negative for nausea, vomiting, diarrhea, abdominal pain,melena  Endo:               negative for polyuria,polydipsia,polyphagia,heat intolerance  Genitourinary: negative for frequency, dysuria and hematuria  Integument:  negative for rash and pruritus  Hematologic:  negative for easy bruising and gum/nose bleeding  Musculoskel: negative for myalgias, arthralgias, back pain, muscle weakness, joint pain  Neurological:  negative for headaches, dizziness, vertigo, memory problems and gait   Behavl/Psych:  feelings of anxiety, depression, mood changes    Past Medical History:   Diagnosis Date    Acute cholecystitis 2/9/2011    Acute cystitis 2/9/2011    Cholecystitis 2/9/2011    Cholelithiasis 2/9/2011    Chronic Pain 2/9/2011    Essential hypertension, benign 2/9/2011    GERD (gastroesophageal reflux disease)     GERD, Gastro- Esophageal Reflux Diease (acid reflux) 2/9/2011    Herniated nucleus pulposus 2/9/2011    Hypercholesteremia 2/9/2011    Hypercholesteremia 2/9/2011    Hypertension     Major depressive disorder, recurrent, mild 10/26/2021    Neurogenic bladder, NOS 2/9/2011     Past Surgical History:   Procedure Laterality Date    HX GYN      HX HAMMER TOE REPAIR  6/21/2016    left foot      Social History     Socioeconomic History    Marital status:    Tobacco Use    Smoking status: Never Smoker    Smokeless tobacco: Never Used   Substance and Sexual Activity    Alcohol use: No    Drug use: No    Sexual activity: Yes     Partners: Male     Family History   Problem Relation Age of Onset    Heart Disease Mother     Cancer Father      Current Outpatient Medications   Medication Sig Dispense Refill    losartan (COZAAR) 25 mg tablet TAKE 1 TABLET BY MOUTH EVERY DAY 90 Tablet 0    PARoxetine (PAXIL) 10 mg tablet Take 1 Tablet by mouth daily. 30 Tablet 3    pregabalin (LYRICA) 150 mg capsule TAKE 1 CAPSULE BY MOUTH TWICE A DAY 60 Capsule 3    fluticasone propionate (FLONASE) 50 mcg/actuation nasal spray 2 Sprays by Both Nostrils route daily. 1 Bottle 12    pantoprazole (PROTONIX) 40 mg tablet TAKE 1 TABLET BY MOUTH EVERY DAY 90 Tablet 1    fenofibrate micronized (LOFIBRA) 134 mg capsule TAKE 1 CAPSULE BY MOUTH EVERY DAY 90 Capsule 2    celecoxib (CELEBREX) 200 mg capsule TAKE 1 CAPSULE BY MOUTH EVERY DAY 30 Capsule 11    baclofen (LIORESAL) 10 mg tablet Take 1 Tablet by mouth two (2) times a day. 60 Tablet 12    clotrimazole-betamethasone (LOTRISONE) topical cream Apply  to affected area two (2) times a day. 45 g 3    metFORMIN (GLUCOPHAGE) 1,000 mg tablet TAKE 1 TABLET BY MOUTH TWICE A DAY 60 Tab 12    cetirizine (ZYRTEC) 10 mg tablet Take 1 Tab by mouth daily. 30 Tab 3    irbesartan (AVAPRO) 150 mg tablet TAKE ONE TABLET EVERY NIGHT AT BEDTIME. 30 Tab 12    aspirin 81 mg tablet Take  by mouth.       SITagliptin (Januvia) 100 mg tablet TAKE 1 TABLET BY MOUTH EVERY DAY 30 Tablet 11    diphenoxylate-atropine (LOMOTIL) 2.5-0.025 mg per tablet Take 1 tablet by mouth four (4) times daily as needed for Diarrhea. (Patient not taking: Reported on 3/1/2022) 60 tablet 0     Allergies   Allergen Reactions    Diclofenac Itching       Objective:  Visit Vitals  /80 (BP 1 Location: Right arm, BP Patient Position: Sitting, BP Cuff Size: Adult)   Pulse 88   Temp 98.5 °F (36.9 °C) (Oral)   Resp 17   Ht 5' 1\" (1.549 m)   Wt 160 lb (72.6 kg)   SpO2 97%   BMI 30.23 kg/m²     Physical Exam:   General appearance - alert, well appearing, and in no distress  Mental status - alert, oriented to person, place, and time  EYE-LAURI, EOMI, corneas normal, no foreign bodies  ENT-ENT exam normal, no neck nodes or sinus tenderness  Nose - normal and patent, no erythema, discharge or polyps  Mouth - mucous membranes moist, pharynx normal without lesions  Neck - supple, no significant adenopathy   Chest - clear to auscultation, no wheezes, rales or rhonchi, symmetric air entry   Heart - normal rate, regular rhythm, normal S1, S2, no murmurs, rubs, clicks or gallops   Abdomen - soft, nontender, nondistended, no masses or organomegaly  Lymph- no adenopathy palpable  Ext-peripheral pulses normal, no pedal edema, no clubbing or cyanosis  Skin-Warm and dry.  no hyperpigmentation, vitiligo, or suspicious lesions  Neuro -alert, oriented, normal speech, no focal findings or movement disorder noted  Neck-normal C-spine, no tenderness, full ROM without pain  Feet-no nail deformities or callus formation with good pulses noted      Results for orders placed or performed in visit on 10/26/21   AMB POC LIPID PROFILE   Result Value Ref Range    Cholesterol (POC) 208     Triglycerides (POC) 114     HDL Cholesterol (POC) 50     Non-HDL Cholesterol 159     LDL Cholesterol (POC) 136 MG/DL    TChol/HDL Ratio (POC) 4.2    AMB POC GLUCOSE BLOOD, BY GLUCOSE MONITORING DEVICE   Result Value Ref Range    Glucose  MG/DL   AMB POC HEMOGLOBIN A1C   Result Value Ref Range    Hemoglobin A1c (POC) 7.2 %       Assessment/Plan:    ICD-10-CM ICD-9-CM 1. Type 2 diabetes mellitus with diabetic polyneuropathy, without long-term current use of insulin (HCC)  E11.42 250.60      357.2    2. Hypercholesteremia  E78.00 272.0    3. Essential hypertension, benign  I10 401.1    4. Controlled type 2 diabetes mellitus without complication, without long-term current use of insulin (HCC)  E11.9 250.00      No orders of the defined types were placed in this encounter. lose weight, increase physical activity, follow low fat diet, follow low salt diet, continue present plan, routine labs ordered, call if any problems  There are no Patient Instructions on file for this visit. I have reviewed with the patient details of the assessment and plan and all questions were answered. Relevent patient education was performed    An After Visit Summary was printed and given to the patient.

## 2022-03-01 NOTE — PROGRESS NOTES
Chief Complaint   Patient presents with    Diabetes    Foot Pain    Cough     3 most recent PHQ Screens 3/1/2022   PHQ Not Done -   Little interest or pleasure in doing things Not at all   Feeling down, depressed, irritable, or hopeless Not at all   Total Score PHQ 2 0       1. Have you been to the ER, urgent care clinic since your last visit? Hospitalized since your last visit?no    2. Have you seen or consulted any other health care providers outside of the [address] [place] [address] since your last visit? Include any pap smears or colon screening.  no

## 2022-03-01 NOTE — PATIENT INSTRUCTIONS
[employer] Activation    Thank you for requesting access to [employer]. Please follow the instructions below to securely access and download your online medical record. [employer] allows you to send messages to your doctor, view your test results, renew your prescriptions, schedule appointments, and more. How Do I Sign Up? 1. In your internet browser, go to [email or web address]  2. Click on the First Time User? Click Here link in the Sign In box. You will be redirect to the New Member Sign Up page. 3. Enter your [employer] Access Code exactly as it appears below. You will not need to use this code after youve completed the sign-up process. If you do not sign up before the expiration date, you must request a new code. [employer] Access Code: Activation code not generated  Current [employer] Status: Active (This is the date your [employer] access code will )    4. Enter the last four digits of your Social Security Number (xxxx) and Date of Birth (mm/dd/yyyy) as indicated and click Submit. You will be taken to the next sign-up page. 5. Create a [employer] ID. This will be your [employer] login ID and cannot be changed, so think of one that is secure and easy to remember. 6. Create a [employer] password. You can change your password at any time. 7. Enter your Password Reset Question and Answer. This can be used at a later time if you forget your password. 8. Enter your e-mail address. You will receive e-mail notification when new information is available in [address] 9. Click Sign Up. You can now view and download portions of your medical record. 10. Click the Download Summary menu link to download a portable copy of your medical information. Additional Information    If you have questions, please visit the Frequently Asked Questions section of the [employer] website at [email or web address] Remember, [employer] is NOT to be used for urgent needs. For medical emergencies, dial 911.

## 2022-03-03 LAB
ALBUMIN SERPL-MCNC: 4.8 G/DL (ref 3.8–4.8)
ALBUMIN/GLOB SERPL: 1.7 {RATIO} (ref 1.2–2.2)
ALP SERPL-CCNC: 79 IU/L (ref 44–121)
ALT SERPL-CCNC: 27 IU/L (ref 0–32)
AST SERPL-CCNC: 21 IU/L (ref 0–40)
BILIRUB SERPL-MCNC: 0.3 MG/DL (ref 0–1.2)
BUN SERPL-MCNC: 17 MG/DL (ref 8–27)
BUN/CREAT SERPL: 16 (ref 12–28)
CALCIUM SERPL-MCNC: 10.2 MG/DL (ref 8.7–10.3)
CHLORIDE SERPL-SCNC: 104 MMOL/L (ref 96–106)
CO2 SERPL-SCNC: 20 MMOL/L (ref 20–29)
CREAT SERPL-MCNC: 1.09 MG/DL (ref 0.57–1)
EGFR: 56 ML/MIN/1.73
ERYTHROCYTE [DISTWIDTH] IN BLOOD BY AUTOMATED COUNT: 13.7 % (ref 11.7–15.4)
GLOBULIN SER CALC-MCNC: 2.8 G/DL (ref 1.5–4.5)
GLUCOSE SERPL-MCNC: 165 MG/DL (ref 65–99)
HCT VFR BLD AUTO: 42.2 % (ref 34–46.6)
HGB BLD-MCNC: 13.5 G/DL (ref 11.1–15.9)
MCH RBC QN AUTO: 27.6 PG (ref 26.6–33)
MCHC RBC AUTO-ENTMCNC: 32 G/DL (ref 31.5–35.7)
MCV RBC AUTO: 86 FL (ref 79–97)
PLATELET # BLD AUTO: 552 X10E3/UL (ref 150–450)
POTASSIUM SERPL-SCNC: 5.4 MMOL/L (ref 3.5–5.2)
PROT SERPL-MCNC: 7.6 G/DL (ref 6–8.5)
RBC # BLD AUTO: 4.89 X10E6/UL (ref 3.77–5.28)
SODIUM SERPL-SCNC: 145 MMOL/L (ref 134–144)
WBC # BLD AUTO: 6.7 X10E3/UL (ref 3.4–10.8)

## 2022-03-12 RX ORDER — PAROXETINE 10 MG/1
TABLET, FILM COATED ORAL
Qty: 30 TABLET | Refills: 3 | Status: SHIPPED | OUTPATIENT
Start: 2022-03-12 | End: 2022-06-16 | Stop reason: SDUPTHER

## 2022-03-18 PROBLEM — F33.9 MAJOR DEPRESSIVE DISORDER, RECURRENT, UNSPECIFIED (HCC): Status: ACTIVE | Noted: 2021-10-26

## 2022-03-19 PROBLEM — F33.1 MAJOR DEPRESSIVE DISORDER, RECURRENT, MODERATE (HCC): Status: ACTIVE | Noted: 2021-10-26

## 2022-03-20 PROBLEM — F33.0 MAJOR DEPRESSIVE DISORDER, RECURRENT, MILD (HCC): Status: ACTIVE | Noted: 2021-10-26

## 2022-04-18 DIAGNOSIS — E11.9 CONTROLLED TYPE 2 DIABETES MELLITUS WITHOUT COMPLICATION, WITHOUT LONG-TERM CURRENT USE OF INSULIN (HCC): ICD-10-CM

## 2022-04-19 RX ORDER — PREGABALIN 300 MG/1
CAPSULE ORAL
Qty: 60 CAPSULE | Refills: 0 | Status: SHIPPED | OUTPATIENT
Start: 2022-04-19 | End: 2022-05-24

## 2022-04-20 RX ORDER — FENOFIBRATE 134 MG/1
CAPSULE ORAL
Qty: 90 CAPSULE | Refills: 2 | Status: SHIPPED | OUTPATIENT
Start: 2022-04-20

## 2022-05-23 ENCOUNTER — VIRTUAL VISIT (OUTPATIENT)
Dept: INTERNAL MEDICINE CLINIC | Age: 67
End: 2022-05-23
Payer: MEDICARE

## 2022-05-23 DIAGNOSIS — F33.0 MAJOR DEPRESSIVE DISORDER, RECURRENT, MILD (HCC): Primary | ICD-10-CM

## 2022-05-23 DIAGNOSIS — N30.90 CYSTITIS: ICD-10-CM

## 2022-05-23 DIAGNOSIS — E11.9 CONTROLLED TYPE 2 DIABETES MELLITUS WITHOUT COMPLICATION, WITHOUT LONG-TERM CURRENT USE OF INSULIN (HCC): ICD-10-CM

## 2022-05-23 PROCEDURE — 3017F COLORECTAL CA SCREEN DOC REV: CPT | Performed by: INTERNAL MEDICINE

## 2022-05-23 PROCEDURE — G9899 SCRN MAM PERF RSLTS DOC: HCPCS | Performed by: INTERNAL MEDICINE

## 2022-05-23 PROCEDURE — 1090F PRES/ABSN URINE INCON ASSESS: CPT | Performed by: INTERNAL MEDICINE

## 2022-05-23 PROCEDURE — 1101F PT FALLS ASSESS-DOCD LE1/YR: CPT | Performed by: INTERNAL MEDICINE

## 2022-05-23 PROCEDURE — G8756 NO BP MEASURE DOC: HCPCS | Performed by: INTERNAL MEDICINE

## 2022-05-23 PROCEDURE — 99213 OFFICE O/P EST LOW 20 MIN: CPT | Performed by: INTERNAL MEDICINE

## 2022-05-23 PROCEDURE — G8427 DOCREV CUR MEDS BY ELIG CLIN: HCPCS | Performed by: INTERNAL MEDICINE

## 2022-05-23 PROCEDURE — G8536 NO DOC ELDER MAL SCRN: HCPCS | Performed by: INTERNAL MEDICINE

## 2022-05-23 PROCEDURE — G8399 PT W/DXA RESULTS DOCUMENT: HCPCS | Performed by: INTERNAL MEDICINE

## 2022-05-23 PROCEDURE — G8417 CALC BMI ABV UP PARAM F/U: HCPCS | Performed by: INTERNAL MEDICINE

## 2022-05-23 PROCEDURE — G9717 DOC PT DX DEP/BP F/U NT REQ: HCPCS | Performed by: INTERNAL MEDICINE

## 2022-05-23 RX ORDER — CIPROFLOXACIN 500 MG/1
500 TABLET ORAL 2 TIMES DAILY
Qty: 14 TABLET | Refills: 0 | Status: SHIPPED | OUTPATIENT
Start: 2022-05-23 | End: 2022-08-01 | Stop reason: ALTCHOICE

## 2022-05-23 NOTE — PATIENT INSTRUCTIONS
[employer] Activation    Thank you for requesting access to [employer]. Please follow the instructions below to securely access and download your online medical record. [employer] allows you to send messages to your doctor, view your test results, renew your prescriptions, schedule appointments, and more. How Do I Sign Up? 1. In your internet browser, go to [email or web address]  2. Click on the First Time User? Click Here link in the Sign In box. You will be redirect to the New Member Sign Up page. 3. Enter your [employer] Access Code exactly as it appears below. You will not need to use this code after youve completed the sign-up process. If you do not sign up before the expiration date, you must request a new code. [employer] Access Code: Activation code not generated  Current [employer] Status: Active (This is the date your [employer] access code will )    4. Enter the last four digits of your Social Security Number (xxxx) and Date of Birth (mm/dd/yyyy) as indicated and click Submit. You will be taken to the next sign-up page. 5. Create a [employer] ID. This will be your [employer] login ID and cannot be changed, so think of one that is secure and easy to remember. 6. Create a [employer] password. You can change your password at any time. 7. Enter your Password Reset Question and Answer. This can be used at a later time if you forget your password. 8. Enter your e-mail address. You will receive e-mail notification when new information is available in [address] 9. Click Sign Up. You can now view and download portions of your medical record. 10. Click the Download Summary menu link to download a portable copy of your medical information. Additional Information    If you have questions, please visit the Frequently Asked Questions section of the [employer] website at [email or web address] Remember, [employer] is NOT to be used for urgent needs. For medical emergencies, dial 911.

## 2022-05-23 NOTE — PROGRESS NOTES
Cristóbal Krishnamurthy is a 77 y.o. female being evaluated by a Virtual Visit (video visit) encounter to address concerns as mentioned above. A caregiver was present when appropriate. Due to this being a TeleHealth encounter (During QCPOP-20 public health emergency), evaluation of the following organ systems was limited: Vitals/Constitutional/EENT/Resp/CV/GI//MS/Neuro/Skin/Heme-Lymph-Imm. Pursuant to the emergency declaration under the [address] [place] [address] and the [employer] Act, this Virtual Visit was conducted with patient's (and/or legal guardian's) consent, to reduce the risk of exposure to COVID-19 and provide necessary medical care. Services were provided through a video synchronous discussion virtually to substitute for in-person encounter. --Darinel Chaney MD on 5/23/2022 at 3:46 PM    An electronic signature was used to authenticate this note. Arleth Umana is a 77 y.o. female and presents with Bladder Infection  . Subjective:    Depression Review:  Patient is seen for followup of depression. Ongoing depressed mood, feelings of worthlessness/guilt, difficulty concentrating and hopelessness Treatment includes no medication and no other therapies. She denies recurrent thoughts of death and suicidal thoughts without plan. She experiences the following side effects from the treatment: none. The patient is not followed by psychiatry. Urinary Tract Infection:  Patient complains of dysuria, frequency, urgency, abnormal smelling urine. Onset was a few days ago, gradually worsening since that time. Patient complains of lower abdominal ache. . There is not a history of trauma to the genital area. Patient does not have a history of recurrent UTI. Patient does not have a history of pyelonephritis.       Review of Systems  Constitutional: negative for fevers, chills, anorexia and weight loss  Eyes: negative for visual disturbance and irritation  ENT:   negative for tinnitus,sore throat,nasal congestion,ear pains. hoarseness  Respiratory:  negative for cough, hemoptysis, dyspnea,wheezing  CV:   negative for chest pain, palpitations, lower extremity edema  GI:   negative for nausea, vomiting, diarrhea, abdominal pain,melena  Endo:               negative for polyuria,polydipsia,polyphagia,heat intolerance  Genitourinary: negative for frequency, dysuria and hematuria  Integument:  negative for rash and pruritus  Hematologic:  negative for easy bruising and gum/nose bleeding  Musculoskel: negative for myalgias, arthralgias, back pain, muscle weakness, joint pain  Neurological:  negative for headaches, dizziness, vertigo, memory problems and gait   Behavl/Psych:feelings of anxiety, depression, mood changes    Past Medical History:   Diagnosis Date    Acute cholecystitis 2/9/2011    Acute cystitis 2/9/2011    Cholecystitis 2/9/2011    Cholelithiasis 2/9/2011    Chronic Pain 2/9/2011    Essential hypertension, benign 2/9/2011    GERD (gastroesophageal reflux disease)     GERD, Gastro- Esophageal Reflux Diease (acid reflux) 2/9/2011    Herniated nucleus pulposus 2/9/2011    Hypercholesteremia 2/9/2011    Hypercholesteremia 2/9/2011    Hypertension     Major depressive disorder, recurrent, mild 10/26/2021    Neurogenic bladder, NOS 2/9/2011     Past Surgical History:   Procedure Laterality Date    HX GYN      HX HAMMER TOE REPAIR  6/21/2016    left foot      Social History     Socioeconomic History    Marital status:    Tobacco Use    Smoking status: Never Smoker    Smokeless tobacco: Never Used   Substance and Sexual Activity    Alcohol use: No    Drug use: No    Sexual activity: Yes     Partners: Male     Family History   Problem Relation Age of Onset    Heart Disease Mother     Cancer Father      Current Outpatient Medications   Medication Sig Dispense Refill    ciprofloxacin HCl (CIPRO) 500 mg tablet Take 1 Tablet by mouth two (2) times a day. 14 Tablet 0    pregabalin (LYRICA) 300 mg capsule TAKE 1 CAPSULE BY MOUTH TWICE A DAY 60 Capsule 0    PARoxetine (PAXIL) 10 mg tablet TAKE 1 TABLET BY MOUTH EVERY DAY 30 Tablet 3    metFORMIN (GLUCOPHAGE) 1,000 mg tablet Take 1 Tablet by mouth two (2) times a day. 60 Tablet 12    losartan (COZAAR) 25 mg tablet Take 1 Tablet by mouth daily. 90 Tablet 0    pantoprazole (PROTONIX) 40 mg tablet TAKE 1 TABLET BY MOUTH EVERY DAY 90 Tablet 1    baclofen (LIORESAL) 10 mg tablet Take 1 Tablet by mouth two (2) times a day. 60 Tablet 12    fluticasone propionate (FLONASE) 50 mcg/actuation nasal spray 2 Sprays by Both Nostrils route daily. 1 Bottle 12    celecoxib (CELEBREX) 200 mg capsule TAKE 1 CAPSULE BY MOUTH EVERY DAY 30 Capsule 11    SITagliptin (Januvia) 100 mg tablet TAKE 1 TABLET BY MOUTH EVERY DAY 30 Tablet 11    clotrimazole-betamethasone (LOTRISONE) topical cream Apply  to affected area two (2) times a day. 45 g 3    cetirizine (ZYRTEC) 10 mg tablet Take 1 Tab by mouth daily. 30 Tab 3    aspirin 81 mg tablet Take  by mouth.  fenofibrate micronized (LOFIBRA) 134 mg capsule TAKE 1 CAPSULE BY MOUTH EVERY DAY 90 Capsule 2     Allergies   Allergen Reactions    Diclofenac Itching       Objective: There were no vitals taken for this visit. Physical Exam:   General appearance - alert, well appearing, and in no distress  Mental status - alert, oriented to person, place, and time  EYE-LAURI, EOMI, corneas normal, no foreign bodies  ENT-ENT exam normal, no neck nodes or sinus tenderness  Nose - normal and patent, no erythema, discharge or polyps  Mouth - mucous membranes moist, pharynx normal without lesions  Skin-Warm and dry.  no hyperpigmentation, vitiligo, or suspicious lesions  Neuro -alert, oriented, normal speech, no focal findings or movement disorder noted  Neck-normal C-spine, no tenderness, full ROM without pain        Results for orders placed or performed in visit on 21/96/99   METABOLIC PANEL, COMPREHENSIVE   Result Value Ref Range    Glucose 165 (H) 65 - 99 mg/dL    BUN 17 8 - 27 mg/dL    Creatinine 1.09 (H) 0.57 - 1.00 mg/dL    eGFR 56 (L) >59 mL/min/1.73    BUN/Creatinine ratio 16 12 - 28    Sodium 145 (H) 134 - 144 mmol/L    Potassium 5.4 (H) 3.5 - 5.2 mmol/L    Chloride 104 96 - 106 mmol/L    CO2 20 20 - 29 mmol/L    Calcium 10.2 8.7 - 10.3 mg/dL    Protein, total 7.6 6.0 - 8.5 g/dL    Albumin 4.8 3.8 - 4.8 g/dL    GLOBULIN, TOTAL 2.8 1.5 - 4.5 g/dL    A-G Ratio 1.7 1.2 - 2.2    Bilirubin, total 0.3 0.0 - 1.2 mg/dL    Alk. phosphatase 79 44 - 121 IU/L    AST (SGOT) 21 0 - 40 IU/L    ALT (SGPT) 27 0 - 32 IU/L   CBC W/O DIFF   Result Value Ref Range    WBC 6.7 3.4 - 10.8 x10E3/uL    RBC 4.89 3.77 - 5.28 x10E6/uL    HGB 13.5 11.1 - 15.9 g/dL    HCT 42.2 34.0 - 46.6 %    MCV 86 79 - 97 fL    MCH 27.6 26.6 - 33.0 pg    MCHC 32.0 31.5 - 35.7 g/dL    RDW 13.7 11.7 - 15.4 %    PLATELET 795 (H) 631 - 450 x10E3/uL   AMB POC GLUCOSE BLOOD, BY GLUCOSE MONITORING DEVICE   Result Value Ref Range    Glucose  MG/DL   AMB POC HEMOGLOBIN A1C   Result Value Ref Range    Hemoglobin A1c (POC) 7.2 %   AMB POC LIPID PROFILE   Result Value Ref Range    Cholesterol (POC) 245     Triglycerides (POC) 234     HDL Cholesterol (POC) 49     LDL Cholesterol (POC) 149 MG/DL    Non-HDL Goal (POC) 196     TChol/HDL Ratio (POC) 5.0        Assessment/Plan:    ICD-10-CM ICD-9-CM    1. Major depressive disorder, recurrent, mild  F33.0 296.31    2. Cystitis  N30.90 595.9      Orders Placed This Encounter    ciprofloxacin HCl (CIPRO) 500 mg tablet     Sig: Take 1 Tablet by mouth two (2) times a day. Dispense:  14 Tablet     Refill:  0     lose weight, follow low fat diet, follow low salt diet,Take 81mg aspirin daily  There are no Patient Instructions on file for this visit.          I have reviewed with the patient details of the assessment and plan and all questions were answered. Relevent patient education was performed. The most recent lab findings were reviewed with the patient. An After Visit Summary was printed and given to the patient.

## 2022-05-24 RX ORDER — PREGABALIN 300 MG/1
CAPSULE ORAL
Qty: 60 CAPSULE | Refills: 0 | Status: SHIPPED | OUTPATIENT
Start: 2022-05-24 | End: 2022-07-18

## 2022-06-16 NOTE — TELEPHONE ENCOUNTER
A 90 day supply requested by [facility]. If appropriate, please sign pended medication. Last visit 05/23/2002 Virtual visit MD [place] Sat   Next appointment No show 06/10/2022 - Nothing rescheduled   Previous refill encounter(s)   03/12/2022 Paxil #30 with 3 refills. For Pharmacy Admin Tracking Only     Intervention Detail: New Rx: 1, reason: Improve Adherence   Time Spent (min): 5        Requested Prescriptions     Pending Prescriptions Disp Refills    PARoxetine (PAXIL) 10 mg tablet 90 Tablet 0     Sig: Take 1 Tablet by mouth daily.

## 2022-06-17 RX ORDER — PAROXETINE 10 MG/1
10 TABLET, FILM COATED ORAL DAILY
Qty: 90 TABLET | Refills: 0 | Status: SHIPPED | OUTPATIENT
Start: 2022-06-17 | End: 2022-08-30

## 2022-07-15 DIAGNOSIS — E11.9 CONTROLLED TYPE 2 DIABETES MELLITUS WITHOUT COMPLICATION, WITHOUT LONG-TERM CURRENT USE OF INSULIN (HCC): ICD-10-CM

## 2022-07-18 RX ORDER — PREGABALIN 300 MG/1
CAPSULE ORAL
Qty: 60 CAPSULE | Refills: 0 | Status: SHIPPED | OUTPATIENT
Start: 2022-07-18 | End: 2022-08-19

## 2022-08-01 ENCOUNTER — OFFICE VISIT (OUTPATIENT)
Dept: INTERNAL MEDICINE CLINIC | Age: 67
End: 2022-08-01
Payer: MEDICARE

## 2022-08-01 VITALS
SYSTOLIC BLOOD PRESSURE: 130 MMHG | HEIGHT: 61 IN | OXYGEN SATURATION: 97 % | WEIGHT: 160 LBS | DIASTOLIC BLOOD PRESSURE: 70 MMHG | HEART RATE: 67 BPM | TEMPERATURE: 98 F | RESPIRATION RATE: 18 BRPM | BODY MASS INDEX: 30.21 KG/M2

## 2022-08-01 DIAGNOSIS — F33.0 MAJOR DEPRESSIVE DISORDER, RECURRENT, MILD (HCC): ICD-10-CM

## 2022-08-01 DIAGNOSIS — E78.00 HYPERCHOLESTEREMIA: ICD-10-CM

## 2022-08-01 DIAGNOSIS — T46.5X5A: ICD-10-CM

## 2022-08-01 DIAGNOSIS — I10 ESSENTIAL HYPERTENSION, BENIGN: ICD-10-CM

## 2022-08-01 DIAGNOSIS — E11.9 CONTROLLED TYPE 2 DIABETES MELLITUS WITHOUT COMPLICATION, WITHOUT LONG-TERM CURRENT USE OF INSULIN (HCC): Primary | ICD-10-CM

## 2022-08-01 PROBLEM — E11.22 TYPE 2 DIABETES MELLITUS WITH CHRONIC KIDNEY DISEASE (HCC): Status: ACTIVE | Noted: 2022-08-01

## 2022-08-01 LAB
CHOLEST SERPL-MCNC: 197 MG/DL
GLUCOSE POC: 176 MG/DL
HBA1C MFR BLD HPLC: 6.9 %
HDLC SERPL-MCNC: 62 MG/DL
LDL CHOLESTEROL POC: 119 MG/DL
NON-HDL GOAL (POC): 135
TCHOL/HDL RATIO (POC): 3.2
TRIGL SERPL-MCNC: 79 MG/DL

## 2022-08-01 PROCEDURE — G8399 PT W/DXA RESULTS DOCUMENT: HCPCS | Performed by: INTERNAL MEDICINE

## 2022-08-01 PROCEDURE — 3044F HG A1C LEVEL LT 7.0%: CPT | Performed by: INTERNAL MEDICINE

## 2022-08-01 PROCEDURE — G9717 DOC PT DX DEP/BP F/U NT REQ: HCPCS | Performed by: INTERNAL MEDICINE

## 2022-08-01 PROCEDURE — 83036 HEMOGLOBIN GLYCOSYLATED A1C: CPT | Performed by: INTERNAL MEDICINE

## 2022-08-01 PROCEDURE — 82962 GLUCOSE BLOOD TEST: CPT | Performed by: INTERNAL MEDICINE

## 2022-08-01 PROCEDURE — G8417 CALC BMI ABV UP PARAM F/U: HCPCS | Performed by: INTERNAL MEDICINE

## 2022-08-01 PROCEDURE — 1101F PT FALLS ASSESS-DOCD LE1/YR: CPT | Performed by: INTERNAL MEDICINE

## 2022-08-01 PROCEDURE — 80061 LIPID PANEL: CPT | Performed by: INTERNAL MEDICINE

## 2022-08-01 PROCEDURE — G8752 SYS BP LESS 140: HCPCS | Performed by: INTERNAL MEDICINE

## 2022-08-01 PROCEDURE — G8536 NO DOC ELDER MAL SCRN: HCPCS | Performed by: INTERNAL MEDICINE

## 2022-08-01 PROCEDURE — 1090F PRES/ABSN URINE INCON ASSESS: CPT | Performed by: INTERNAL MEDICINE

## 2022-08-01 PROCEDURE — 2022F DILAT RTA XM EVC RTNOPTHY: CPT | Performed by: INTERNAL MEDICINE

## 2022-08-01 PROCEDURE — G8754 DIAS BP LESS 90: HCPCS | Performed by: INTERNAL MEDICINE

## 2022-08-01 PROCEDURE — 3017F COLORECTAL CA SCREEN DOC REV: CPT | Performed by: INTERNAL MEDICINE

## 2022-08-01 PROCEDURE — G9899 SCRN MAM PERF RSLTS DOC: HCPCS | Performed by: INTERNAL MEDICINE

## 2022-08-01 PROCEDURE — G8427 DOCREV CUR MEDS BY ELIG CLIN: HCPCS | Performed by: INTERNAL MEDICINE

## 2022-08-01 PROCEDURE — 99214 OFFICE O/P EST MOD 30 MIN: CPT | Performed by: INTERNAL MEDICINE

## 2022-08-01 NOTE — PATIENT INSTRUCTIONS
[employer] Activation    Thank you for requesting access to [facility]. Please follow the instructions below to securely access and download your online medical record. [facility] allows you to send messages to your doctor, view your test results, renew your prescriptions, schedule appointments, and more. How Do I Sign Up? In your internet browser, go to www.[employer]  Click on the First Time User? Click Here link in the Sign In box. You will be redirect to the New Member Sign Up page. Enter your [facility] Access Code exactly as it appears below. You will not need to use this code after youve completed the sign-up process. If you do not sign up before the expiration date, you must request a new code. [facility] Access Code: Activation code not generated  Current [facility] Status: Active (This is the date your [facility] access code will )    Enter the last four digits of your Social Security Number (xxxx) and Date of Birth (mm/dd/yyyy) as indicated and click Submit. You will be taken to the next sign-up page. Create a [facility] ID. This will be your [facility] login ID and cannot be changed, so think of one that is secure and easy to remember. Create a [facility] password. You can change your password at any time. Enter your Password Reset Question and Answer. This can be used at a later time if you forget your password. Enter your e-mail address. You will receive e-mail notification when new information is available in [address] Click Sign Up. You can now view and download portions of your medical record. Click the [place] link to download a portable copy of your medical information. Additional Information    If you have questions, please visit the Frequently Asked Questions section of the [employer] website at [email or web address] Remember, [employer] is NOT to be used for urgent needs. For medical emergencies, dial 911.

## 2022-08-01 NOTE — PROGRESS NOTES
Tu Cortes is a 77 y.o. female and presents with Diabetes and Anxiety  . Subjective:    Hypertension Review:  The patient has essential hypertension  Diet and Lifestyle: generally follows a  low sodium diet, exercises sporadically  Home BP Monitoring: is not measured at home. Pertinent ROS: taking medications as instructed, coughing  medication side effects noted from Losartan ,no TIA's, no chest pain on exertion, no dyspnea on exertion, no swelling of ankles. Anxiety review:  Patient complains of feelings of losing control, difficulty concentrating  Treatment includes Paxil and no other therapies. She denies recurrent thoughts of death and suicidal thoughts without plan. She experiences the following side effects from the treatment: none. Diabetes Mellitus Review:  She has diabetes mellitus. Diabetic ROS - medication compliance: compliant reported, diabetic diet compliance: reported, home glucose monitoring: is performed at times. Known diabetic complications: none  Cardiovascular risk factors: family history, dyslipidemia, diabetes mellitus, obesity, hypertension  Current diabetic medications include oral agents/insulin   Eye exam current (within one year): unknown  Weight trend: labile  Prior visit with dietician: no  Current diet:unknown  Current exercise: walking on occasion  Current monitoring regimen: home blood tests - daily/none  Home blood sugar records: trend: stable/  Any episodes of hypoglycemia? no      Review of Systems  Constitutional: negative for fevers, chills, anorexia and weight loss  Eyes:   negative for visual disturbance and irritation  ENT:   negative for tinnitus,sore throat,nasal congestion,ear pains. hoarseness  Respiratory:  negative for cough, hemoptysis, dyspnea,wheezing  CV:   negative for chest pain, palpitations, lower extremity edema  GI:   negative for nausea, vomiting, diarrhea, abdominal pain,melena  Endo:               negative for polyuria,polydipsia,polyphagia,heat intolerance  Genitourinary: negative for frequency, dysuria and hematuria  Integument:  negative for rash and pruritus  Hematologic:  negative for easy bruising and gum/nose bleeding  Musculoskel: myalgias, arthralgias, back pain, muscle spasms joint pain  Neurological:  negative for headaches, dizziness, vertigo, memory problems and gait   Behavl/Psych:  feelings of anxiety, depression, mood changes    Past Medical History:   Diagnosis Date    Acute cholecystitis 2/9/2011    Acute cystitis 2/9/2011    Cholecystitis 2/9/2011    Cholelithiasis 2/9/2011    Chronic Pain 2/9/2011    Essential hypertension, benign 2/9/2011    GERD (gastroesophageal reflux disease)     GERD, Gastro- Esophageal Reflux Diease (acid reflux) 2/9/2011    Herniated nucleus pulposus 2/9/2011    Hypercholesteremia 2/9/2011    Hypercholesteremia 2/9/2011    Hypertension     Major depressive disorder, recurrent, mild 10/26/2021    Neurogenic bladder, NOS 2/9/2011    [facility] 8/1/2022     Past Surgical History:   Procedure Laterality Date    HX GYN      HX HAMMER TOE REPAIR  6/21/2016    left foot      Social History     Socioeconomic History    Marital status:    Tobacco Use    Smoking status: Never    Smokeless tobacco: Never   Substance and Sexual Activity    Alcohol use: No    Drug use: No    Sexual activity: Yes     Partners: Male     Family History   Problem Relation Age of Onset    Heart Disease Mother     Cancer Father      Current Outpatient Medications   Medication Sig Dispense Refill    pregabalin (LYRICA) 300 mg capsule TAKE 1 CAPSULE BY MOUTH TWICE A DAY 60 Capsule 0    PARoxetine (PAXIL) 10 mg tablet Take 1 Tablet by mouth daily. 90 Tablet 0    fenofibrate micronized (LOFIBRA) 134 mg capsule TAKE 1 CAPSULE BY MOUTH EVERY DAY 90 Capsule 2    metFORMIN (GLUCOPHAGE) 1,000 mg tablet Take 1 Tablet by mouth two (2) times a day.  60 Tablet 12 pantoprazole (PROTONIX) 40 mg tablet TAKE 1 TABLET BY MOUTH EVERY DAY 90 Tablet 1    baclofen (LIORESAL) 10 mg tablet Take 1 Tablet by mouth two (2) times a day. 60 Tablet 12    fluticasone propionate (FLONASE) 50 mcg/actuation nasal spray 2 Sprays by Both Nostrils route daily. 1 Bottle 12    celecoxib (CELEBREX) 200 mg capsule TAKE 1 CAPSULE BY MOUTH EVERY DAY 30 Capsule 11    SITagliptin (Januvia) 100 mg tablet TAKE 1 TABLET BY MOUTH EVERY DAY 30 Tablet 11    clotrimazole-betamethasone (LOTRISONE) topical cream Apply  to affected area two (2) times a day. 45 g 3    cetirizine (ZYRTEC) 10 mg tablet Take 1 Tab by mouth daily. 30 Tab 3    aspirin 81 mg tablet Take  by mouth. Allergies   Allergen Reactions    Diclofenac Itching       Objective:  Visit Vitals  /70 (BP 1 Location: Right arm, BP Patient Position: Sitting, BP Cuff Size: Adult)   Pulse 67   Temp 98 °F (36.7 °C) (Oral)   Resp 18   Ht 5' 1\" (1.549 m)   Wt 160 lb (72.6 kg)   SpO2 97%   BMI 30.23 kg/m²     Physical Exam:   General appearance - alert, well appearing, and in no distress  Mental status - alert, oriented to person, place, and time  EYE-LAURI, EOMI, corneas normal, no foreign bodies  ENT-ENT exam normal, no neck nodes or sinus tenderness  Nose - normal and patent, no erythema, discharge or polyps  Mouth - mucous membranes moist, pharynx normal without lesions  Neck - supple, no significant adenopathy   Chest - clear to auscultation, no wheezes, rales or rhonchi, symmetric air entry   Heart - normal rate, regular rhythm, normal S1, S2, no murmurs, rubs, clicks or gallops   Abdomen - soft, nontender, nondistended, no masses or organomegaly  Lymph- no adenopathy palpable  Ext-peripheral pulses normal, no pedal edema, no clubbing or cyanosis  Skin-Warm and dry.  no hyperpigmentation, vitiligo, or suspicious lesions  Neuro -alert, oriented, normal speech, no focal findings or movement disorder noted  Neck-normal C-spine, no tenderness, full ROM without pain  Feet-no nail deformities or callus formation with good pulses noted      Results for orders placed or performed in visit on 95/30/33   METABOLIC PANEL, COMPREHENSIVE   Result Value Ref Range    Glucose 165 (H) 65 - 99 mg/dL    BUN 17 8 - 27 mg/dL    Creatinine 1.09 (H) 0.57 - 1.00 mg/dL    eGFR 56 (L) >59 mL/min/1.73    BUN/Creatinine ratio 16 12 - 28    Sodium 145 (H) 134 - 144 mmol/L    Potassium 5.4 (H) 3.5 - 5.2 mmol/L    Chloride 104 96 - 106 mmol/L    CO2 20 20 - 29 mmol/L    Calcium 10.2 8.7 - 10.3 mg/dL    Protein, total 7.6 6.0 - 8.5 g/dL    Albumin 4.8 3.8 - 4.8 g/dL    GLOBULIN, TOTAL 2.8 1.5 - 4.5 g/dL    A-G Ratio 1.7 1.2 - 2.2    Bilirubin, total 0.3 0.0 - 1.2 mg/dL    Alk. phosphatase 79 44 - 121 IU/L    AST (SGOT) 21 0 - 40 IU/L    ALT (SGPT) 27 0 - 32 IU/L   CBC W/O DIFF   Result Value Ref Range    WBC 6.7 3.4 - 10.8 x10E3/uL    RBC 4.89 3.77 - 5.28 x10E6/uL    HGB 13.5 11.1 - 15.9 g/dL    HCT 42.2 34.0 - 46.6 %    MCV 86 79 - 97 fL    MCH 27.6 26.6 - 33.0 pg    MCHC 32.0 31.5 - 35.7 g/dL    RDW 13.7 11.7 - 15.4 %    PLATELET 719 (H) 930 - 450 x10E3/uL   AMB POC GLUCOSE BLOOD, BY GLUCOSE MONITORING DEVICE   Result Value Ref Range    Glucose  MG/DL   AMB POC HEMOGLOBIN A1C   Result Value Ref Range    Hemoglobin A1c (POC) 7.2 %   AMB POC LIPID PROFILE   Result Value Ref Range    Cholesterol (POC) 245     Triglycerides (POC) 234     HDL Cholesterol (POC) 49     LDL Cholesterol (POC) 149 MG/DL    Non-HDL Goal (POC) 196     TChol/HDL Ratio (POC) 5.0        Assessment/Plan:    ICD-10-CM ICD-9-CM    1. Controlled type 2 diabetes mellitus without complication, without long-term current use of insulin (HCC)  E11.9 250.00 AMB POC HEMOGLOBIN A1C      AMB POC GLUCOSE BLOOD, BY GLUCOSE MONITORING DEVICE      2. Major depressive disorder, recurrent, mild ([facility])  F33.0 296.31       3. Hypercholesteremia  E78.00 272.0 AMB POC LIPID PROFILE      4.  Essential hypertension, benign  I10 401.1 CBC W/O DIFF      METABOLIC PANEL, COMPREHENSIVE      CBC W/O DIFF      METABOLIC PANEL, COMPREHENSIVE      5. Adverse effect of losartan, initial encounter  T46.5X5A E942.6         Orders Placed This Encounter    CBC W/O DIFF     Standing Status:   Future     Number of Occurrences:   1     Standing Expiration Date:   1316    METABOLIC PANEL, COMPREHENSIVE     Standing Status:   Future     Number of Occurrences:   1     Standing Expiration Date:   2023    AMB POC HEMOGLOBIN A1C    AMB POC GLUCOSE BLOOD, BY GLUCOSE MONITORING DEVICE    AMB POC LIPID PROFILE       lose weight, increase physical activity, follow low fat diet, follow low salt diet, call if any problems,Take 81mg aspirin daily  Patient Instructions   MyChart Activation    Thank you for requesting access to [employer]. Please follow the instructions below to securely access and download your online medical record. [employer] allows you to send messages to your doctor, view your test results, renew your prescriptions, schedule appointments, and more. How Do I Sign Up? In your internet browser, go to [email or web address]  Click on the First Time User? Click Here link in the Sign In box. You will be redirect to the New Member Sign Up page. Enter your Iotum Access Code exactly as it appears below. You will not need to use this code after youve completed the sign-up process. If you do not sign up before the expiration date, you must request a new code. Iotum Access Code: Activation code not generated  Current Iotum Status: Active (This is the date your Iotum access code will )    Enter the last four digits of your Social Security Number (xxxx) and Date of Birth (mm/dd/yyyy) as indicated and click Submit. You will be taken to the next sign-up page. Create a Iotum ID. This will be your Iotum login ID and cannot be changed, so think of one that is secure and easy to remember. Create a Iotum password.  You can change your password at any time. Enter your Password Reset Question and Answer. This can be used at a later time if you forget your password. Enter your e-mail address. You will receive e-mail notification when new information is available in [address] Click Sign Up. You can now view and download portions of your medical record. Click the [employer] link to download a portable copy of your medical information. Additional Information    If you have questions, please visit the Frequently Asked Questions section of the [employer] website at [email or web address] Remember, [employer] is NOT to be used for urgent needs. For medical emergencies, dial 911. Follow-up and Dispositions    Return in about 3 months (around 11/1/2022), or if symptoms worsen or fail to improve. I have reviewed with the patient details of the assessment and plan and all questions were answered. Relevent patient education was performed. The most recent lab findings were reviewed with the patient. An After Visit Summary was printed and given to the patient.

## 2022-08-10 DIAGNOSIS — G51.9 FACIAL NEUROPATHY: ICD-10-CM

## 2022-08-19 DIAGNOSIS — E11.9 CONTROLLED TYPE 2 DIABETES MELLITUS WITHOUT COMPLICATION, WITHOUT LONG-TERM CURRENT USE OF INSULIN (HCC): ICD-10-CM

## 2022-08-19 RX ORDER — PREGABALIN 300 MG/1
CAPSULE ORAL
Qty: 60 CAPSULE | Refills: 0 | Status: SHIPPED | OUTPATIENT
Start: 2022-08-19 | End: 2022-09-30

## 2022-08-30 RX ORDER — FLUTICASONE PROPIONATE 50 MCG
SPRAY, SUSPENSION (ML) NASAL
Qty: 1 EACH | Refills: 4 | Status: SHIPPED | OUTPATIENT
Start: 2022-08-30

## 2022-08-30 RX ORDER — PAROXETINE 10 MG/1
TABLET, FILM COATED ORAL
Qty: 90 TABLET | Refills: 0 | Status: SHIPPED | OUTPATIENT
Start: 2022-08-30

## 2022-09-10 RX ORDER — CELECOXIB 200 MG/1
CAPSULE ORAL
Qty: 30 CAPSULE | Refills: 11 | Status: SHIPPED | OUTPATIENT
Start: 2022-09-10

## 2022-09-29 DIAGNOSIS — E11.9 CONTROLLED TYPE 2 DIABETES MELLITUS WITHOUT COMPLICATION, WITHOUT LONG-TERM CURRENT USE OF INSULIN (HCC): ICD-10-CM

## 2022-09-30 RX ORDER — PREGABALIN 300 MG/1
CAPSULE ORAL
Qty: 60 CAPSULE | Refills: 0 | Status: SHIPPED | OUTPATIENT
Start: 2022-09-30

## 2022-11-03 DIAGNOSIS — E11.9 CONTROLLED TYPE 2 DIABETES MELLITUS WITHOUT COMPLICATION, WITHOUT LONG-TERM CURRENT USE OF INSULIN (HCC): ICD-10-CM

## 2022-11-05 RX ORDER — PREGABALIN 300 MG/1
CAPSULE ORAL
Qty: 60 CAPSULE | Refills: 0 | Status: SHIPPED | OUTPATIENT
Start: 2022-11-05

## 2022-12-05 RX ORDER — PAROXETINE 10 MG/1
TABLET, FILM COATED ORAL
Qty: 90 TABLET | Refills: 0 | Status: SHIPPED | OUTPATIENT
Start: 2022-12-05

## 2022-12-05 RX ORDER — FLUTICASONE PROPIONATE 50 MCG
SPRAY, SUSPENSION (ML) NASAL
Qty: 1 EACH | Refills: 12 | Status: SHIPPED | OUTPATIENT
Start: 2022-12-05

## 2022-12-16 RX ORDER — PANTOPRAZOLE SODIUM 40 MG/1
TABLET, DELAYED RELEASE ORAL
Qty: 90 TABLET | Refills: 1 | Status: SHIPPED | OUTPATIENT
Start: 2022-12-16

## 2023-01-11 RX ORDER — FENOFIBRATE 134 MG/1
CAPSULE ORAL
Qty: 90 CAPSULE | Refills: 2 | Status: SHIPPED | OUTPATIENT
Start: 2023-01-11

## 2023-02-23 ENCOUNTER — OFFICE VISIT (OUTPATIENT)
Dept: INTERNAL MEDICINE CLINIC | Age: 68
End: 2023-02-23
Payer: MEDICARE

## 2023-02-23 VITALS
TEMPERATURE: 98.1 F | WEIGHT: 171 LBS | DIASTOLIC BLOOD PRESSURE: 80 MMHG | RESPIRATION RATE: 18 BRPM | HEIGHT: 61 IN | HEART RATE: 90 BPM | BODY MASS INDEX: 32.28 KG/M2 | OXYGEN SATURATION: 98 % | SYSTOLIC BLOOD PRESSURE: 138 MMHG

## 2023-02-23 DIAGNOSIS — E78.00 HYPERCHOLESTEREMIA: Primary | ICD-10-CM

## 2023-02-23 DIAGNOSIS — I10 ESSENTIAL HYPERTENSION, BENIGN: ICD-10-CM

## 2023-02-23 DIAGNOSIS — N95.1 HOT FLASHES DUE TO MENOPAUSE: ICD-10-CM

## 2023-02-23 DIAGNOSIS — M48.07 SPINAL STENOSIS OF LUMBOSACRAL REGION: ICD-10-CM

## 2023-02-23 DIAGNOSIS — E11.9 CONTROLLED TYPE 2 DIABETES MELLITUS WITHOUT COMPLICATION, WITHOUT LONG-TERM CURRENT USE OF INSULIN (HCC): ICD-10-CM

## 2023-02-23 LAB
CHOLEST SERPL-MCNC: 247 MG/DL
GLUCOSE POC: 166 MG/DL
HBA1C MFR BLD HPLC: 7.8 %
HDLC SERPL-MCNC: 43 MG/DL
LDL CHOLESTEROL POC: 180 MG/DL
NON-HDL GOAL (POC): 205
TCHOL/HDL RATIO (POC): 5.8
TRIGL SERPL-MCNC: 125 MG/DL

## 2023-02-23 PROCEDURE — G8427 DOCREV CUR MEDS BY ELIG CLIN: HCPCS | Performed by: INTERNAL MEDICINE

## 2023-02-23 PROCEDURE — 83036 HEMOGLOBIN GLYCOSYLATED A1C: CPT | Performed by: INTERNAL MEDICINE

## 2023-02-23 PROCEDURE — 2022F DILAT RTA XM EVC RTNOPTHY: CPT | Performed by: INTERNAL MEDICINE

## 2023-02-23 PROCEDURE — 99214 OFFICE O/P EST MOD 30 MIN: CPT | Performed by: INTERNAL MEDICINE

## 2023-02-23 PROCEDURE — 80061 LIPID PANEL: CPT | Performed by: INTERNAL MEDICINE

## 2023-02-23 PROCEDURE — 3046F HEMOGLOBIN A1C LEVEL >9.0%: CPT | Performed by: INTERNAL MEDICINE

## 2023-02-23 PROCEDURE — G9717 DOC PT DX DEP/BP F/U NT REQ: HCPCS | Performed by: INTERNAL MEDICINE

## 2023-02-23 PROCEDURE — 3017F COLORECTAL CA SCREEN DOC REV: CPT | Performed by: INTERNAL MEDICINE

## 2023-02-23 PROCEDURE — G8399 PT W/DXA RESULTS DOCUMENT: HCPCS | Performed by: INTERNAL MEDICINE

## 2023-02-23 PROCEDURE — 1090F PRES/ABSN URINE INCON ASSESS: CPT | Performed by: INTERNAL MEDICINE

## 2023-02-23 PROCEDURE — 1101F PT FALLS ASSESS-DOCD LE1/YR: CPT | Performed by: INTERNAL MEDICINE

## 2023-02-23 PROCEDURE — G8536 NO DOC ELDER MAL SCRN: HCPCS | Performed by: INTERNAL MEDICINE

## 2023-02-23 PROCEDURE — G8417 CALC BMI ABV UP PARAM F/U: HCPCS | Performed by: INTERNAL MEDICINE

## 2023-02-23 PROCEDURE — 82962 GLUCOSE BLOOD TEST: CPT | Performed by: INTERNAL MEDICINE

## 2023-02-23 RX ORDER — PREGABALIN 300 MG/1
300 CAPSULE ORAL 2 TIMES DAILY
Qty: 60 CAPSULE | Refills: 0 | Status: SHIPPED | OUTPATIENT
Start: 2023-02-23

## 2023-02-23 RX ORDER — ROSUVASTATIN CALCIUM 20 MG/1
20 TABLET, COATED ORAL
Qty: 30 TABLET | Refills: 12 | Status: SHIPPED | OUTPATIENT
Start: 2023-02-23

## 2023-02-23 NOTE — PATIENT INSTRUCTIONS
[employer] Activation    Thank you for requesting access to Zaz[employer]. Please follow the instructions below to securely access and download your online medical record. Zazengo allows you to send messages to your doctor, view your test results, renew your prescriptions, schedule appointments, and more. How Do I Sign Up? In your internet browser, go to [email or web address]  Click on the First Time User? Click Here link in the Sign In box. You will be redirect to the New Member Sign Up page. Enter your Zazengo Access Code exactly as it appears below. You will not need to use this code after youve completed the sign-up process. If you do not sign up before the expiration date, you must request a new code. Zazengo Access Code: Activation code not generated  Current Zazengo Status: Active (This is the date your Zazengo access code will )    Enter the last four digits of your Social Security Number (xxxx) and Date of Birth (mm/dd/yyyy) as indicated and click Submit. You will be taken to the next sign-up page. Create a Zazengo ID. This will be your Zazengo login ID and cannot be changed, so think of one that is secure and easy to remember. Create a Zazengo password. You can change your password at any time. Enter your Password Reset Question and Answer. This can be used at a later time if you forget your password. Enter your e-mail address. You will receive e-mail notification when new information is available in [address] Click Sign Up. You can now view and download portions of your medical record. Click the [place] link to download a portable copy of your medical information. Additional Information    If you have questions, please visit the Frequently Asked Questions section of the [employer] website at [email or web address] Remember, [employer] is NOT to be used for urgent needs. For medical emergencies, dial 911.

## 2023-02-23 NOTE — PROGRESS NOTES
Sabina Garcia is a 79 y.o. female and presents with Back Pain, Fatigue, and Diabetes  . Subjective:    Back Pain Review:  Patient presents for evaluation of low back problems. Symptoms have been present for  weeks and include pain in lower back (dull, moderate in character; 8/10 in severity). Initial inciting event: unknown. Symptoms are worst: at times. Alleviating factors identifiable by patient are lying flat, medication . Exacerbating factors identifiable by patient are bending forwards, bending backwards. Treatments so far initiated by patient: medication Previous lower back problems: reported. Previous workup: noted. Diabetes Mellitus Review:  She has diabetes mellitus. Diabetic ROS - medication compliance: compliant all of the time, diabetic diet compliance: compliant all of the time, home glucose monitoring: is performed. Known diabetic complications: none  Cardiovascular risk factors: family history, dyslipidemia, diabetes mellitus, obesity, hypertension  Current diabetic medications include oral agents/   Eye exam current (within one year): no  Weight trend: stable  Prior visit with dietician: no  Current diet: \"healthy\" diet  in general  Current exercise: walking  Current monitoring regimen: home blood tests - daily  Home blood sugar records: trend: stable  Any episodes of hypoglycemia? no  Is She on ACE inhibitor or angiotensin II receptor blocker? Yes     She states she has had hot flashes recently    Allergic Rhinitis  Patient presents for evaluation of allergic symptoms. Symptoms include nasal congestion, rhinorrhea, sneezing, eye itching, watery eyes. Precipitants haved included possible pollen.           Health maintenance suggests the needs for a mammogram      Review of Systems  Constitutional: negative for fevers, chills, anorexia and weight loss  Eyes:   negative for visual disturbance and irritation  ENT:   negative for tinnitus,sore throat,nasal congestion,ear pains.hoarseness  Respiratory:  negative for cough, hemoptysis, dyspnea,wheezing  CV:   negative for chest pain, palpitations, lower extremity edema  GI:   negative for nausea, vomiting, diarrhea, abdominal pain,melena  Endo:               negative for polyuria,polydipsia,polyphagia,heat intolerance  Genitourinary: negative for frequency, dysuria and hematuria  Integument:  negative for rash and pruritus  Hematologic:  negative for easy bruising and gum/nose bleeding  Musculoskel:  myalgias, arthralgias, back pain, oint pain  Neurological:  negative for headaches, dizziness, vertigo, memory problems and gait   Behavl/Psych: negative for feelings of anxiety, depression, mood changes    Past Medical History:   Diagnosis Date    Acute cholecystitis 2/9/2011    Acute cystitis 2/9/2011    Cholecystitis 2/9/2011    Cholelithiasis 2/9/2011    Chronic Pain 2/9/2011    Essential hypertension, benign 2/9/2011    GERD (gastroesophageal reflux disease)     GERD, Gastro- Esophageal Reflux Diease (acid reflux) 2/9/2011    Herniated nucleus pulposus 2/9/2011    Hypercholesteremia 2/9/2011    Hypercholesteremia 2/9/2011    Hypertension     Major depressive disorder, recurrent, mild 10/26/2021    Neurogenic bladder, NOS 2/9/2011    Type 2 diabetes mellitus with chronic kidney disease ([facility] 8/1/2022     Past Surgical History:   Procedure Laterality Date    HX GYN      HX HAMMER TOE REPAIR  6/21/2016    left foot      Social History     Socioeconomic History    Marital status:    Tobacco Use    Smoking status: Never    Smokeless tobacco: Never   Substance and Sexual Activity    Alcohol use: No    Drug use: No    Sexual activity: Yes     Partners: Male     Family History   Problem Relation Age of Onset    Heart Disease Mother     Cancer Father      Current Outpatient Medications   Medication Sig Dispense Refill    pregabalin (LYRICA) 300 mg capsule Take 1 Capsule by mouth two (2) times a day.  60 Capsule 0    fenofibrate micronized (LOFIBRA) 134 mg capsule TAKE 1 CAPSULE BY MOUTH EVERY DAY 90 Capsule 2    pantoprazole (PROTONIX) 40 mg tablet TAKE 1 TABLET BY MOUTH EVERY DAY 90 Tablet 1    PARoxetine (PAXIL) 10 mg tablet TAKE 1 TABLET BY MOUTH EVERY DAY 90 Tablet 0    fluticasone propionate (FLONASE) 50 mcg/actuation nasal spray SPRAY 2 SPRAYS INTO EACH NOSTRIL EVERY DAY 1 Each 12    celecoxib (CELEBREX) 200 mg capsule TAKE 1 CAPSULE BY MOUTH EVERY DAY 30 Capsule 11    SITagliptin (Januvia) 100 mg tablet TAKE 1 TABLET BY MOUTH EVERY DAY 30 Tablet 11    metFORMIN (GLUCOPHAGE) 1,000 mg tablet Take 1 Tablet by mouth two (2) times a day. 60 Tablet 12    baclofen (LIORESAL) 10 mg tablet Take 1 Tablet by mouth two (2) times a day. 60 Tablet 12    clotrimazole-betamethasone (LOTRISONE) topical cream Apply  to affected area two (2) times a day. 45 g 3    cetirizine (ZYRTEC) 10 mg tablet Take 1 Tab by mouth daily. 30 Tab 3    aspirin 81 mg tablet Take  by mouth.        Allergies   Allergen Reactions    Diclofenac Itching       Objective:  Visit Vitals  /80 (BP 1 Location: Right arm, BP Patient Position: Sitting, BP Cuff Size: Adult)   Pulse 90   Temp 98.1 °F (36.7 °C) (Oral)   Resp 18   Ht 5' 1\" (1.549 m)   Wt 171 lb (77.6 kg)   SpO2 98%   BMI 32.31 kg/m²     Physical Exam:   General appearance - alert, well appearing, and in moderate distress  Mental status - alert, oriented to person, place, and time  EYE-LAURI, EOMI, corneas normal, no foreign bodies  ENT-ENT exam normal, no neck nodes or sinus tenderness  Nose - normal and patent, no erythema, discharge or polyps  Mouth - mucous membranes moist, pharynx normal without lesions  Neck - supple, no significant adenopathy   Chest - clear to auscultation, no wheezes, rales or rhonchi, symmetric air entry   Heart - normal rate, regular rhythm, normal S1, S2, no murmurs, rubs, clicks or gallops   Abdomen - soft, nontender, nondistended, no masses or organomegaly  Lymph- no adenopathy palpable  Ext-peripheral pulses normal, no pedal edema, no clubbing or cyanosis  Skin-Warm and dry. no hyperpigmentation, vitiligo, or suspicious lesions  Neuro -alert, oriented, normal speech, no focal findings or movement disorder noted  Neck-normal C-spine, no tenderness, full ROM without pain  Feet-no nail deformities or callus formation with good pulses noted  Back-tenderness lower lumbar spine and sacral spine noted,forward flexion,hyperextension impaired,negative straight leg raise      Results for orders placed or performed in visit on 08/01/22   AMB POC HEMOGLOBIN A1C   Result Value Ref Range    Hemoglobin A1c (POC) 6.9 %   AMB POC GLUCOSE BLOOD, BY GLUCOSE MONITORING DEVICE   Result Value Ref Range    Glucose  MG/DL   AMB POC LIPID PROFILE   Result Value Ref Range    Cholesterol (POC) 197     Triglycerides (POC) 79     HDL Cholesterol (POC) 62     LDL Cholesterol (POC) 119 MG/DL    Non-HDL Goal (POC) 135     TChol/HDL Ratio (POC) 3.2        Assessment/Plan:    ICD-10-CM ICD-9-CM    1. Hypercholesteremia  E78.00 272.0 AMB POC LIPID PROFILE      2. Controlled type 2 diabetes mellitus without complication, without long-term current use of insulin (HCC)  E11.9 250.00 pregabalin (LYRICA) 300 mg capsule      AMB POC HEMOGLOBIN A1C      AMB POC GLUCOSE BLOOD, BY GLUCOSE MONITORING DEVICE      3. Essential hypertension, benign  I10 401.1 CBC W/O DIFF      METABOLIC PANEL, COMPREHENSIVE      CBC W/O DIFF      METABOLIC PANEL, COMPREHENSIVE      4. Spinal stenosis of lumbosacral region  M48.07 724.02       5.  Hot flashes due to menopause  N95.1 627.2 REFERRAL TO GYNECOLOGY        Orders Placed This Encounter    CBC W/O DIFF     Standing Status:   Future     Number of Occurrences:   1     Standing Expiration Date:   8/32/9478    METABOLIC PANEL, COMPREHENSIVE     Standing Status:   Future     Number of Occurrences:   1     Standing Expiration Date:   2/23/2024    REFERRAL TO GYNECOLOGY     Referral Priority: Routine     Referral Type:   Consultation     Referral Reason:   Specialty Services Required     Referral Location:   Dr. Angel Luis Parker. Requested Specialty:   Gynecology     Number of Visits Requested:   1    AMB POC HEMOGLOBIN A1C    AMB POC GLUCOSE BLOOD, BY GLUCOSE MONITORING DEVICE    AMB POC LIPID PROFILE    pregabalin (LYRICA) 300 mg capsule     Sig: Take 1 Capsule by mouth two (2) times a day. Dispense:  60 Capsule     Refill:  0     Not to exceed 5 additional fills before 2023     lose weight, increase physical activity, follow low fat diet, follow low salt diet, call if any problems,Take 81mg aspirin daily  Patient Instructions   [employer] Activation    Thank you for requesting access to In[employer]y. Please follow the instructions below to securely access and download your online medical record. Ingeny allows you to send messages to your doctor, view your test results, renew your prescriptions, schedule appointments, and more. How Do I Sign Up? In your internet browser, go to [email or web address]  Click on the First Time User? Click Here link in the Sign In box. You will be redirect to the New Member Sign Up page. Enter your Ingeny Access Code exactly as it appears below. You will not need to use this code after youve completed the sign-up process. If you do not sign up before the expiration date, you must request a new code. Ingeny Access Code: Activation code not generated  Current Ingeny Status: Active (This is the date your Ingeny access code will )    Enter the last four digits of your Social Security Number (xxxx) and Date of Birth (mm/dd/yyyy) as indicated and click Submit. You will be taken to the next sign-up page. Create a Ingeny ID. This will be your Ingeny login ID and cannot be changed, so think of one that is secure and easy to remember. Create a Ingeny password. You can change your password at any time. Enter your Password Reset Question and Answer. This can be used at a later time if you forget your password. Enter your e-mail address. You will receive e-mail notification when new information is available in [address] Click Sign Up. You can now view and download portions of your medical record. Click the [employer] link to download a portable copy of your medical information. Additional Information    If you have questions, please visit the Frequently Asked Questions section of the [facility] website at [email or web address] Remember, [facility] is NOT to be used for urgent needs. For medical emergencies, dial 911. Follow-up and Dispositions    Return in about 3 months (around 5/23/2023), or if symptoms worsen or fail to improve. I have reviewed with the patient details of the assessment and plan and all questions were answered. Relevent patient education was performed. The most recent lab findings were reviewed with the patient. An After Visit Summary was printed and given to the patient.

## 2023-02-23 NOTE — PROGRESS NOTES
Chief Complaint   Patient presents with    Back Pain    Fatigue    Diabetes     3 most recent PHQ Screens 2/23/2023   PHQ Not Done -   Little interest or pleasure in doing things Not at all   Feeling down, depressed, irritable, or hopeless Several days   Total Score PHQ 2 1   Trouble falling or staying asleep, or sleeping too much -   Feeling tired or having little energy -   Poor appetite, weight loss, or overeating -   Feeling bad about yourself - or that you are a failure or have let yourself or your family down -   Trouble concentrating on things such as school, work, reading, or watching TV -   Moving or speaking so slowly that other people could have noticed; or the opposite being so fidgety that others notice -   Thoughts of being better off dead, or hurting yourself in some way -   PHQ 9 Score -   How difficult have these problems made it for you to do your work, take care of your home and get along with others -     1. Have you been to the ER, urgent care clinic since your last visit? Hospitalized since your last visit?no    2. Have you seen or consulted any other health care providers outside of the [address] [place] [address] since your last visit? Include any pap smears or colon screening.  no

## 2023-02-24 LAB
ALBUMIN SERPL-MCNC: 4.9 G/DL (ref 3.8–4.8)
ALBUMIN/GLOB SERPL: 2 {RATIO} (ref 1.2–2.2)
ALP SERPL-CCNC: 107 IU/L (ref 44–121)
ALT SERPL-CCNC: 37 IU/L (ref 0–32)
AST SERPL-CCNC: 30 IU/L (ref 0–40)
BILIRUB SERPL-MCNC: 0.4 MG/DL (ref 0–1.2)
BUN SERPL-MCNC: 13 MG/DL (ref 8–27)
BUN/CREAT SERPL: 12 (ref 12–28)
CALCIUM SERPL-MCNC: 10.1 MG/DL (ref 8.7–10.3)
CHLORIDE SERPL-SCNC: 101 MMOL/L (ref 96–106)
CO2 SERPL-SCNC: 21 MMOL/L (ref 20–29)
CREAT SERPL-MCNC: 1.06 MG/DL (ref 0.57–1)
EGFRCR SERPLBLD CKD-EPI 2021: 58 ML/MIN/1.73
ERYTHROCYTE [DISTWIDTH] IN BLOOD BY AUTOMATED COUNT: 13.4 % (ref 11.7–15.4)
GLOBULIN SER CALC-MCNC: 2.5 G/DL (ref 1.5–4.5)
GLUCOSE SERPL-MCNC: 171 MG/DL (ref 70–99)
HCT VFR BLD AUTO: 37.4 % (ref 34–46.6)
HGB BLD-MCNC: 12.8 G/DL (ref 11.1–15.9)
MCH RBC QN AUTO: 28.1 PG (ref 26.6–33)
MCHC RBC AUTO-ENTMCNC: 34.2 G/DL (ref 31.5–35.7)
MCV RBC AUTO: 82 FL (ref 79–97)
PLATELET # BLD AUTO: 452 X10E3/UL (ref 150–450)
POTASSIUM SERPL-SCNC: 5.3 MMOL/L (ref 3.5–5.2)
PROT SERPL-MCNC: 7.4 G/DL (ref 6–8.5)
RBC # BLD AUTO: 4.56 X10E6/UL (ref 3.77–5.28)
REPORT: NORMAL
SODIUM SERPL-SCNC: 139 MMOL/L (ref 134–144)
WBC # BLD AUTO: 5.8 X10E3/UL (ref 3.4–10.8)

## 2023-03-14 RX ORDER — PAROXETINE 10 MG/1
TABLET, FILM COATED ORAL
Qty: 90 TABLET | Refills: 0 | Status: SHIPPED | OUTPATIENT
Start: 2023-03-14

## 2023-04-29 RX ORDER — PANTOPRAZOLE SODIUM 40 MG/1
TABLET, DELAYED RELEASE ORAL
COMMUNITY
Start: 2022-12-16 | End: 2023-06-09

## 2023-04-29 RX ORDER — ROSUVASTATIN CALCIUM 20 MG/1
TABLET, COATED ORAL
COMMUNITY
Start: 2023-02-23

## 2023-04-29 RX ORDER — PREGABALIN 300 MG/1
CAPSULE ORAL 2 TIMES DAILY
COMMUNITY
Start: 2023-02-23

## 2023-04-29 RX ORDER — PAROXETINE 10 MG/1
TABLET, FILM COATED ORAL
COMMUNITY
Start: 2022-12-05 | End: 2023-06-09

## 2023-04-29 RX ORDER — CLOTRIMAZOLE AND BETAMETHASONE DIPROPIONATE 10; .64 MG/G; MG/G
CREAM TOPICAL 2 TIMES DAILY
COMMUNITY
Start: 2020-08-04

## 2023-04-29 RX ORDER — BACLOFEN 10 MG/1
TABLET ORAL 2 TIMES DAILY
COMMUNITY
Start: 2022-03-01

## 2023-04-29 RX ORDER — CELECOXIB 200 MG/1
CAPSULE ORAL
COMMUNITY
Start: 2022-09-10

## 2023-04-29 RX ORDER — CETIRIZINE HYDROCHLORIDE 10 MG/1
TABLET ORAL DAILY
COMMUNITY
Start: 2017-07-10

## 2023-04-29 RX ORDER — FLUTICASONE PROPIONATE 50 MCG
SPRAY, SUSPENSION (ML) NASAL
COMMUNITY
Start: 2022-12-05

## 2023-06-09 RX ORDER — PAROXETINE 10 MG/1
TABLET, FILM COATED ORAL
Qty: 90 TABLET | Refills: 3 | Status: SHIPPED | OUTPATIENT
Start: 2023-06-09

## 2023-06-09 RX ORDER — PANTOPRAZOLE SODIUM 40 MG/1
TABLET, DELAYED RELEASE ORAL
Qty: 90 TABLET | Refills: 1 | Status: SHIPPED | OUTPATIENT
Start: 2023-06-09

## 2023-07-20 ENCOUNTER — OFFICE VISIT (OUTPATIENT)
Facility: CLINIC | Age: 68
End: 2023-07-20
Payer: MEDICARE

## 2023-07-20 ENCOUNTER — HOSPITAL ENCOUNTER (OUTPATIENT)
Facility: HOSPITAL | Age: 68
Discharge: HOME | End: 2023-07-20
Payer: MEDICARE

## 2023-07-20 VITALS
OXYGEN SATURATION: 98 % | HEIGHT: 61 IN | TEMPERATURE: 98 F | RESPIRATION RATE: 16 BRPM | WEIGHT: 163 LBS | DIASTOLIC BLOOD PRESSURE: 86 MMHG | BODY MASS INDEX: 30.78 KG/M2 | SYSTOLIC BLOOD PRESSURE: 128 MMHG | HEART RATE: 78 BPM

## 2023-07-20 DIAGNOSIS — M48.07 SPINAL STENOSIS, LUMBOSACRAL REGION: ICD-10-CM

## 2023-07-20 DIAGNOSIS — E11.9 TYPE 2 DIABETES MELLITUS WITHOUT COMPLICATION, WITHOUT LONG-TERM CURRENT USE OF INSULIN (HCC): Primary | ICD-10-CM

## 2023-07-20 DIAGNOSIS — M12.812 ROTATOR CUFF ARTHROPATHY, LEFT: ICD-10-CM

## 2023-07-20 DIAGNOSIS — E78.00 PURE HYPERCHOLESTEROLEMIA, UNSPECIFIED: ICD-10-CM

## 2023-07-20 DIAGNOSIS — I10 ESSENTIAL (PRIMARY) HYPERTENSION: ICD-10-CM

## 2023-07-20 DIAGNOSIS — F33.1 MAJOR DEPRESSIVE DISORDER, RECURRENT, MODERATE (HCC): ICD-10-CM

## 2023-07-20 PROCEDURE — 3079F DIAST BP 80-89 MM HG: CPT | Performed by: INTERNAL MEDICINE

## 2023-07-20 PROCEDURE — 73030 X-RAY EXAM OF SHOULDER: CPT

## 2023-07-20 PROCEDURE — 1123F ACP DISCUSS/DSCN MKR DOCD: CPT | Performed by: INTERNAL MEDICINE

## 2023-07-20 PROCEDURE — 72100 X-RAY EXAM L-S SPINE 2/3 VWS: CPT

## 2023-07-20 PROCEDURE — 3074F SYST BP LT 130 MM HG: CPT | Performed by: INTERNAL MEDICINE

## 2023-07-20 PROCEDURE — 99214 OFFICE O/P EST MOD 30 MIN: CPT | Performed by: INTERNAL MEDICINE

## 2023-07-20 RX ORDER — BACLOFEN 10 MG/1
TABLET ORAL
Qty: 60 TABLET | Refills: 3 | Status: SHIPPED | OUTPATIENT
Start: 2023-07-20

## 2023-07-20 RX ORDER — PREGABALIN 300 MG/1
CAPSULE ORAL
Qty: 60 CAPSULE | Refills: 1 | Status: SHIPPED | OUTPATIENT
Start: 2023-07-20 | End: 2023-08-20

## 2023-07-20 RX ORDER — PREDNISONE 20 MG/1
20 TABLET ORAL 2 TIMES DAILY
Qty: 10 TABLET | Refills: 0 | Status: SHIPPED | OUTPATIENT
Start: 2023-07-20 | End: 2023-07-25

## 2023-07-20 RX ORDER — FENOFIBRATE 134 MG/1
CAPSULE ORAL DAILY
COMMUNITY
Start: 2023-04-14

## 2023-07-20 ASSESSMENT — PATIENT HEALTH QUESTIONNAIRE - PHQ9
SUM OF ALL RESPONSES TO PHQ QUESTIONS 1-9: 0
SUM OF ALL RESPONSES TO PHQ QUESTIONS 1-9: 0
SUM OF ALL RESPONSES TO PHQ9 QUESTIONS 1 & 2: 0
SUM OF ALL RESPONSES TO PHQ QUESTIONS 1-9: 0
2. FEELING DOWN, DEPRESSED OR HOPELESS: 0
1. LITTLE INTEREST OR PLEASURE IN DOING THINGS: 0
SUM OF ALL RESPONSES TO PHQ QUESTIONS 1-9: 0

## 2023-07-20 NOTE — PROGRESS NOTES
1. Have you been to the ER, urgent care clinic since your last visit? Hospitalized since your last visit? No    2. Have you seen or consulted any other health care providers outside of the [address] [place] [address] since your last visit? Include any pap smears or colon screening.  No     Chief Complaint   Patient presents with    Diabetes    Cholesterol Problem         PHQ-9 Total Score: 0 (7/20/2023  1:22 PM)

## 2023-07-20 NOTE — PROGRESS NOTES
Brandon Alvarez is a 79 y.o. female and presents with Diabetes and Cholesterol Problem  . Subjective:  Back Pain Review:  Patient presents for evaluation of low back problems. Symptoms have been present for  months and include pain in lower back (dull, moderate in character; 9/10 in severity). Initial inciting event: unknown. Symptoms are worst: at times. Alleviating factors identifiable by patient are lying flat, medication . Exacerbating factors identifiable by patient are bending forwards, bending backwards. Treatments so far initiated by patient: medication Previous lower back problems: reported. Previous workup: none. .Dyslipidemia Review:  Patient presents for evaluation of lipids. Compliance with treatment thus far has been excellent. A repeat fasting lipid profile was not done. The patient does not use medications that may worsen dyslipidemias (corticosteroids, progestins, anabolic steroids, amiodarone, cyclosporine, olanzapine). The patient exercises some    Diabetes Mellitus Review:  She has diabetes mellitus. Diabetic ROS - medication compliance: compliant all of the time, diabetic diet compliance: compliant all of the time, home glucose monitoring: is performed. Known diabetic complications: neuropathy  Cardiovascular risk factors: family history, dyslipidemia, diabetes mellitus, obesity, hypertension  Current diabetic medications include oral agents/  Eye exam current (within one year): no  Weight trend: stable  Prior visit with dietician: no  Current diet: \"healthy\" diet  in general  Current exercise: walking  Current monitoring regimen: home blood tests - daily  Home blood sugar records: trend: stable  Any episodes of hypoglycemia? no  Is She on ACE inhibitor or angiotensin II receptor blocker? Yes     Shoulder Pain Review:  Patient complains of left side shoulder pain. The symptoms began several weeks ago Course of symptoms since onset has been gradually worsening.  Pain is described as

## 2023-07-21 LAB
ALBUMIN SERPL-MCNC: 4.7 G/DL (ref 3.9–4.9)
ALBUMIN/GLOB SERPL: 2.1 {RATIO} (ref 1.2–2.2)
ALP SERPL-CCNC: 135 IU/L (ref 44–121)
ALT SERPL-CCNC: 22 IU/L (ref 0–32)
AST SERPL-CCNC: 21 IU/L (ref 0–40)
BILIRUB SERPL-MCNC: 0.4 MG/DL (ref 0–1.2)
BUN SERPL-MCNC: 20 MG/DL (ref 8–27)
BUN/CREAT SERPL: 19 (ref 12–28)
CALCIUM SERPL-MCNC: 9.7 MG/DL (ref 8.7–10.3)
CHLORIDE SERPL-SCNC: 105 MMOL/L (ref 96–106)
CHOLEST SERPL-MCNC: 156 MG/DL (ref 100–199)
CO2 SERPL-SCNC: 21 MMOL/L (ref 20–29)
CREAT SERPL-MCNC: 1.05 MG/DL (ref 0.57–1)
EGFRCR SERPLBLD CKD-EPI 2021: 58 ML/MIN/1.73
ERYTHROCYTE [DISTWIDTH] IN BLOOD BY AUTOMATED COUNT: 14.3 % (ref 11.7–15.4)
GLOBULIN SER CALC-MCNC: 2.2 G/DL (ref 1.5–4.5)
GLUCOSE SERPL-MCNC: 120 MG/DL (ref 70–99)
HBA1C MFR BLD: 7.9 % (ref 4.8–5.6)
HCT VFR BLD AUTO: 39.6 % (ref 34–46.6)
HDLC SERPL-MCNC: 53 MG/DL
HGB BLD-MCNC: 12.5 G/DL (ref 11.1–15.9)
IMP & REVIEW OF LAB RESULTS: NORMAL
LDLC SERPL CALC-MCNC: 84 MG/DL (ref 0–99)
MCH RBC QN AUTO: 26.8 PG (ref 26.6–33)
MCHC RBC AUTO-ENTMCNC: 31.6 G/DL (ref 31.5–35.7)
MCV RBC AUTO: 85 FL (ref 79–97)
PLATELET # BLD AUTO: 322 X10E3/UL (ref 150–450)
POTASSIUM SERPL-SCNC: 5.1 MMOL/L (ref 3.5–5.2)
PROT SERPL-MCNC: 6.9 G/DL (ref 6–8.5)
RBC # BLD AUTO: 4.66 X10E6/UL (ref 3.77–5.28)
REPORT: NORMAL
SODIUM SERPL-SCNC: 143 MMOL/L (ref 134–144)
TRIGL SERPL-MCNC: 106 MG/DL (ref 0–149)
VLDLC SERPL CALC-MCNC: 19 MG/DL (ref 5–40)
WBC # BLD AUTO: 5.6 X10E3/UL (ref 3.4–10.8)

## 2023-08-29 LAB — HBA1C MFR BLD HPLC: 6.8 %

## 2023-08-29 RX ORDER — PAROXETINE 10 MG/1
10 TABLET, FILM COATED ORAL DAILY
Qty: 90 TABLET | Refills: 3 | Status: SHIPPED | OUTPATIENT
Start: 2023-08-29

## 2023-08-29 RX ORDER — PANTOPRAZOLE SODIUM 40 MG/1
40 TABLET, DELAYED RELEASE ORAL DAILY
Qty: 90 TABLET | Refills: 1 | Status: SHIPPED | OUTPATIENT
Start: 2023-08-29

## 2023-09-13 DIAGNOSIS — G51.9 DISORDER OF FACIAL NERVE, UNSPECIFIED: ICD-10-CM

## 2023-09-18 RX ORDER — SITAGLIPTIN 100 MG/1
100 TABLET, FILM COATED ORAL DAILY
Qty: 90 TABLET | Refills: 3 | Status: SHIPPED | OUTPATIENT
Start: 2023-09-18

## 2023-09-18 RX ORDER — BACLOFEN 10 MG/1
TABLET ORAL
Qty: 180 TABLET | Refills: 1 | Status: SHIPPED | OUTPATIENT
Start: 2023-09-18

## 2024-02-07 ENCOUNTER — TRANSCRIBE ORDERS (OUTPATIENT)
Facility: HOSPITAL | Age: 69
End: 2024-02-07

## 2024-02-07 DIAGNOSIS — Z12.31 ENCOUNTER FOR MAMMOGRAM TO ESTABLISH BASELINE MAMMOGRAM: Primary | ICD-10-CM

## 2024-02-28 ENCOUNTER — HOSPITAL ENCOUNTER (OUTPATIENT)
Facility: HOSPITAL | Age: 69
Discharge: HOME | End: 2024-03-02
Payer: MEDICARE

## 2024-02-28 VITALS — WEIGHT: 170 LBS | HEIGHT: 60 IN | BODY MASS INDEX: 33.38 KG/M2

## 2024-02-28 DIAGNOSIS — Z12.31 ENCOUNTER FOR MAMMOGRAM TO ESTABLISH BASELINE MAMMOGRAM: ICD-10-CM

## 2024-02-28 PROCEDURE — 77063 BREAST TOMOSYNTHESIS BI: CPT

## 2024-04-03 RX ORDER — FLUTICASONE PROPIONATE 50 MCG
SPRAY, SUSPENSION (ML) NASAL
Qty: 1 EACH | Refills: 4 | Status: SHIPPED | OUTPATIENT
Start: 2024-04-03

## 2024-04-03 RX ORDER — BACLOFEN 10 MG/1
TABLET ORAL
Qty: 180 TABLET | Refills: 1 | Status: SHIPPED | OUTPATIENT
Start: 2024-04-03

## 2024-10-01 RX ORDER — FLUTICASONE PROPIONATE 50 MCG
SPRAY, SUSPENSION (ML) NASAL
Qty: 1 EACH | Refills: 3 | Status: SHIPPED | OUTPATIENT
Start: 2024-10-01

## 2024-10-04 RX ORDER — BACLOFEN 10 MG/1
TABLET ORAL
Qty: 180 TABLET | Refills: 1 | Status: SHIPPED | OUTPATIENT
Start: 2024-10-04

## 2025-01-23 ENCOUNTER — OFFICE VISIT (OUTPATIENT)
Facility: CLINIC | Age: 70
End: 2025-01-23
Payer: MEDICARE

## 2025-01-23 VITALS
HEART RATE: 81 BPM | DIASTOLIC BLOOD PRESSURE: 80 MMHG | WEIGHT: 161 LBS | BODY MASS INDEX: 30.4 KG/M2 | SYSTOLIC BLOOD PRESSURE: 131 MMHG | RESPIRATION RATE: 16 BRPM | TEMPERATURE: 99 F | OXYGEN SATURATION: 98 % | HEIGHT: 61 IN

## 2025-01-23 DIAGNOSIS — F33.1 MAJOR DEPRESSIVE DISORDER, RECURRENT, MODERATE (HCC): ICD-10-CM

## 2025-01-23 DIAGNOSIS — E78.00 PURE HYPERCHOLESTEROLEMIA, UNSPECIFIED: ICD-10-CM

## 2025-01-23 DIAGNOSIS — E11.9 TYPE 2 DIABETES MELLITUS WITHOUT COMPLICATION, WITHOUT LONG-TERM CURRENT USE OF INSULIN (HCC): Primary | ICD-10-CM

## 2025-01-23 DIAGNOSIS — E11.43 DIABETIC AUTONOMIC NEUROPATHY ASSOCIATED WITH TYPE 2 DIABETES MELLITUS (HCC): ICD-10-CM

## 2025-01-23 DIAGNOSIS — I10 ESSENTIAL (PRIMARY) HYPERTENSION: ICD-10-CM

## 2025-01-23 PROCEDURE — 1123F ACP DISCUSS/DSCN MKR DOCD: CPT | Performed by: INTERNAL MEDICINE

## 2025-01-23 PROCEDURE — 1159F MED LIST DOCD IN RCRD: CPT | Performed by: INTERNAL MEDICINE

## 2025-01-23 PROCEDURE — 3079F DIAST BP 80-89 MM HG: CPT | Performed by: INTERNAL MEDICINE

## 2025-01-23 PROCEDURE — 3075F SYST BP GE 130 - 139MM HG: CPT | Performed by: INTERNAL MEDICINE

## 2025-01-23 PROCEDURE — 99214 OFFICE O/P EST MOD 30 MIN: CPT | Performed by: INTERNAL MEDICINE

## 2025-01-23 RX ORDER — HYDROCHLOROTHIAZIDE 12.5 MG/1
12.5 CAPSULE ORAL
COMMUNITY
Start: 2024-12-18

## 2025-01-23 SDOH — ECONOMIC STABILITY: FOOD INSECURITY: WITHIN THE PAST 12 MONTHS, THE FOOD YOU BOUGHT JUST DIDN'T LAST AND YOU DIDN'T HAVE MONEY TO GET MORE.: NEVER TRUE

## 2025-01-23 SDOH — ECONOMIC STABILITY: FOOD INSECURITY: WITHIN THE PAST 12 MONTHS, YOU WORRIED THAT YOUR FOOD WOULD RUN OUT BEFORE YOU GOT MONEY TO BUY MORE.: NEVER TRUE

## 2025-01-23 ASSESSMENT — PATIENT HEALTH QUESTIONNAIRE - PHQ9
SUM OF ALL RESPONSES TO PHQ QUESTIONS 1-9: 0
SUM OF ALL RESPONSES TO PHQ QUESTIONS 1-9: 0
SUM OF ALL RESPONSES TO PHQ9 QUESTIONS 1 & 2: 0
SUM OF ALL RESPONSES TO PHQ QUESTIONS 1-9: 0
SUM OF ALL RESPONSES TO PHQ QUESTIONS 1-9: 0
1. LITTLE INTEREST OR PLEASURE IN DOING THINGS: NOT AT ALL
2. FEELING DOWN, DEPRESSED OR HOPELESS: NOT AT ALL

## 2025-01-23 NOTE — PROGRESS NOTES
Chief Complaint   Patient presents with    Establish Care     1. Have you been to the ER, urgent care clinic since your last visit?  Hospitalized since your last visit?No    2. Have you seen or consulted any other health care providers outside of the [facility] since your last visit?  Include any pap smears or colon screening. No    
capsule Take 1 tab  by mouth 2 times daily. 60 capsule 1     No current facility-administered medications for this visit.     Allergies   Allergen Reactions    Diclofenac Itching       Objective:  /80   Pulse 81   Temp 99 °F (37.2 °C) (Oral)   Resp 16   Ht 1.549 m (5' 1\")   Wt 73 kg (161 lb)   SpO2 98%   BMI 30.42 kg/m²   Physical Exam:   General appearance - alert, well appearing, and in no distress  Mental status - alert, oriented to person, place, and time  EYE-GEORGETTE, EOMI, corneas normal, no foreign bodies  ENT-ENT exam normal, no neck nodes or sinus tenderness  Nose - normal and patent, no erythema, discharge or polyps  Mouth - mucous membranes moist, pharynx normal without lesions  Neck - supple, no significant adenopathy   Chest - clear to auscultation, no wheezes, rales or rhonchi, symmetric air entry   Heart - normal rate, regular rhythm, normal S1, S2, no murmurs, rubs, clicks or gallops   Abdomen - soft, nontender, nondistended, no masses or organomegaly  Lymph- no adenopathy palpable  Ext-peripheral pulses normal, no pedal edema, no clubbing or cyanosis  Skin-Warm and dry. no hyperpigmentation, vitiligo, or suspicious lesions  Neuro -alert, oriented, normal speech, no focal findings or movement disorder noted  Neck-normal C-spine, no tenderness, full ROM without pain  Feet-no nail deformities or callus formation with good pulses noted      No results found for this visit on 01/23/25.    Assessment/Plan:    ICD-10-CM    1. Type 2 diabetes mellitus without complication, without long-term current use of insulin (HCC)  E11.9 CBC     Comprehensive Metabolic Panel     Hemoglobin A1C      2. Pure hypercholesterolemia, unspecified  E78.00 Lipid Panel      3. Essential (primary) hypertension  I10       4. Major depressive disorder, recurrent, moderate (HCC)  F33.1       5. Diabetic autonomic neuropathy associated with type 2 diabetes mellitus (HCC)  E11.43 Hemoglobin A1C        Orders Placed This

## 2025-01-24 LAB
ALBUMIN SERPL-MCNC: 4.5 G/DL (ref 3.9–4.9)
ALP SERPL-CCNC: 112 IU/L (ref 44–121)
ALT SERPL-CCNC: 29 IU/L (ref 0–32)
AST SERPL-CCNC: 27 IU/L (ref 0–40)
BILIRUB SERPL-MCNC: 0.4 MG/DL (ref 0–1.2)
BUN SERPL-MCNC: 28 MG/DL (ref 8–27)
BUN/CREAT SERPL: 16 (ref 12–28)
CALCIUM SERPL-MCNC: 9.6 MG/DL (ref 8.7–10.3)
CHLORIDE SERPL-SCNC: 104 MMOL/L (ref 96–106)
CHOLEST SERPL-MCNC: 139 MG/DL (ref 100–199)
CO2 SERPL-SCNC: 19 MMOL/L (ref 20–29)
CREAT SERPL-MCNC: 1.8 MG/DL (ref 0.57–1)
EGFRCR SERPLBLD CKD-EPI 2021: 30 ML/MIN/1.73
ERYTHROCYTE [DISTWIDTH] IN BLOOD BY AUTOMATED COUNT: 15.2 % (ref 11.7–15.4)
GLOBULIN SER CALC-MCNC: 2.4 G/DL (ref 1.5–4.5)
GLUCOSE SERPL-MCNC: 123 MG/DL (ref 70–99)
HBA1C MFR BLD: 7.1 % (ref 4.8–5.6)
HCT VFR BLD AUTO: 37.8 % (ref 34–46.6)
HDLC SERPL-MCNC: 45 MG/DL
HGB BLD-MCNC: 11.9 G/DL (ref 11.1–15.9)
IMP & REVIEW OF LAB RESULTS: NORMAL
LDLC SERPL CALC-MCNC: 65 MG/DL (ref 0–99)
Lab: NORMAL
MCH RBC QN AUTO: 27.2 PG (ref 26.6–33)
MCHC RBC AUTO-ENTMCNC: 31.5 G/DL (ref 31.5–35.7)
MCV RBC AUTO: 86 FL (ref 79–97)
PLATELET # BLD AUTO: 331 X10E3/UL (ref 150–450)
POTASSIUM SERPL-SCNC: 4.1 MMOL/L (ref 3.5–5.2)
PROT SERPL-MCNC: 6.9 G/DL (ref 6–8.5)
RBC # BLD AUTO: 4.38 X10E6/UL (ref 3.77–5.28)
REPORT: NORMAL
REPORT: NORMAL
SODIUM SERPL-SCNC: 140 MMOL/L (ref 134–144)
TRIGL SERPL-MCNC: 169 MG/DL (ref 0–149)
VLDLC SERPL CALC-MCNC: 29 MG/DL (ref 5–40)
WBC # BLD AUTO: 8.9 X10E3/UL (ref 3.4–10.8)

## 2025-01-30 RX ORDER — ROSUVASTATIN CALCIUM 20 MG/1
20 TABLET, COATED ORAL DAILY
Qty: 30 TABLET | Refills: 12 | Status: SHIPPED | OUTPATIENT
Start: 2025-01-30

## 2025-02-27 DIAGNOSIS — G51.9 DISORDER OF FACIAL NERVE, UNSPECIFIED: ICD-10-CM

## 2025-03-01 RX ORDER — PANTOPRAZOLE SODIUM 40 MG/1
40 TABLET, DELAYED RELEASE ORAL DAILY
Qty: 90 TABLET | Refills: 3 | Status: SHIPPED | OUTPATIENT
Start: 2025-03-01

## 2025-03-18 ENCOUNTER — OFFICE VISIT (OUTPATIENT)
Facility: CLINIC | Age: 70
End: 2025-03-18
Payer: MEDICARE

## 2025-03-18 VITALS
BODY MASS INDEX: 29.45 KG/M2 | HEART RATE: 95 BPM | HEIGHT: 61 IN | WEIGHT: 156 LBS | OXYGEN SATURATION: 100 % | RESPIRATION RATE: 16 BRPM | DIASTOLIC BLOOD PRESSURE: 68 MMHG | SYSTOLIC BLOOD PRESSURE: 128 MMHG | TEMPERATURE: 98 F

## 2025-03-18 DIAGNOSIS — E04.1 THYROID NODULE: ICD-10-CM

## 2025-03-18 DIAGNOSIS — E11.9 TYPE 2 DIABETES MELLITUS WITHOUT COMPLICATION, WITHOUT LONG-TERM CURRENT USE OF INSULIN: Primary | ICD-10-CM

## 2025-03-18 DIAGNOSIS — E78.00 PURE HYPERCHOLESTEROLEMIA, UNSPECIFIED: ICD-10-CM

## 2025-03-18 DIAGNOSIS — I10 ESSENTIAL (PRIMARY) HYPERTENSION: ICD-10-CM

## 2025-03-18 DIAGNOSIS — Z00.00 MEDICARE ANNUAL WELLNESS VISIT, SUBSEQUENT: ICD-10-CM

## 2025-03-18 DIAGNOSIS — F33.1 MAJOR DEPRESSIVE DISORDER, RECURRENT, MODERATE (HCC): ICD-10-CM

## 2025-03-18 LAB
GLUCOSE, POC: 228 MG/DL
HBA1C MFR BLD: 7.1 %

## 2025-03-18 PROCEDURE — 99214 OFFICE O/P EST MOD 30 MIN: CPT | Performed by: INTERNAL MEDICINE

## 2025-03-18 PROCEDURE — 1159F MED LIST DOCD IN RCRD: CPT | Performed by: INTERNAL MEDICINE

## 2025-03-18 PROCEDURE — 83036 HEMOGLOBIN GLYCOSYLATED A1C: CPT | Performed by: INTERNAL MEDICINE

## 2025-03-18 PROCEDURE — 1123F ACP DISCUSS/DSCN MKR DOCD: CPT | Performed by: INTERNAL MEDICINE

## 2025-03-18 PROCEDURE — 3078F DIAST BP <80 MM HG: CPT | Performed by: INTERNAL MEDICINE

## 2025-03-18 PROCEDURE — 3074F SYST BP LT 130 MM HG: CPT | Performed by: INTERNAL MEDICINE

## 2025-03-18 PROCEDURE — G0439 PPPS, SUBSEQ VISIT: HCPCS | Performed by: INTERNAL MEDICINE

## 2025-03-18 PROCEDURE — 82962 GLUCOSE BLOOD TEST: CPT | Performed by: INTERNAL MEDICINE

## 2025-03-18 PROCEDURE — 1125F AMNT PAIN NOTED PAIN PRSNT: CPT | Performed by: INTERNAL MEDICINE

## 2025-03-18 PROCEDURE — 3051F HG A1C>EQUAL 7.0%<8.0%: CPT | Performed by: INTERNAL MEDICINE

## 2025-03-18 ASSESSMENT — PATIENT HEALTH QUESTIONNAIRE - PHQ9
3. TROUBLE FALLING OR STAYING ASLEEP: NOT AT ALL
7. TROUBLE CONCENTRATING ON THINGS, SUCH AS READING THE NEWSPAPER OR WATCHING TELEVISION: NOT AT ALL
SUM OF ALL RESPONSES TO PHQ QUESTIONS 1-9: 0
9. THOUGHTS THAT YOU WOULD BE BETTER OFF DEAD, OR OF HURTING YOURSELF: NOT AT ALL
6. FEELING BAD ABOUT YOURSELF - OR THAT YOU ARE A FAILURE OR HAVE LET YOURSELF OR YOUR FAMILY DOWN: NOT AT ALL
SUM OF ALL RESPONSES TO PHQ QUESTIONS 1-9: 0
SUM OF ALL RESPONSES TO PHQ QUESTIONS 1-9: 0
4. FEELING TIRED OR HAVING LITTLE ENERGY: NOT AT ALL
2. FEELING DOWN, DEPRESSED OR HOPELESS: NOT AT ALL
5. POOR APPETITE OR OVEREATING: NOT AT ALL
10. IF YOU CHECKED OFF ANY PROBLEMS, HOW DIFFICULT HAVE THESE PROBLEMS MADE IT FOR YOU TO DO YOUR WORK, TAKE CARE OF THINGS AT HOME, OR GET ALONG WITH OTHER PEOPLE: NOT DIFFICULT AT ALL
8. MOVING OR SPEAKING SO SLOWLY THAT OTHER PEOPLE COULD HAVE NOTICED. OR THE OPPOSITE, BEING SO FIGETY OR RESTLESS THAT YOU HAVE BEEN MOVING AROUND A LOT MORE THAN USUAL: NOT AT ALL
SUM OF ALL RESPONSES TO PHQ QUESTIONS 1-9: 0
1. LITTLE INTEREST OR PLEASURE IN DOING THINGS: NOT AT ALL

## 2025-03-18 ASSESSMENT — LIFESTYLE VARIABLES
HOW MANY STANDARD DRINKS CONTAINING ALCOHOL DO YOU HAVE ON A TYPICAL DAY: PATIENT DOES NOT DRINK
HOW OFTEN DO YOU HAVE A DRINK CONTAINING ALCOHOL: NEVER

## 2025-03-18 NOTE — PROGRESS NOTES
Medicare Annual Wellness Visit    Ale Basurto is here for Medicare AWV, Goiter, Diabetes, and HOT FLASH    Assessment & Plan   Type 2 diabetes mellitus without complication, without long-term current use of insulin (HCC)  -     ESTABLISHED, MOD MDM, 30-39 MIN [85733]  Pure hypercholesterolemia, unspecified  -     ESTABLISHED, MOD MDM, 30-39 MIN [12516]  Essential (primary) hypertension  -     CBC; Future  -     Comprehensive Metabolic Panel; Future  -     ESTABLISHED, MOD MDM, 30-39 MIN [69659]  Major depressive disorder, recurrent, moderate (HCC)  Medicare annual wellness visit, subsequent  Thyroid nodule  -     TSH; Future  -     T4; Future  -     US THYROID; Future       Return in 3 months (on 6/18/2025).     Subjective   Hypertension Review:  The patient has hypertension .  Diet and Lifestyle: generally follows a low sodium diet, exercises sporadically  Home BP Monitoring: is not measured at home.  Pertinent ROS: taking medications as instructed, no medication side effects noted, no TIA's, no chest pain on exertion, no dyspnea on exertion, no swelling of ankles.    Dyslipidemia Review:  Patient presents for evaluation of lipids.  Compliance with treatment thus far has been excellent.  A repeat fasting lipid profile was not done.  The patient does not use medications that may worsen dyslipidemias . The patient exercises sporadically.      Diabetes Mellitus Review:  She has diabetes mellitus.  Diabetic ROS - medication compliance: compliant all of the time, diabetic diet compliance: compliant all of the time, home glucose monitoring: is performed.   Known diabetic complications: none  Cardiovascular risk factors: family history, dyslipidemia, diabetes mellitus, obesity, hypertension  Current diabetic medications include oral agents/   Eye exam current (within one year): no  Weight trend: stable  Prior visit with dietician: no  Current diet: \"healthy\" diet  in general  Current exercise: walking  Current monitoring

## 2025-03-18 NOTE — PATIENT INSTRUCTIONS
information.         Advance Directives: Care Instructions  Overview  An advance directive is a legal way to state your wishes at the end of your life. It tells your family and your doctor what to do if you can't say what you want.  There are two main types of advance directives. You can change them any time your wishes change.  Living will.  This form tells your family and your doctor your wishes about life support and other treatment. The form is also called a declaration.  Medical power of .  This form lets you name a person to make treatment decisions for you when you can't speak for yourself. This person is called a health care agent (health care proxy, health care surrogate). The form is also called a durable power of  for health care.  If you do not have an advance directive, decisions about your medical care may be made by a family member, or by a doctor or a  who doesn't know you.  It may help to think of an advance directive as a gift to the people who care for you. If you have one, they won't have to make tough decisions by themselves.  For more information, including forms for your state, see the [employer] website (www.c[employer].org/planning/advance-directives/).  Follow-up care is a key part of your treatment and safety. Be sure to make and go to all appointments, and call your doctor if you are having problems. It's also a good idea to know your test results and keep a list of the medicines you take.  What should you include in an advance directive?  Many states have a unique advance directive form. (It may ask you to address specific issues.) Or you might use a universal form that's approved by many states.  If your form doesn't tell you what to address, it may be hard to know what to include in your advance directive. Use the questions below to help you get started.  Who do you want to make decisions about your medical care if you are not able to?  What life-support measures do

## 2025-03-18 NOTE — PROGRESS NOTES
\"Have you been to the ER, urgent care clinic since your last visit?  Hospitalized since your last visit?\"    NO    “Have you seen or consulted any other health care providers outside our system since your last visit?”    NO      “Have you had a colorectal cancer screening such as a colonoscopy/FIT/Cologuard?    NO    No colonoscopy on file  No cologuard on file  Date of last FIT: 9/4/2020   No flexible sigmoidoscopy on file     “Have you had a diabetic eye exam?”    NO     No diabetic eye exam on file

## 2025-03-19 LAB
ALBUMIN SERPL-MCNC: 4.6 G/DL (ref 3.9–4.9)
ALP SERPL-CCNC: 113 IU/L (ref 44–121)
ALT SERPL-CCNC: 22 IU/L (ref 0–32)
AST SERPL-CCNC: 22 IU/L (ref 0–40)
BILIRUB SERPL-MCNC: 0.4 MG/DL (ref 0–1.2)
BUN SERPL-MCNC: 19 MG/DL (ref 8–27)
BUN/CREAT SERPL: 13 (ref 12–28)
CALCIUM SERPL-MCNC: 9.9 MG/DL (ref 8.7–10.3)
CHLORIDE SERPL-SCNC: 97 MMOL/L (ref 96–106)
CO2 SERPL-SCNC: 26 MMOL/L (ref 20–29)
CREAT SERPL-MCNC: 1.41 MG/DL (ref 0.57–1)
EGFRCR SERPLBLD CKD-EPI 2021: 40 ML/MIN/1.73
ERYTHROCYTE [DISTWIDTH] IN BLOOD BY AUTOMATED COUNT: 14.3 % (ref 11.7–15.4)
GLOBULIN SER CALC-MCNC: 2.5 G/DL (ref 1.5–4.5)
GLUCOSE SERPL-MCNC: 184 MG/DL (ref 70–99)
HCT VFR BLD AUTO: 37.4 % (ref 34–46.6)
HGB BLD-MCNC: 12.2 G/DL (ref 11.1–15.9)
MCH RBC QN AUTO: 27.2 PG (ref 26.6–33)
MCHC RBC AUTO-ENTMCNC: 32.6 G/DL (ref 31.5–35.7)
MCV RBC AUTO: 84 FL (ref 79–97)
PLATELET # BLD AUTO: 326 X10E3/UL (ref 150–450)
POTASSIUM SERPL-SCNC: 3.4 MMOL/L (ref 3.5–5.2)
PROT SERPL-MCNC: 7.1 G/DL (ref 6–8.5)
RBC # BLD AUTO: 4.48 X10E6/UL (ref 3.77–5.28)
REPORT: NORMAL
SODIUM SERPL-SCNC: 138 MMOL/L (ref 134–144)
T4 SERPL-MCNC: 8.4 UG/DL (ref 4.5–12)
TSH SERPL DL<=0.005 MIU/L-ACNC: 1.53 UIU/ML (ref 0.45–4.5)
WBC # BLD AUTO: 8.4 X10E3/UL (ref 3.4–10.8)

## 2025-03-21 ENCOUNTER — HOSPITAL ENCOUNTER (OUTPATIENT)
Facility: HOSPITAL | Age: 70
Discharge: HOME | End: 2025-03-24
Attending: INTERNAL MEDICINE
Payer: MEDICARE

## 2025-03-21 VITALS — BODY MASS INDEX: 29.45 KG/M2 | HEIGHT: 61 IN | WEIGHT: 156 LBS

## 2025-03-21 DIAGNOSIS — Z12.31 VISIT FOR SCREENING MAMMOGRAM: ICD-10-CM

## 2025-03-21 DIAGNOSIS — E04.1 THYROID NODULE: ICD-10-CM

## 2025-03-21 PROCEDURE — 77063 BREAST TOMOSYNTHESIS BI: CPT

## 2025-03-21 PROCEDURE — 76536 US EXAM OF HEAD AND NECK: CPT

## 2025-04-10 RX ORDER — HYDROCHLOROTHIAZIDE 12.5 MG/1
12.5 CAPSULE ORAL EVERY MORNING
Qty: 30 CAPSULE | Refills: 3 | Status: SHIPPED | OUTPATIENT
Start: 2025-04-10

## 2025-04-10 NOTE — TELEPHONE ENCOUNTER
Pt left a voice message requesting a new prescription for the HCTZ.     [phone]    Pt did not specify a strength for the HCTZ. Pt shows on medication list a strength of 12.5 mg capsules.     Last appointment: 01/23/2025 MD Haskins   Next appointment: 06/18/2025 MD Haskins     For Pharmacy Admin Tracking Only    Program: Medication Refill  Intervention Detail: New Rx: 1, reason: Patient Preference  Time Spent (min): 5    Requested Prescriptions     Pending Prescriptions Disp Refills    hydroCHLOROthiazide 12.5 MG capsule

## 2025-04-29 RX ORDER — PAROXETINE 10 MG/1
10 TABLET, FILM COATED ORAL DAILY
Qty: 90 TABLET | Refills: 3 | Status: SHIPPED | OUTPATIENT
Start: 2025-04-29

## 2025-05-07 DIAGNOSIS — G51.9 DISORDER OF FACIAL NERVE, UNSPECIFIED: ICD-10-CM

## 2025-05-19 RX ORDER — FLUTICASONE PROPIONATE 50 MCG
SPRAY, SUSPENSION (ML) NASAL
Qty: 48 ML | Refills: 1 | OUTPATIENT
Start: 2025-05-19

## 2025-05-19 RX ORDER — FLUTICASONE PROPIONATE 50 MCG
2 SPRAY, SUSPENSION (ML) NASAL DAILY
Qty: 1 EACH | Refills: 3 | Status: SHIPPED | OUTPATIENT
Start: 2025-05-19

## 2025-06-18 ENCOUNTER — OFFICE VISIT (OUTPATIENT)
Facility: CLINIC | Age: 70
End: 2025-06-18
Payer: MEDICARE

## 2025-06-18 VITALS
SYSTOLIC BLOOD PRESSURE: 136 MMHG | HEART RATE: 84 BPM | RESPIRATION RATE: 18 BRPM | OXYGEN SATURATION: 97 % | WEIGHT: 144 LBS | HEIGHT: 61 IN | BODY MASS INDEX: 27.19 KG/M2 | DIASTOLIC BLOOD PRESSURE: 90 MMHG

## 2025-06-18 DIAGNOSIS — F33.1 MAJOR DEPRESSIVE DISORDER, RECURRENT, MODERATE (HCC): ICD-10-CM

## 2025-06-18 DIAGNOSIS — E87.6 HYPOKALEMIA: ICD-10-CM

## 2025-06-18 DIAGNOSIS — M54.16 LUMBAR RADICULOPATHY: ICD-10-CM

## 2025-06-18 DIAGNOSIS — I10 ESSENTIAL (PRIMARY) HYPERTENSION: ICD-10-CM

## 2025-06-18 DIAGNOSIS — E11.9 TYPE 2 DIABETES MELLITUS WITHOUT COMPLICATION, WITHOUT LONG-TERM CURRENT USE OF INSULIN (HCC): Primary | ICD-10-CM

## 2025-06-18 DIAGNOSIS — E78.00 PURE HYPERCHOLESTEROLEMIA, UNSPECIFIED: ICD-10-CM

## 2025-06-18 PROCEDURE — 3051F HG A1C>EQUAL 7.0%<8.0%: CPT | Performed by: INTERNAL MEDICINE

## 2025-06-18 PROCEDURE — 1126F AMNT PAIN NOTED NONE PRSNT: CPT | Performed by: INTERNAL MEDICINE

## 2025-06-18 PROCEDURE — 3075F SYST BP GE 130 - 139MM HG: CPT | Performed by: INTERNAL MEDICINE

## 2025-06-18 PROCEDURE — 1123F ACP DISCUSS/DSCN MKR DOCD: CPT | Performed by: INTERNAL MEDICINE

## 2025-06-18 PROCEDURE — 99214 OFFICE O/P EST MOD 30 MIN: CPT | Performed by: INTERNAL MEDICINE

## 2025-06-18 PROCEDURE — 1159F MED LIST DOCD IN RCRD: CPT | Performed by: INTERNAL MEDICINE

## 2025-06-18 PROCEDURE — 3080F DIAST BP >= 90 MM HG: CPT | Performed by: INTERNAL MEDICINE

## 2025-06-18 RX ORDER — PREDNISONE 5 MG/1
TABLET ORAL
Qty: 21 EACH | Refills: 1 | Status: SHIPPED | OUTPATIENT
Start: 2025-06-18

## 2025-06-18 NOTE — PROGRESS NOTES
Ale Basurto is a 69 y.o. female and presents with Hypertension, Cholesterol Problem, Diabetes, and Gastroesophageal Reflux  .  Subjective:    Hypertension Review:  The patient has hypertension .  Diet and Lifestyle: generally follows a low sodium diet, exercises sporadically  Home BP Monitoring: is not measured at home.  Pertinent ROS: taking medications as instructed, no medication side effects noted, no TIA's, no chest pain on exertion, no dyspnea on exertion, no swelling of ankles.    Dyslipidemia Review:  Patient presents for evaluation of lipids.  Compliance with treatment thus far has been excellent.  A repeat fasting lipid profile was not done.  The patient does not use medications that may worsen dyslipidemias . The patient exercises sporadically.      Diabetes Mellitus Review:  She has diabetes mellitus.  Diabetic ROS - medication compliance: compliant all of the time, diabetic diet compliance: compliant all of the time, home glucose monitoring: is performed.   Known diabetic complications: none  Cardiovascular risk factors: family history, dyslipidemia, diabetes mellitus, obesity, hypertension  Current diabetic medications include oral agents/insulin   Eye exam current (within one year): no  Weight trend: stable  Prior visit with dietician: no  Current diet: \"healthy\" diet  in general  Current exercise: walking  Current monitoring regimen: home blood tests - daily  Home blood sugar records: trend: stable  Any episodes of hypoglycemia? no  Lab review: orders written for new lab studies as appropriate; see orders.       GERD Review:   Patient has a history of gastroesophageal reflux with heartburn. Symptoms have been present for a few months.  She denies dysphagia.  She  has not lost weight.  She denies melena, hematochezia, hematemesis, and coffee ground emesis.  This has been associated with fullness after meals.  She denies abdominal bloating and none.  Medical therapy in the past has included proton

## 2025-06-18 NOTE — PROGRESS NOTES
Identified pt with two pt identifiers(name and ). Reviewed record in preparation for visit and have obtained necessary documentation. All patient medications has been reviewed.  No chief complaint on file.        Health Maintenance Due   Topic    Diabetic Alb to Cr ratio (uACR) test     Diabetic retinal exam     Hepatitis C screen     DTaP/Tdap/Td vaccine (1 - Tdap)    Pneumococcal 50+ years Vaccine (1 of 2 - PCV)    Shingles vaccine (1 of 2)    Respiratory Syncytial Virus (RSV) Pregnant or age 60 yrs+ (1 - Risk 60-74 years 1-dose series)    Colorectal Cancer Screen     Diabetic foot exam     COVID-19 Vaccine ( season)     Health Maintenance Review: Patient reminded of \"due or due soon\" health maintenance. I have asked the patient to contact his/her primary care provider (PCP) for follow-up on his/her health maintenance.        Wt Readings from Last 3 Encounters:   25 65.3 kg (144 lb)   25 70.8 kg (156 lb)   25 70.8 kg (156 lb)     Temp Readings from Last 3 Encounters:   25 98 °F (36.7 °C) (Oral)   25 99 °F (37.2 °C) (Oral)   23 98 °F (36.7 °C) (Oral)     BP Readings from Last 3 Encounters:   25 (!) 136/90   25 128/68   25 131/80     Pulse Readings from Last 3 Encounters:   25 84   25 95   25 81       Vitals:    25 1336   BP: (!) 136/90   Pulse: 84   Resp: 18   SpO2: 97%        1. \"Have you been to the ER, urgent care clinic since your last visit?  Hospitalized since your last visit?\" No    2. \"Have you seen or consulted any other health care providers outside of the [facility] since your last visit?\" No     3. For patients aged 45-75: Has the patient had a colonoscopy / FIT/ Cologuard? Yes      If the patient is female:    4. For patients aged 40-74: Has the patient had a mammogram within the past 2 years? Yes      5. For patients aged 21-65 : Has the patient had a pap smear? NA - based on age or sex

## 2025-06-19 LAB
BUN SERPL-MCNC: 20 MG/DL (ref 8–27)
BUN/CREAT SERPL: 12 (ref 12–28)
CALCIUM SERPL-MCNC: 9.9 MG/DL (ref 8.7–10.3)
CHLORIDE SERPL-SCNC: 102 MMOL/L (ref 96–106)
CO2 SERPL-SCNC: 21 MMOL/L (ref 20–29)
CREAT SERPL-MCNC: 1.65 MG/DL (ref 0.57–1)
EGFRCR SERPLBLD CKD-EPI 2021: 33 ML/MIN/1.73
GLUCOSE SERPL-MCNC: 150 MG/DL (ref 70–99)
POTASSIUM SERPL-SCNC: 4.2 MMOL/L (ref 3.5–5.2)
REPORT: NORMAL
SODIUM SERPL-SCNC: 141 MMOL/L (ref 134–144)

## 2025-07-21 DIAGNOSIS — E11.9 TYPE 2 DIABETES MELLITUS WITHOUT COMPLICATION, WITHOUT LONG-TERM CURRENT USE OF INSULIN (HCC): Primary | ICD-10-CM

## 2025-07-21 DIAGNOSIS — I10 ESSENTIAL (PRIMARY) HYPERTENSION: ICD-10-CM

## 2025-07-21 DIAGNOSIS — G51.9 DISORDER OF FACIAL NERVE, UNSPECIFIED: ICD-10-CM

## 2025-07-21 RX ORDER — HYDROCHLOROTHIAZIDE 12.5 MG/1
12.5 CAPSULE ORAL EVERY MORNING
Qty: 30 CAPSULE | Refills: 3 | Status: SHIPPED | OUTPATIENT
Start: 2025-07-21

## 2025-08-11 RX ORDER — HYDROCHLOROTHIAZIDE 12.5 MG/1
12.5 CAPSULE ORAL EVERY MORNING
Qty: 90 CAPSULE | Refills: 1 | OUTPATIENT
Start: 2025-08-11